# Patient Record
Sex: MALE | Race: WHITE | Employment: FULL TIME | URBAN - METROPOLITAN AREA
[De-identification: names, ages, dates, MRNs, and addresses within clinical notes are randomized per-mention and may not be internally consistent; named-entity substitution may affect disease eponyms.]

---

## 2019-07-10 ENCOUNTER — TELEPHONE (OUTPATIENT)
Dept: RHEUMATOLOGY | Facility: CLINIC | Age: 39
End: 2019-07-10

## 2019-07-10 NOTE — TELEPHONE ENCOUNTER
Called and offered pt appt on Friday, pt is unable to make it, will place him on waiting list to be seen.    Hannah Flores RN  Rheumatology Clinic

## 2019-07-10 NOTE — TELEPHONE ENCOUNTER
----- Message from Isabella Manley MD sent at 7/10/2019  8:38 AM CDT -----  Regarding: new ANCA vasculitis  I could see him on Fri at 3 pm, 30 min is ok.

## 2019-07-16 ENCOUNTER — TELEPHONE (OUTPATIENT)
Dept: OTOLARYNGOLOGY | Facility: CLINIC | Age: 39
End: 2019-07-16

## 2019-07-16 NOTE — TELEPHONE ENCOUNTER
LVM with patient letting him know that Dr. Singleton will not be in clinic on 8/21 but I do have him rescheduled to 8/19.  Left Call center number in case he does need to reschedule to next available.

## 2019-08-03 NOTE — TELEPHONE ENCOUNTER
FUTURE VISIT INFORMATION      FUTURE VISIT INFORMATION:    Date: 8/19/19    Time: 3PM    [place] [facility] [place]  REFERRAL INFORMATION:    Referring provider:  Dr Kyree Jordan    Referring providers clinic:  [facility]?    Reason for visit/diagnosis : persistent sinus disease and possible ANCA vasculitis not improving with methotrexate treatment    RECORDS REQUESTED FROM:       Clinic name Comments Records Status Imaging Status   [facility]   06/21/2019 COMPLETE PULMONARY FUNCTION TEST WITH BRONCHODILATOR with ROBIN Sarmiento MD Care Everywhere    [facility]   06/25/2019 notes with ROBIN Sarmiento MD Care Everywhere    [facility] images 5/9/19 CT Sinus and Ct Chest  Care Everywhere PACS

## 2019-08-07 NOTE — TELEPHONE ENCOUNTER
Called and offered an appt for tomorrow with Dr. Manley.  Pt accepted.    Hannah Flores RN  Rheumatology Clinic

## 2019-08-08 ENCOUNTER — OFFICE VISIT (OUTPATIENT)
Dept: RHEUMATOLOGY | Facility: CLINIC | Age: 39
End: 2019-08-08
Attending: INTERNAL MEDICINE
Payer: COMMERCIAL

## 2019-08-08 VITALS
BODY MASS INDEX: 29.28 KG/M2 | DIASTOLIC BLOOD PRESSURE: 81 MMHG | HEIGHT: 68 IN | RESPIRATION RATE: 95 BRPM | SYSTOLIC BLOOD PRESSURE: 128 MMHG | WEIGHT: 193.2 LBS | TEMPERATURE: 98 F | HEART RATE: 55 BPM

## 2019-08-08 DIAGNOSIS — I77.82 ANCA-ASSOCIATED VASCULITIS (H): ICD-10-CM

## 2019-08-08 DIAGNOSIS — I77.82 ANCA-ASSOCIATED VASCULITIS (H): Primary | ICD-10-CM

## 2019-08-08 LAB
MYELOPEROXIDASE AB SER-ACNC: >8 AI (ref 0–0.9)
PROTEINASE3 IGG SER-ACNC: <0.2 AI (ref 0–0.9)

## 2019-08-08 PROCEDURE — G0463 HOSPITAL OUTPT CLINIC VISIT: HCPCS | Mod: ZF

## 2019-08-08 PROCEDURE — 86255 FLUORESCENT ANTIBODY SCREEN: CPT | Performed by: INTERNAL MEDICINE

## 2019-08-08 PROCEDURE — 82657 ENZYME CELL ACTIVITY: CPT | Performed by: INTERNAL MEDICINE

## 2019-08-08 PROCEDURE — 83876 ASSAY MYELOPEROXIDASE: CPT | Performed by: INTERNAL MEDICINE

## 2019-08-08 PROCEDURE — 36415 COLL VENOUS BLD VENIPUNCTURE: CPT | Performed by: INTERNAL MEDICINE

## 2019-08-08 PROCEDURE — 83516 IMMUNOASSAY NONANTIBODY: CPT | Performed by: INTERNAL MEDICINE

## 2019-08-08 RX ORDER — METHOTREXATE 2.5 MG/1
2.5 TABLET ORAL WEEKLY
COMMUNITY
Start: 2019-06-06 | End: 2019-08-22

## 2019-08-08 RX ORDER — DEXAMETHASONE 4 MG/1
1 TABLET ORAL DAILY
COMMUNITY
Start: 2019-04-30

## 2019-08-08 RX ORDER — CLINDAMYCIN PHOSPHATE 10 MG/G
AEROSOL, FOAM TOPICAL PRN
COMMUNITY
Start: 2019-06-01 | End: 2024-05-15

## 2019-08-08 RX ORDER — MONTELUKAST SODIUM 10 MG/1
10 TABLET ORAL AT BEDTIME
COMMUNITY
Start: 2019-06-13

## 2019-08-08 RX ORDER — ALBUTEROL SULFATE 90 UG/1
AEROSOL, METERED RESPIRATORY (INHALATION) PRN
COMMUNITY
Start: 2019-05-14

## 2019-08-08 RX ORDER — FOLIC ACID 1 MG/1
3 TABLET ORAL DAILY
COMMUNITY
Start: 2019-07-05 | End: 2019-08-22

## 2019-08-08 RX ORDER — TRETINOIN 1 MG/G
CREAM TOPICAL PRN
COMMUNITY
Start: 2018-12-26 | End: 2024-05-15

## 2019-08-08 RX ORDER — BUDESONIDE 1 MG/2ML
1 INHALANT ORAL DAILY
COMMUNITY
Start: 2019-07-29 | End: 2024-05-15

## 2019-08-08 RX ORDER — CETIRIZINE HYDROCHLORIDE 10 MG/1
10 TABLET ORAL DAILY
COMMUNITY
Start: 2019-06-25 | End: 2024-05-15

## 2019-08-08 RX ORDER — BUDESONIDE AND FORMOTEROL FUMARATE DIHYDRATE 160; 4.5 UG/1; UG/1
2 AEROSOL RESPIRATORY (INHALATION) 2 TIMES DAILY
COMMUNITY
Start: 2019-07-08

## 2019-08-08 SDOH — HEALTH STABILITY: MENTAL HEALTH: HOW OFTEN DO YOU HAVE A DRINK CONTAINING ALCOHOL?: 2-3 TIMES A WEEK

## 2019-08-08 ASSESSMENT — PAIN SCALES - GENERAL: PAINLEVEL: NO PAIN (0)

## 2019-08-08 ASSESSMENT — MIFFLIN-ST. JEOR: SCORE: 1755.85

## 2019-08-08 NOTE — LETTER
Patient:  Mj Mason Jr.  :   1980  MRN:     [id number]        Mr.Eric Isabella Brown.  [address]  [facility] [address]        2019    Dear ,    We are writing to inform you of your test results. I spoke with Dr. 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 TPMT is normal and imuran could be used. As we discussed, recommend to switch methotrexate to imuran 50 mg a day with monitoring labs in 4 weeks. I will send a prescription and order labs. Please let me know if you have any questions.      Results for orders placed or performed in visit on 19   Thiopurine Methyltransferase RBC   Result Value Ref Range    Thiopurine Methyltransferase RBC 28.3 24.0 - 44.0 U/mL       Isabella Manley MD
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
## 2019-08-08 NOTE — LETTER
Patient:  Mj Mason Jr.  :   1980  MRN:     [id number]        Mr.Eric Isabella Brown.  [address]  [facility] [address]        2019    Dear ,    We are writing to inform you of your test results. I spoke with Dr. 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 TPMT is normal and imuran could be used. As we discussed, recommend to switch methotrexate to imuran 50 mg a day with monitoring labs in 4 weeks. I will send a prescription and order labs. Please let me know if you have any questions.    Positive MPO (vasculitis marker) which could be seen in EGPA.      Results for orders placed or performed in visit on 19   ANCA Vasculitis panel   Result Value Ref Range    Myeloperoxidase Antibody IgG >8.0 (H) 0.0 - 0.9 AI    Proteinase 3 Antibody IgG <0.2 0.0 - 0.9 AI   ANCA IgG by IFA with Reflex to Titer   Result Value Ref Range    Neutrophil Cytoplasmic Antibody <1:10 <1:10 [titer]    Neutrophil Cytoplasmic Antibody Pattern       The ANCA IFA is <1:10.  No further testing will be performed.       Isabella Manley MD
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
## 2019-08-08 NOTE — PATIENT INSTRUCTIONS
Agree with diagnosis: Limited p-ANCA vasculitis    Stay on methotrexate till (have it on 8/14) we get the lab results then the plan is to switch to imuran 50 mg a day    Labs today    Return in 3-4 months
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
## 2019-08-08 NOTE — NURSING NOTE
"Chief Complaint   Patient presents with     Consult     ANCA Vasculitis     /81 (BP Location: Right arm, Patient Position: Sitting, Cuff Size: Adult Regular)   Pulse 55   Temp 98  F (36.7  C) (Oral)   Resp (!) 95   Ht 1.727 m (5' 8\")   Wt 87.6 kg (193 lb 3.2 oz)   BMI 29.38 kg/m      Gabi Vega CMA    8/8/2019 8:14 AM      "

## 2019-08-09 LAB
ANCA AB PATTERN SER IF-IMP: NORMAL
C-ANCA TITR SER IF: NORMAL {TITER}

## 2019-08-13 LAB — TPMT BLD-CCNC: 28.3 U/ML (ref 24–44)

## 2019-08-19 ENCOUNTER — PRE VISIT (OUTPATIENT)
Dept: OTOLARYNGOLOGY | Facility: CLINIC | Age: 39
End: 2019-08-19

## 2019-08-19 ENCOUNTER — OFFICE VISIT (OUTPATIENT)
Dept: OTOLARYNGOLOGY | Facility: CLINIC | Age: 39
End: 2019-08-19
Payer: COMMERCIAL

## 2019-08-19 VITALS
DIASTOLIC BLOOD PRESSURE: 73 MMHG | SYSTOLIC BLOOD PRESSURE: 117 MMHG | RESPIRATION RATE: 16 BRPM | BODY MASS INDEX: 29.62 KG/M2 | HEART RATE: 62 BPM | WEIGHT: 194.8 LBS

## 2019-08-19 DIAGNOSIS — J32.4 CHRONIC PANSINUSITIS: Primary | ICD-10-CM

## 2019-08-19 ASSESSMENT — PAIN SCALES - GENERAL: PAINLEVEL: NO PAIN (0)
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
## 2019-08-19 NOTE — NURSING NOTE
Chief Complaint   Patient presents with     Consult     sinus disease possible ANCA     Maia Kiser LPN
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
## 2019-08-19 NOTE — PATIENT INSTRUCTIONS
Thank you for choosing  [facility]  Please follow up with Dr. Singleton as needed.  [phone] appointment scheduling option 1 and nurse advice option 3.

## 2019-08-22 ENCOUNTER — TELEPHONE (OUTPATIENT)
Dept: RHEUMATOLOGY | Facility: CLINIC | Age: 39
End: 2019-08-22

## 2019-08-22 DIAGNOSIS — M30.1 EOSINOPHILIC GRANULOMATOSIS WITH POLYANGIITIS (EGPA) (H): Primary | ICD-10-CM

## 2019-08-22 DIAGNOSIS — Z51.81 MEDICATION MONITORING ENCOUNTER: ICD-10-CM

## 2019-08-22 DIAGNOSIS — D72.18 EOSINOPHILIC GRANULOMATOSIS WITH POLYANGIITIS (EGPA) (H): Primary | ICD-10-CM

## 2019-08-22 RX ORDER — AZATHIOPRINE 50 MG/1
50 TABLET ORAL DAILY
Qty: 30 TABLET | Refills: 1 | Status: SHIPPED | OUTPATIENT
Start: 2019-08-22 | End: 2019-10-22

## 2019-08-22 NOTE — RESULT ENCOUNTER NOTE
I spoke with Dr. 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 TPMT is normal and imuran could be used. As we discussed, recommend to switch methotrexate to imuran 50 mg a day with monitoring labs in 4 weeks. I will send a prescription and order labs. Please let me know if you have any questions.

## 2019-08-22 NOTE — RESULT ENCOUNTER NOTE
I spoke with Dr. 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 TPMT is normal and imuran could be used. As we discussed, recommend to switch methotrexate to imuran 50 mg a day with monitoring labs in 4 weeks. I will send a prescription and order labs. Please let me know if you have any questions.  Positive MPO (vasculitis marker) which could be seen in EGPA.

## 2019-08-22 NOTE — TELEPHONE ENCOUNTER
Isabella Manley MD Beard, Hannah, RN   Caller: Unspecified (Today,  9:36 AM)             Last methotrexate dose was supposed to be 8/14 and to start imuran 50 every day today. I already messaged him with test results.      Called and spoke with pt, he took last dose of Methotrexate on 8/14.  He will start Imuran at 50 mg a day and will repeat labs in one month.  Pt had no further questions.    Hannah Flores RN  Rheumatology Clinic

## 2019-08-22 NOTE — TELEPHONE ENCOUNTER
[facility] Call Center    Phone Message    May a detailed message be left on voicemail: yes    Reason for Call: Other: Patient is wondering how to proceed with his new medication and his old medication. He is wondering if he should titrate off the methotrexate or not. He would appreciate a phone call at [phone] to clarify the above for him//Thanks.     Action Taken: Message routed to:  [facility] ([facility]): rheum

## 2019-10-04 ENCOUNTER — HEALTH MAINTENANCE LETTER (OUTPATIENT)
Age: 39
End: 2019-10-04

## 2019-10-17 DIAGNOSIS — D72.18 EOSINOPHILIC GRANULOMATOSIS WITH POLYANGIITIS (EGPA) (H): ICD-10-CM

## 2019-10-17 DIAGNOSIS — M30.1 EOSINOPHILIC GRANULOMATOSIS WITH POLYANGIITIS (EGPA) (H): ICD-10-CM

## 2019-10-18 RX ORDER — AZATHIOPRINE 50 MG/1
TABLET ORAL
Qty: 30 TABLET | Refills: 1 | OUTPATIENT
Start: 2019-10-18

## 2019-10-18 NOTE — TELEPHONE ENCOUNTER
azaTHIOprine (IMURAN) 50 MG tablet      Last Written Prescription Date:  8/22/19  Last Fill Quantity: 30,   # refills: 1  Last Office Visit: 8/8/19  Future Office visit:  12/11/19    CBC RESULTS: No results for input(s): WBC, RBC, HGB, HCT, MCV, MCH, MCHC, RDW, PLT in the last 72791 hours.    No results found for: CR]    Liver Function Studies - No results for input(s): PROTTOTAL, ALBUMIN, BILITOTAL, ALKPHOS, AST, ALT, BILIDIRECT in the last 65498 hours.    Routing refill request to provider for review/approval because:    Drug not on the [facility] [facility] or [facility] refill protocol or controlled substance    Lab appointment on 12/11/19

## 2019-10-22 DIAGNOSIS — Z51.81 MEDICATION MONITORING ENCOUNTER: Primary | ICD-10-CM

## 2019-10-22 DIAGNOSIS — M30.1 EOSINOPHILIC GRANULOMATOSIS WITH POLYANGIITIS (EGPA) (H): ICD-10-CM

## 2019-10-22 DIAGNOSIS — Z51.81 MEDICATION MONITORING ENCOUNTER: ICD-10-CM

## 2019-10-22 DIAGNOSIS — D72.18 EOSINOPHILIC GRANULOMATOSIS WITH POLYANGIITIS (EGPA) (H): ICD-10-CM

## 2019-10-22 LAB
ALBUMIN SERPL-MCNC: 4.1 G/DL (ref 3.4–5)
ALT SERPL W P-5'-P-CCNC: 62 U/L (ref 0–70)
AST SERPL W P-5'-P-CCNC: 45 U/L (ref 0–45)
BASOPHILS # BLD AUTO: 0.1 10E9/L (ref 0–0.2)
BASOPHILS NFR BLD AUTO: 0.9 %
CREAT SERPL-MCNC: 0.94 MG/DL (ref 0.66–1.25)
CRP SERPL-MCNC: <2.9 MG/L (ref 0–8)
DIFFERENTIAL METHOD BLD: ABNORMAL
EOSINOPHIL # BLD AUTO: 0.1 10E9/L (ref 0–0.7)
EOSINOPHIL NFR BLD AUTO: 1.9 %
ERYTHROCYTE [DISTWIDTH] IN BLOOD BY AUTOMATED COUNT: 12.5 % (ref 10–15)
ERYTHROCYTE [SEDIMENTATION RATE] IN BLOOD BY WESTERGREN METHOD: 6 MM/H (ref 0–15)
GFR SERPL CREATININE-BSD FRML MDRD: >90 ML/MIN/{1.73_M2}
HCT VFR BLD AUTO: 44.6 % (ref 40–53)
HGB BLD-MCNC: 15 G/DL (ref 13.3–17.7)
IMM GRANULOCYTES # BLD: 0 10E9/L (ref 0–0.4)
IMM GRANULOCYTES NFR BLD: 0.2 %
LYMPHOCYTES # BLD AUTO: 1.5 10E9/L (ref 0.8–5.3)
LYMPHOCYTES NFR BLD AUTO: 27.4 %
MCH RBC QN AUTO: 33.8 PG (ref 26.5–33)
MCHC RBC AUTO-ENTMCNC: 33.6 G/DL (ref 31.5–36.5)
MCV RBC AUTO: 101 FL (ref 78–100)
MONOCYTES # BLD AUTO: 0.5 10E9/L (ref 0–1.3)
MONOCYTES NFR BLD AUTO: 8.8 %
NEUTROPHILS # BLD AUTO: 3.2 10E9/L (ref 1.6–8.3)
NEUTROPHILS NFR BLD AUTO: 60.8 %
NRBC # BLD AUTO: 0 10*3/UL
NRBC BLD AUTO-RTO: 0 /100
PLATELET # BLD AUTO: 244 10E9/L (ref 150–450)
RBC # BLD AUTO: 4.44 10E12/L (ref 4.4–5.9)
WBC # BLD AUTO: 5.3 10E9/L (ref 4–11)

## 2019-10-22 RX ORDER — AZATHIOPRINE 50 MG/1
50 TABLET ORAL 2 TIMES DAILY WITH MEALS
Qty: 60 TABLET | Refills: 1 | Status: SHIPPED | OUTPATIENT
Start: 2019-10-22 | End: 2020-02-06

## 2019-10-22 NOTE — LETTER
Patient:  Mj Mason Jr.  :   1980  MRN:     [id number]        [address]  [address] [address]        2019    Dear ,    We are writing to inform you of your test results.     Improved MPO vasculitis marker.    No imuran toxicity which is good news, refilled imuran as 50 mg twice a day with food, please have labs repeated in 4 weeks (ordered).      Results for orders placed or performed in visit on 10/22/19   ALT   Result Value Ref Range    ALT 62 0 - 70 U/L   Albumin level   Result Value Ref Range    Albumin 4.1 3.4 - 5.0 g/dL   AST   Result Value Ref Range    AST 45 0 - 45 U/L   CBC with platelets differential   Result Value Ref Range    WBC 5.3 4.0 - 11.0 10e9/L    RBC Count 4.44 4.4 - 5.9 10e12/L    Hemoglobin 15.0 13.3 - 17.7 g/dL    Hematocrit 44.6 40.0 - 53.0 %     (H) 78 - 100 fl    MCH 33.8 (H) 26.5 - 33.0 pg    MCHC 33.6 31.5 - 36.5 g/dL    RDW 12.5 10.0 - 15.0 %    Platelet Count 244 150 - 450 10e9/L    Diff Method Automated Method     % Neutrophils 60.8 %    % Lymphocytes 27.4 %    % Monocytes 8.8 %    % Eosinophils 1.9 %    % Basophils 0.9 %    % Immature Granulocytes 0.2 %    Nucleated RBCs 0 0 /100    Absolute Neutrophil 3.2 1.6 - 8.3 10e9/L    Absolute Lymphocytes 1.5 0.8 - 5.3 10e9/L    Absolute Monocytes 0.5 0.0 - 1.3 10e9/L    Absolute Eosinophils 0.1 0.0 - 0.7 10e9/L    Absolute Basophils 0.1 0.0 - 0.2 10e9/L    Abs Immature Granulocytes 0.0 0 - 0.4 10e9/L    Absolute Nucleated RBC 0.0    Creatinine   Result Value Ref Range    Creatinine 0.94 0.66 - 1.25 mg/dL    GFR Estimate >90 >60 mL/min/[1.73_m2]    GFR Estimate If Black >90 >60 mL/min/[1.73_m2]   ANCA Vasculitis panel   Result Value Ref Range    Myeloperoxidase Antibody IgG 6.9 (H) 0.0 - 0.9 AI    Proteinase 3 Antibody IgG <0.2 0.0 - 0.9 AI   Erythrocyte sedimentation rate auto   Result Value Ref Range    Sed Rate 6 0 - 15 mm/h   CRP inflammation   Result Value Ref Range    CRP  Inflammation <2.9 0.0 - 8.0 mg/L   ANCA IgG by IFA with Reflex to Titer   Result Value Ref Range    Neutrophil Cytoplasmic Antibody <1:10 <1:10 [titer]    Neutrophil Cytoplasmic Antibody Pattern       The ANCA IFA is <1:10.  No further testing will be performed.       Isabella Manley MD

## 2019-10-22 NOTE — TELEPHONE ENCOUNTER
Called and spoke with pt.  Relayed message from .  He will be more diligent in getting labs done in 4 weeks.  Aware that they have been ordered, he will just walk into lab to get done.  Will increase medication.    Hannah Flores RN  Rheumatology Clinic

## 2019-10-22 NOTE — TELEPHONE ENCOUNTER
Called and spoke with pt, he doesn't know if it is truly helping.  He does not feel that it is really helpful at this time.  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  His ENT did give him a prednisone taper, which has not really helped, he is currently losing his sense of smell.  He would like to have surgery this year, as his deductible is met.    He did state that his ENT does think there has been some improvement overall with the amount and size of polyps in the last year.  Pt is wondering if his dose of Imuran were to increase if he would notice a difference?    Discussed needing labs, pt misunderstood and thought it was 4 months after starting Imuran, not 4 weeks.  He will come in and get labs done today, and let me know that he ran out of medication yesterday, he did not take any today.    Hannah Flores RN  Rheumatology Clinic

## 2019-10-22 NOTE — RESULT ENCOUNTER NOTE
No imuran toxicity which is good news, refilled imuran as 50 mg twice a day with food, please have labs repeated in 4 weeks (ordered).

## 2019-10-22 NOTE — TELEPHONE ENCOUNTER
Recommend to increase AZA to 50 mg bid if today's labs look good with repeat monitoring labs in 4 weeks.

## 2019-10-23 LAB
ANCA AB PATTERN SER IF-IMP: NORMAL
C-ANCA TITR SER IF: NORMAL {TITER}

## 2019-10-24 LAB
MYELOPEROXIDASE AB SER-ACNC: 6.9 AI (ref 0–0.9)
PROTEINASE3 IGG SER-ACNC: <0.2 AI (ref 0–0.9)

## 2019-11-18 ENCOUNTER — TELEPHONE (OUTPATIENT)
Dept: RHEUMATOLOGY | Facility: CLINIC | Age: 39
End: 2019-11-18

## 2019-11-18 ENCOUNTER — TRANSFERRED RECORDS (OUTPATIENT)
Dept: HEALTH INFORMATION MANAGEMENT | Facility: CLINIC | Age: 39
End: 2019-11-18

## 2019-11-18 NOTE — TELEPHONE ENCOUNTER
Called and spoke with pt regarding Sinus Surgery on 12/10, he just got scheduled so he would like to rebook his appt for the 11th.  Appt is still open so have rebooked pt.      Pt currently does have a sinus infection.  He will hold Imuran until antibiotics are completed.  Discussed needing to hold Imuran for 3 prior to and post surgery on 12/10, he also is aware that there needs to be no signs or symptoms of infection and he should also discuss with his surgeon.      Hannah Flores RN  Rheumatology Clinic

## 2019-11-21 DIAGNOSIS — Z51.81 MEDICATION MONITORING ENCOUNTER: ICD-10-CM

## 2019-11-21 DIAGNOSIS — M30.1 EOSINOPHILIC GRANULOMATOSIS WITH POLYANGIITIS (EGPA) (H): ICD-10-CM

## 2019-11-21 DIAGNOSIS — D72.18 EOSINOPHILIC GRANULOMATOSIS WITH POLYANGIITIS (EGPA) (H): ICD-10-CM

## 2019-11-21 LAB
ALBUMIN SERPL-MCNC: 4.5 G/DL (ref 3.4–5)
ALT SERPL W P-5'-P-CCNC: 48 U/L (ref 0–70)
AST SERPL W P-5'-P-CCNC: 32 U/L (ref 0–45)
BASOPHILS # BLD AUTO: 0.1 10E9/L (ref 0–0.2)
BASOPHILS NFR BLD AUTO: 1.2 %
CREAT SERPL-MCNC: 0.94 MG/DL (ref 0.66–1.25)
DIFFERENTIAL METHOD BLD: ABNORMAL
EOSINOPHIL # BLD AUTO: 0.2 10E9/L (ref 0–0.7)
EOSINOPHIL NFR BLD AUTO: 3.8 %
ERYTHROCYTE [DISTWIDTH] IN BLOOD BY AUTOMATED COUNT: 12.2 % (ref 10–15)
GFR SERPL CREATININE-BSD FRML MDRD: >90 ML/MIN/{1.73_M2}
HCT VFR BLD AUTO: 44.8 % (ref 40–53)
HGB BLD-MCNC: 15.2 G/DL (ref 13.3–17.7)
IMM GRANULOCYTES # BLD: 0 10E9/L (ref 0–0.4)
IMM GRANULOCYTES NFR BLD: 0.4 %
LYMPHOCYTES # BLD AUTO: 1.7 10E9/L (ref 0.8–5.3)
LYMPHOCYTES NFR BLD AUTO: 34.2 %
MCH RBC QN AUTO: 33.6 PG (ref 26.5–33)
MCHC RBC AUTO-ENTMCNC: 33.9 G/DL (ref 31.5–36.5)
MCV RBC AUTO: 99 FL (ref 78–100)
MONOCYTES # BLD AUTO: 0.5 10E9/L (ref 0–1.3)
MONOCYTES NFR BLD AUTO: 9.5 %
NEUTROPHILS # BLD AUTO: 2.5 10E9/L (ref 1.6–8.3)
NEUTROPHILS NFR BLD AUTO: 50.9 %
NRBC # BLD AUTO: 0 10*3/UL
NRBC BLD AUTO-RTO: 0 /100
PLATELET # BLD AUTO: 260 10E9/L (ref 150–450)
RBC # BLD AUTO: 4.52 10E12/L (ref 4.4–5.9)
WBC # BLD AUTO: 5 10E9/L (ref 4–11)

## 2019-11-21 NOTE — LETTER
Patient:  Mj Mason Jr.  :   1980  MRN:     [id number]        Mr.Eric Isabella Art   [address] [facility] [address]        2019    Dear ,    We are writing to inform you of your test results. Stable labs, no imuran toxicity.      Results for orders placed or performed in visit on 19   Creatinine     Status: None   Result Value Ref Range    Creatinine 0.94 0.66 - 1.25 mg/dL    GFR Estimate >90 >60 mL/min/[1.73_m2]    GFR Estimate If Black >90 >60 mL/min/[1.73_m2]   CBC with platelets differential     Status: Abnormal   Result Value Ref Range    WBC 5.0 4.0 - 11.0 10e9/L    RBC Count 4.52 4.4 - 5.9 10e12/L    Hemoglobin 15.2 13.3 - 17.7 g/dL    Hematocrit 44.8 40.0 - 53.0 %    MCV 99 78 - 100 fl    MCH 33.6 (H) 26.5 - 33.0 pg    MCHC 33.9 31.5 - 36.5 g/dL    RDW 12.2 10.0 - 15.0 %    Platelet Count 260 150 - 450 10e9/L    Diff Method Automated Method     % Neutrophils 50.9 %    % Lymphocytes 34.2 %    % Monocytes 9.5 %    % Eosinophils 3.8 %    % Basophils 1.2 %    % Immature Granulocytes 0.4 %    Nucleated RBCs 0 0 /100    Absolute Neutrophil 2.5 1.6 - 8.3 10e9/L    Absolute Lymphocytes 1.7 0.8 - 5.3 10e9/L    Absolute Monocytes 0.5 0.0 - 1.3 10e9/L    Absolute Eosinophils 0.2 0.0 - 0.7 10e9/L    Absolute Basophils 0.1 0.0 - 0.2 10e9/L    Abs Immature Granulocytes 0.0 0 - 0.4 10e9/L    Absolute Nucleated RBC 0.0    AST     Status: None   Result Value Ref Range    AST 32 0 - 45 U/L   Albumin level     Status: None   Result Value Ref Range    Albumin 4.5 3.4 - 5.0 g/dL   ALT     Status: None   Result Value Ref Range    ALT 48 0 - 70 U/L       Isabella Manley MD

## 2020-02-06 ENCOUNTER — OFFICE VISIT (OUTPATIENT)
Dept: RHEUMATOLOGY | Facility: CLINIC | Age: 40
End: 2020-02-06
Attending: INTERNAL MEDICINE
Payer: COMMERCIAL

## 2020-02-06 VITALS
BODY MASS INDEX: 29.86 KG/M2 | OXYGEN SATURATION: 98 % | WEIGHT: 196.4 LBS | HEART RATE: 66 BPM | SYSTOLIC BLOOD PRESSURE: 134 MMHG | DIASTOLIC BLOOD PRESSURE: 80 MMHG

## 2020-02-06 DIAGNOSIS — D72.18 EOSINOPHILIC GRANULOMATOSIS WITH POLYANGIITIS (EGPA) (H): ICD-10-CM

## 2020-02-06 DIAGNOSIS — M30.1 EOSINOPHILIC GRANULOMATOSIS WITH POLYANGIITIS (EGPA) (H): ICD-10-CM

## 2020-02-06 LAB
ALBUMIN SERPL-MCNC: 3.8 G/DL (ref 3.4–5)
ALBUMIN UR-MCNC: NEGATIVE MG/DL
ALT SERPL W P-5'-P-CCNC: 92 U/L (ref 0–70)
APPEARANCE UR: CLEAR
AST SERPL W P-5'-P-CCNC: 47 U/L (ref 0–45)
BASOPHILS # BLD AUTO: 0 10E9/L (ref 0–0.2)
BASOPHILS NFR BLD AUTO: 0.8 %
BILIRUB UR QL STRIP: NEGATIVE
COLOR UR AUTO: YELLOW
CREAT SERPL-MCNC: 0.96 MG/DL (ref 0.66–1.25)
CREAT UR-MCNC: 45 MG/DL
CRP SERPL-MCNC: <2.9 MG/L (ref 0–8)
DIFFERENTIAL METHOD BLD: ABNORMAL
EOSINOPHIL # BLD AUTO: 0.2 10E9/L (ref 0–0.7)
EOSINOPHIL NFR BLD AUTO: 4.3 %
ERYTHROCYTE [DISTWIDTH] IN BLOOD BY AUTOMATED COUNT: 12.9 % (ref 10–15)
ERYTHROCYTE [SEDIMENTATION RATE] IN BLOOD BY WESTERGREN METHOD: 5 MM/H (ref 0–15)
GFR SERPL CREATININE-BSD FRML MDRD: >90 ML/MIN/{1.73_M2}
GLUCOSE UR STRIP-MCNC: NEGATIVE MG/DL
HBV CORE AB SERPL QL IA: NONREACTIVE
HBV SURFACE AG SERPL QL IA: NONREACTIVE
HCT VFR BLD AUTO: 43.8 % (ref 40–53)
HCV AB SERPL QL IA: NONREACTIVE
HGB BLD-MCNC: 14.7 G/DL (ref 13.3–17.7)
HGB UR QL STRIP: NEGATIVE
IMM GRANULOCYTES # BLD: 0 10E9/L (ref 0–0.4)
IMM GRANULOCYTES NFR BLD: 0.6 %
KETONES UR STRIP-MCNC: NEGATIVE MG/DL
LEUKOCYTE ESTERASE UR QL STRIP: NEGATIVE
LYMPHOCYTES # BLD AUTO: 1.5 10E9/L (ref 0.8–5.3)
LYMPHOCYTES NFR BLD AUTO: 28.6 %
MCH RBC QN AUTO: 33.9 PG (ref 26.5–33)
MCHC RBC AUTO-ENTMCNC: 33.6 G/DL (ref 31.5–36.5)
MCV RBC AUTO: 101 FL (ref 78–100)
MONOCYTES # BLD AUTO: 0.5 10E9/L (ref 0–1.3)
MONOCYTES NFR BLD AUTO: 10.4 %
MUCOUS THREADS #/AREA URNS LPF: PRESENT /LPF
MYELOPEROXIDASE AB SER-ACNC: 6.3 AI (ref 0–0.9)
NEUTROPHILS # BLD AUTO: 2.9 10E9/L (ref 1.6–8.3)
NEUTROPHILS NFR BLD AUTO: 55.3 %
NITRATE UR QL: NEGATIVE
NRBC # BLD AUTO: 0 10*3/UL
NRBC BLD AUTO-RTO: 0 /100
PH UR STRIP: 6 PH (ref 5–7)
PLATELET # BLD AUTO: 208 10E9/L (ref 150–450)
PROT UR-MCNC: <0.05 G/L
PROT/CREAT 24H UR: NORMAL G/G CR (ref 0–0.2)
PROTEINASE3 IGG SER-ACNC: <0.2 AI (ref 0–0.9)
RBC # BLD AUTO: 4.34 10E12/L (ref 4.4–5.9)
RBC #/AREA URNS AUTO: 1 /HPF (ref 0–2)
SOURCE: ABNORMAL
SP GR UR STRIP: 1.01 (ref 1–1.03)
UROBILINOGEN UR STRIP-MCNC: 0 MG/DL (ref 0–2)
WBC # BLD AUTO: 5.2 10E9/L (ref 4–11)
WBC #/AREA URNS AUTO: 1 /HPF (ref 0–5)

## 2020-02-06 PROCEDURE — G0463 HOSPITAL OUTPT CLINIC VISIT: HCPCS | Mod: ZF

## 2020-02-06 PROCEDURE — 84156 ASSAY OF PROTEIN URINE: CPT | Performed by: INTERNAL MEDICINE

## 2020-02-06 PROCEDURE — 82040 ASSAY OF SERUM ALBUMIN: CPT | Performed by: INTERNAL MEDICINE

## 2020-02-06 PROCEDURE — 81001 URINALYSIS AUTO W/SCOPE: CPT | Performed by: INTERNAL MEDICINE

## 2020-02-06 PROCEDURE — 83876 ASSAY MYELOPEROXIDASE: CPT | Performed by: INTERNAL MEDICINE

## 2020-02-06 PROCEDURE — 84460 ALANINE AMINO (ALT) (SGPT): CPT | Performed by: INTERNAL MEDICINE

## 2020-02-06 PROCEDURE — 85025 COMPLETE CBC W/AUTO DIFF WBC: CPT | Performed by: INTERNAL MEDICINE

## 2020-02-06 PROCEDURE — 85652 RBC SED RATE AUTOMATED: CPT | Performed by: INTERNAL MEDICINE

## 2020-02-06 PROCEDURE — 86481 TB AG RESPONSE T-CELL SUSP: CPT | Performed by: INTERNAL MEDICINE

## 2020-02-06 PROCEDURE — 86803 HEPATITIS C AB TEST: CPT | Performed by: INTERNAL MEDICINE

## 2020-02-06 PROCEDURE — 86140 C-REACTIVE PROTEIN: CPT | Performed by: INTERNAL MEDICINE

## 2020-02-06 PROCEDURE — 87340 HEPATITIS B SURFACE AG IA: CPT | Performed by: INTERNAL MEDICINE

## 2020-02-06 PROCEDURE — 83516 IMMUNOASSAY NONANTIBODY: CPT | Performed by: INTERNAL MEDICINE

## 2020-02-06 PROCEDURE — 82565 ASSAY OF CREATININE: CPT | Performed by: INTERNAL MEDICINE

## 2020-02-06 PROCEDURE — 86704 HEP B CORE ANTIBODY TOTAL: CPT | Performed by: INTERNAL MEDICINE

## 2020-02-06 PROCEDURE — 86255 FLUORESCENT ANTIBODY SCREEN: CPT | Performed by: INTERNAL MEDICINE

## 2020-02-06 PROCEDURE — 36415 COLL VENOUS BLD VENIPUNCTURE: CPT | Performed by: INTERNAL MEDICINE

## 2020-02-06 PROCEDURE — 84450 TRANSFERASE (AST) (SGOT): CPT | Performed by: INTERNAL MEDICINE

## 2020-02-06 RX ORDER — AZATHIOPRINE 50 MG/1
50 TABLET ORAL 2 TIMES DAILY WITH MEALS
Qty: 60 TABLET | Refills: 1 | Status: SHIPPED | OUTPATIENT
Start: 2020-02-06 | End: 2020-04-30

## 2020-02-06 ASSESSMENT — PAIN SCALES - GENERAL: PAINLEVEL: NO PAIN (0)

## 2020-02-06 NOTE — NURSING NOTE
Chief Complaint   Patient presents with     RECHECK     ANCA Vasculitis     Blood pressure 134/80, pulse 66, weight 89.1 kg (196 lb 6.4 oz), SpO2 98 %.    Pete Dinero/VARSHA  February 6, 2020 10:07 AM

## 2020-02-06 NOTE — PATIENT INSTRUCTIONS
Resume azathioprine (imuran) at 50 mg twice a day    Labs today and again in a month    Consider Nucala if imuran does not work    Keep 4/2020 appointment and again in July 2020

## 2020-02-06 NOTE — LETTER
Patient:  Mj Mason Jr.  :   1980  MRN:     [id number]        Mr.Eric Isabella Art   [address]  [facility] [address]        2020    Dear ,    We are writing to inform you of your test results. Improved MPO vasculitis marker but liver test ALT is up (could be due to antibiotic as you were not on imuran when you had labs done), recommend repeat AST/ALT this week. Also recommend to avoid alcohol, NSIADs like ibuprofen.      Results for orders placed or performed in visit on 20   Hepatitis C antibody     Status: None   Result Value Ref Range    Hepatitis C Antibody Nonreactive NR^Nonreactive   Hepatitis B surface antigen     Status: None   Result Value Ref Range    Hep B Surface Agn Nonreactive NR^Nonreactive   Hepatitis B core antibody     Status: None   Result Value Ref Range    Hepatitis B Core Zenaida Nonreactive NR^Nonreactive   ANCA IgG by IFA with Reflex to Titer     Status: None   Result Value Ref Range    Neutrophil Cytoplasmic Antibody <1:10 <1:10 [titer]    Neutrophil Cytoplasmic Antibody Pattern       The ANCA IFA is <1:10.  No further testing will be performed.   Erythrocyte sedimentation rate auto     Status: None   Result Value Ref Range    Sed Rate 5 0 - 15 mm/h   Protein  random urine with Creat Ratio     Status: None   Result Value Ref Range    Protein Random Urine <0.05 g/L    Protein Total Urine g/gr Creatinine Unable to calculate due to low value 0 - 0.2 g/g Cr   UA with Microscopic reflex to Culture     Status: Abnormal   Result Value Ref Range    Color Urine Yellow     Appearance Urine Clear     Glucose Urine Negative NEG^Negative mg/dL    Bilirubin Urine Negative NEG^Negative    Ketones Urine Negative NEG^Negative mg/dL    Specific Gravity Urine 1.011 1.003 - 1.035    Blood Urine Negative NEG^Negative    pH Urine 6.0 5.0 - 7.0 pH    Protein Albumin Urine Negative NEG^Negative mg/dL    Urobilinogen mg/dL 0.0 0.0 - 2.0 mg/dL    Nitrite Urine Negative  NEG^Negative    Leukocyte Esterase Urine Negative NEG^Negative    Source Midstream Urine     WBC Urine 1 0 - 5 /HPF    RBC Urine 1 0 - 2 /HPF    Mucous Urine Present (A) NEG^Negative /LPF   CRP inflammation     Status: None   Result Value Ref Range    CRP Inflammation <2.9 0.0 - 8.0 mg/L   Creatinine     Status: None   Result Value Ref Range    Creatinine 0.96 0.66 - 1.25 mg/dL    GFR Estimate >90 >60 mL/min/[1.73_m2]    GFR Estimate If Black >90 >60 mL/min/[1.73_m2]   Albumin level     Status: None   Result Value Ref Range    Albumin 3.8 3.4 - 5.0 g/dL   CBC with platelets differential     Status: Abnormal   Result Value Ref Range    WBC 5.2 4.0 - 11.0 10e9/L    RBC Count 4.34 (L) 4.4 - 5.9 10e12/L    Hemoglobin 14.7 13.3 - 17.7 g/dL    Hematocrit 43.8 40.0 - 53.0 %     (H) 78 - 100 fl    MCH 33.9 (H) 26.5 - 33.0 pg    MCHC 33.6 31.5 - 36.5 g/dL    RDW 12.9 10.0 - 15.0 %    Platelet Count 208 150 - 450 10e9/L    Diff Method Automated Method     % Neutrophils 55.3 %    % Lymphocytes 28.6 %    % Monocytes 10.4 %    % Eosinophils 4.3 %    % Basophils 0.8 %    % Immature Granulocytes 0.6 %    Nucleated RBCs 0 0 /100    Absolute Neutrophil 2.9 1.6 - 8.3 10e9/L    Absolute Lymphocytes 1.5 0.8 - 5.3 10e9/L    Absolute Monocytes 0.5 0.0 - 1.3 10e9/L    Absolute Eosinophils 0.2 0.0 - 0.7 10e9/L    Absolute Basophils 0.0 0.0 - 0.2 10e9/L    Abs Immature Granulocytes 0.0 0 - 0.4 10e9/L    Absolute Nucleated RBC 0.0    Vasculitis panel     Status: Abnormal   Result Value Ref Range    Myeloperoxidase Antibody IgG 6.3 (H) 0.0 - 0.9 AI    Proteinase 3 Antibody IgG <0.2 0.0 - 0.9 AI   ALT     Status: Abnormal   Result Value Ref Range    ALT 92 (H) 0 - 70 U/L   AST     Status: Abnormal   Result Value Ref Range    AST 47 (H) 0 - 45 U/L   Creatinine urine calculation only     Status: None   Result Value Ref Range    Creatinine Urine 45 mg/dL   Quantiferon TB Gold Plus     Status: None   Result Value Ref Range    Quantiferon-TB  Gold Plus Result Negative NEG^Negative    TB1 Ag minus Nil Value 0.00 IU/mL    TB2 Ag minus Nil Value 0.00 IU/mL    Mitogen minus Nil Result >10.00 IU/mL    Nil Result 0.04 IU/mL       Isabella Manley MD
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
## 2020-02-07 LAB
ANCA AB PATTERN SER IF-IMP: NORMAL
C-ANCA TITR SER IF: NORMAL {TITER}
GAMMA INTERFERON BACKGROUND BLD IA-ACNC: 0.04 IU/ML
M TB IFN-G BLD-IMP: NEGATIVE
M TB IFN-G CD4+ BCKGRND COR BLD-ACNC: >10 IU/ML
MITOGEN IGNF BCKGRD COR BLD-ACNC: 0 IU/ML
MITOGEN IGNF BCKGRD COR BLD-ACNC: 0 IU/ML

## 2020-02-12 NOTE — RESULT ENCOUNTER NOTE
Improved MPO vasculitis marker but liver test ALT is up (could be due to antibiotic as you were not on imuran when you had labs done), recommend repeat AST/ALT this week. Also recommend to avoid alcohol, NSIADs like ibuprofen.

## 2020-03-24 DIAGNOSIS — M30.1 EOSINOPHILIC GRANULOMATOSIS WITH POLYANGIITIS (EGPA) (H): Primary | ICD-10-CM

## 2020-03-24 DIAGNOSIS — D72.18 EOSINOPHILIC GRANULOMATOSIS WITH POLYANGIITIS (EGPA) (H): Primary | ICD-10-CM

## 2020-03-25 DIAGNOSIS — M30.1 EOSINOPHILIC GRANULOMATOSIS WITH POLYANGIITIS (EGPA) (H): ICD-10-CM

## 2020-03-25 DIAGNOSIS — D72.18 EOSINOPHILIC GRANULOMATOSIS WITH POLYANGIITIS (EGPA) (H): ICD-10-CM

## 2020-03-25 LAB
ALBUMIN SERPL-MCNC: 4.4 G/DL (ref 3.4–5)
ALBUMIN UR-MCNC: NEGATIVE MG/DL
ALP SERPL-CCNC: 52 U/L (ref 40–150)
ALT SERPL W P-5'-P-CCNC: 45 U/L (ref 0–70)
ANION GAP SERPL CALCULATED.3IONS-SCNC: 2 MMOL/L (ref 3–14)
APPEARANCE UR: CLEAR
AST SERPL W P-5'-P-CCNC: 28 U/L (ref 0–45)
BASOPHILS # BLD AUTO: 0.1 10E9/L (ref 0–0.2)
BASOPHILS NFR BLD AUTO: 1.6 %
BILIRUB SERPL-MCNC: 0.6 MG/DL (ref 0.2–1.3)
BILIRUB UR QL STRIP: NEGATIVE
BUN SERPL-MCNC: 22 MG/DL (ref 7–30)
CALCIUM SERPL-MCNC: 9.1 MG/DL (ref 8.5–10.1)
CHLORIDE SERPL-SCNC: 106 MMOL/L (ref 94–109)
CO2 SERPL-SCNC: 32 MMOL/L (ref 20–32)
COLOR UR AUTO: YELLOW
CREAT SERPL-MCNC: 0.98 MG/DL (ref 0.66–1.25)
CREAT UR-MCNC: 24 MG/DL
CRP SERPL-MCNC: <2.9 MG/L (ref 0–8)
DIFFERENTIAL METHOD BLD: ABNORMAL
EOSINOPHIL # BLD AUTO: 0.2 10E9/L (ref 0–0.7)
EOSINOPHIL NFR BLD AUTO: 4.8 %
ERYTHROCYTE [DISTWIDTH] IN BLOOD BY AUTOMATED COUNT: 13 % (ref 10–15)
ERYTHROCYTE [SEDIMENTATION RATE] IN BLOOD BY WESTERGREN METHOD: 5 MM/H (ref 0–15)
GFR SERPL CREATININE-BSD FRML MDRD: >90 ML/MIN/{1.73_M2}
GLUCOSE SERPL-MCNC: 53 MG/DL (ref 70–99)
GLUCOSE UR STRIP-MCNC: NEGATIVE MG/DL
HCT VFR BLD AUTO: 45.3 % (ref 40–53)
HGB BLD-MCNC: 15.1 G/DL (ref 13.3–17.7)
HGB UR QL STRIP: NEGATIVE
IMM GRANULOCYTES # BLD: 0 10E9/L (ref 0–0.4)
IMM GRANULOCYTES NFR BLD: 0.3 %
KETONES UR STRIP-MCNC: NEGATIVE MG/DL
LEUKOCYTE ESTERASE UR QL STRIP: NEGATIVE
LYMPHOCYTES # BLD AUTO: 1.5 10E9/L (ref 0.8–5.3)
LYMPHOCYTES NFR BLD AUTO: 41 %
MCH RBC QN AUTO: 33.3 PG (ref 26.5–33)
MCHC RBC AUTO-ENTMCNC: 33.3 G/DL (ref 31.5–36.5)
MCV RBC AUTO: 100 FL (ref 78–100)
MONOCYTES # BLD AUTO: 0.3 10E9/L (ref 0–1.3)
MONOCYTES NFR BLD AUTO: 9 %
MYELOPEROXIDASE AB SER-ACNC: 4.7 AI (ref 0–0.9)
NEUTROPHILS # BLD AUTO: 1.6 10E9/L (ref 1.6–8.3)
NEUTROPHILS NFR BLD AUTO: 43.3 %
NITRATE UR QL: NEGATIVE
NRBC # BLD AUTO: 0 10*3/UL
NRBC BLD AUTO-RTO: 0 /100
PH UR STRIP: 7 PH (ref 5–7)
PLATELET # BLD AUTO: 235 10E9/L (ref 150–450)
POTASSIUM SERPL-SCNC: 3.6 MMOL/L (ref 3.4–5.3)
PROT SERPL-MCNC: 7.5 G/DL (ref 6.8–8.8)
PROT UR-MCNC: <0.05 G/L
PROT/CREAT 24H UR: NORMAL G/G CR (ref 0–0.2)
PROTEINASE3 IGG SER-ACNC: <0.2 AI (ref 0–0.9)
RBC # BLD AUTO: 4.53 10E12/L (ref 4.4–5.9)
RBC #/AREA URNS AUTO: 0 /HPF (ref 0–2)
SODIUM SERPL-SCNC: 140 MMOL/L (ref 133–144)
SOURCE: NORMAL
SP GR UR STRIP: 1.01 (ref 1–1.03)
SQUAMOUS #/AREA URNS AUTO: <1 /HPF (ref 0–1)
UROBILINOGEN UR STRIP-MCNC: 0 MG/DL (ref 0–2)
WBC # BLD AUTO: 3.8 10E9/L (ref 4–11)
WBC #/AREA URNS AUTO: 0 /HPF (ref 0–5)

## 2020-03-25 NOTE — LETTER
April 4, 2020      Mj Mason Jr.  [address]  [place] [address]        Dear ,    We are writing to inform you of your test results.    Improved liver tests and MPO (vasculitis marker). If resuming imuran does not help, will try mepolizumab (FDA approved for EGPA vasculitis which causes nasal polyps)    Resulted Orders   IgE   Result Value Ref Range    IGE 22 0 - 114 KIU/L   CRP inflammation   Result Value Ref Range    CRP Inflammation <2.9 0.0 - 8.0 mg/L   ESR   Result Value Ref Range    Sed Rate 5 0 - 15 mm/h   Comprehensive metabolic panel   Result Value Ref Range    Sodium 140 133 - 144 mmol/L    Potassium 3.6 3.4 - 5.3 mmol/L    Chloride 106 94 - 109 mmol/L    Carbon Dioxide 32 20 - 32 mmol/L    Anion Gap 2 (L) 3 - 14 mmol/L    Glucose 53 (L) 70 - 99 mg/dL    Urea Nitrogen 22 7 - 30 mg/dL    Creatinine 0.98 0.66 - 1.25 mg/dL    GFR Estimate >90 >60 mL/min/[1.73_m2]      Comment:      Non  GFR Calc  Starting 12/18/2018, serum creatinine based estimated GFR (eGFR) will be   calculated using the Chronic Kidney Disease Epidemiology Collaboration   (CKD-EPI) equation.      GFR Estimate If Black >90 >60 mL/min/[1.73_m2]      Comment:       GFR Calc  Starting 12/18/2018, serum creatinine based estimated GFR (eGFR) will be   calculated using the Chronic Kidney Disease Epidemiology Collaboration   (CKD-EPI) equation.      Calcium 9.1 8.5 - 10.1 mg/dL    Bilirubin Total 0.6 0.2 - 1.3 mg/dL    Albumin 4.4 3.4 - 5.0 g/dL    Protein Total 7.5 6.8 - 8.8 g/dL    Alkaline Phosphatase 52 40 - 150 U/L    ALT 45 0 - 70 U/L    AST 28 0 - 45 U/L   CBC with platelets differential   Result Value Ref Range    WBC 3.8 (L) 4.0 - 11.0 10e9/L    RBC Count 4.53 4.4 - 5.9 10e12/L    Hemoglobin 15.1 13.3 - 17.7 g/dL    Hematocrit 45.3 40.0 - 53.0 %     78 - 100 fl    MCH 33.3 (H) 26.5 - 33.0 pg    MCHC 33.3 31.5 - 36.5 g/dL    RDW 13.0 10.0 - 15.0 %    Platelet Count 235 150 - 450  10e9/L    Diff Method Automated Method     % Neutrophils 43.3 %    % Lymphocytes 41.0 %    % Monocytes 9.0 %    % Eosinophils 4.8 %    % Basophils 1.6 %    % Immature Granulocytes 0.3 %    Nucleated RBCs 0 0 /100    Absolute Neutrophil 1.6 1.6 - 8.3 10e9/L    Absolute Lymphocytes 1.5 0.8 - 5.3 10e9/L    Absolute Monocytes 0.3 0.0 - 1.3 10e9/L    Absolute Eosinophils 0.2 0.0 - 0.7 10e9/L    Absolute Basophils 0.1 0.0 - 0.2 10e9/L    Abs Immature Granulocytes 0.0 0 - 0.4 10e9/L    Absolute Nucleated RBC 0.0    ANCA IgG by IFA with Reflex to Titer   Result Value Ref Range    Neutrophil Cytoplasmic Antibody <1:10 <1:10 [titer]    Neutrophil Cytoplasmic Antibody Pattern       The ANCA IFA is <1:10.  No further testing will be performed.   Protein  random urine with Creat Ratio   Result Value Ref Range    Protein Random Urine <0.05 g/L    Protein Total Urine g/gr Creatinine Unable to calculate due to low value 0 - 0.2 g/g Cr   UA with Microscopic reflex to Culture   Result Value Ref Range    Color Urine Yellow     Appearance Urine Clear     Glucose Urine Negative NEG^Negative mg/dL    Bilirubin Urine Negative NEG^Negative    Ketones Urine Negative NEG^Negative mg/dL    Specific Gravity Urine 1.006 1.003 - 1.035    Blood Urine Negative NEG^Negative    pH Urine 7.0 5.0 - 7.0 pH    Protein Albumin Urine Negative NEG^Negative mg/dL    Urobilinogen mg/dL 0.0 0.0 - 2.0 mg/dL    Nitrite Urine Negative NEG^Negative    Leukocyte Esterase Urine Negative NEG^Negative    Source Midstream Urine     WBC Urine 0 0 - 5 /HPF    RBC Urine 0 0 - 2 /HPF    Squamous Epithelial /HPF Urine <1 0 - 1 /HPF   Vasculitis panel   Result Value Ref Range    Myeloperoxidase Antibody IgG 4.7 (H) 0.0 - 0.9 AI      Comment:      Positive  Antibody index (AI) values reflect qualitative changes in antibody   concentration that cannot be directly associated with clinical condition or   disease state.      Proteinase 3 Antibody IgG <0.2 0.0 - 0.9 AI       Comment:      Negative  Antibody index (AI) values reflect qualitative changes in antibody   concentration that cannot be directly associated with clinical condition or   disease state.     Creatinine urine calculation only   Result Value Ref Range    Creatinine Urine 24 mg/dL     Isabella Manley MD

## 2020-03-26 LAB
ANCA AB PATTERN SER IF-IMP: NORMAL
C-ANCA TITR SER IF: NORMAL {TITER}
IGE SERPL-ACNC: 22 KIU/L (ref 0–114)

## 2020-04-30 ENCOUNTER — VIRTUAL VISIT (OUTPATIENT)
Dept: RHEUMATOLOGY | Facility: CLINIC | Age: 40
End: 2020-04-30
Attending: INTERNAL MEDICINE
Payer: COMMERCIAL

## 2020-04-30 DIAGNOSIS — M30.1 EOSINOPHILIC GRANULOMATOSIS WITH POLYANGIITIS (EGPA) (H): ICD-10-CM

## 2020-04-30 DIAGNOSIS — Z51.81 MEDICATION MONITORING ENCOUNTER: ICD-10-CM

## 2020-04-30 DIAGNOSIS — D72.18 EOSINOPHILIC GRANULOMATOSIS WITH POLYANGIITIS (EGPA) (H): ICD-10-CM

## 2020-04-30 DIAGNOSIS — M19.019 SHOULDER ARTHRITIS: Primary | ICD-10-CM

## 2020-04-30 DIAGNOSIS — I77.82 ANCA-ASSOCIATED VASCULITIS (H): ICD-10-CM

## 2020-04-30 RX ORDER — AZATHIOPRINE 50 MG/1
50 TABLET ORAL 2 TIMES DAILY WITH MEALS
Qty: 60 TABLET | Refills: 2 | Status: SHIPPED | OUTPATIENT
Start: 2020-04-30 | End: 2020-08-06

## 2020-04-30 ASSESSMENT — PAIN SCALES - GENERAL: PAINLEVEL: NO PAIN (0)

## 2020-04-30 NOTE — PROGRESS NOTES
Called patient to set up virtual visit, no answer so I left a voicemail.  I will try again before appointment if I do not hear back from him.  Tammy Peoples CMA on 4/30/2020 at 9:15 AM

## 2020-04-30 NOTE — PATIENT INSTRUCTIONS
Labs on 5/7 to be done locally (will fax over the orders)    Stay on imuran 50 mg twice a day    Try the voltaren gel for right shoulder pain    Return 3-4 months

## 2020-04-30 NOTE — PROGRESS NOTES
"Mj Mason Jr. is a 39 year old male who is being evaluated via a billable video visit.      The patient has been notified of following:     \"This video visit will be conducted via a call between you and your physician/provider. We have found that certain health care needs can be provided without the need for an in-person physical exam.  This service lets us provide the care you need with a video conversation.  If a prescription is necessary we can send it directly to your pharmacy.  If lab work is needed we can place an order for that and you can then stop by our lab to have the test done at a later time.    Video visits are billed at different rates depending on your insurance coverage.  Please reach out to your insurance provider with any questions.    If during the course of the call the physician/provider feels a video visit is not appropriate, you will not be charged for this service.\"    Patient has given verbal consent for Video visit? Yes    How would you like to obtain your AVS? [place]    Patient would like the video invitation sent by: Text to cell phone: [phone]    Will anyone else be joining your video visit? No        Video-Visit Details    Type of service:  Video Visit    Video Start Time: 10:40 AM  Video End Time: 11 AM    Originating Location (pt. Location): Home    Distant Location (provider location):  [facility] RHEUMATOLOGY     Platform used for Video Visit: Valerie Manley MD          "

## 2020-08-02 DIAGNOSIS — M30.1 EOSINOPHILIC GRANULOMATOSIS WITH POLYANGIITIS (EGPA) (H): ICD-10-CM

## 2020-08-02 DIAGNOSIS — D72.18 EOSINOPHILIC GRANULOMATOSIS WITH POLYANGIITIS (EGPA) (H): ICD-10-CM

## 2020-08-06 RX ORDER — AZATHIOPRINE 50 MG/1
50 TABLET ORAL 2 TIMES DAILY WITH MEALS
Qty: 60 TABLET | Refills: 0 | Status: SHIPPED | OUTPATIENT
Start: 2020-08-06 | End: 2020-09-08

## 2020-08-06 NOTE — TELEPHONE ENCOUNTER
Monitoring labs required every 8-12 weeks for medication refills.  AZATHIOPRINE 50MG TABS       Last Written Prescription Date:  4/30/20  Last Fill Quantity: 60,   # refills: 2  Last Virtual Visit: 4/30/20 with recommended 3-4 month follow up  Future Office visit:  None scheduled    CBC RESULTS:   Recent Labs   Lab Test 03/25/20  0958   WBC 3.8*   RBC 4.53   HGB 15.1   HCT 45.3      MCH 33.3*   MCHC 33.3   RDW 13.0          Creatinine   Date Value Ref Range Status   03/25/2020 0.98 0.66 - 1.25 mg/dL Final   ]    Liver Function Studies -   Recent Labs   Lab Test 03/25/20  0958   PROTTOTAL 7.5   ALBUMIN 4.4   BILITOTAL 0.6   ALKPHOS 52   AST 28   ALT 45       Routing refill request to provider for review/approval because:  Drug not on the [facility], [facility] or [facility] refill protocol   Last labs done 5/18/20 in care everywhere

## 2020-09-02 DIAGNOSIS — D72.18 EOSINOPHILIC GRANULOMATOSIS WITH POLYANGIITIS (EGPA) (H): ICD-10-CM

## 2020-09-02 DIAGNOSIS — M30.1 EOSINOPHILIC GRANULOMATOSIS WITH POLYANGIITIS (EGPA) (H): ICD-10-CM

## 2020-09-08 ENCOUNTER — MYC REFILL (OUTPATIENT)
Dept: RHEUMATOLOGY | Facility: CLINIC | Age: 40
End: 2020-09-08

## 2020-09-08 DIAGNOSIS — M30.1 EOSINOPHILIC GRANULOMATOSIS WITH POLYANGIITIS (EGPA) (H): ICD-10-CM

## 2020-09-08 DIAGNOSIS — D72.18 EOSINOPHILIC GRANULOMATOSIS WITH POLYANGIITIS (EGPA) (H): ICD-10-CM

## 2020-09-08 RX ORDER — AZATHIOPRINE 50 MG/1
TABLET ORAL
Qty: 60 TABLET | Refills: 0 | Status: SHIPPED | OUTPATIENT
Start: 2020-09-08 | End: 2020-10-09

## 2020-09-08 NOTE — TELEPHONE ENCOUNTER
.  azaTHIOprine (IMURAN) 50 MG tablet   Take 1 tablet (50 mg) by mouth 2 times daily (with meals    Last Written Prescription Date:  8/6/20  Last Fill Quantity: 60,   # refills: 0  Last Office Visit: 4/30/20  Future Office visit:  10/2/20    Outside labs 5/18/20  CBC, creatinine and liver function panel    Routing refill request to provider for review/approval because:  Med not on the refill protocol     Overdue labs     .

## 2020-09-09 RX ORDER — AZATHIOPRINE 50 MG/1
50 TABLET ORAL 2 TIMES DAILY WITH MEALS
Qty: 60 TABLET | Refills: 0 | OUTPATIENT
Start: 2020-09-09

## 2020-10-06 DIAGNOSIS — M30.1 EOSINOPHILIC GRANULOMATOSIS WITH POLYANGIITIS (EGPA) (H): ICD-10-CM

## 2020-10-06 DIAGNOSIS — D72.18 EOSINOPHILIC GRANULOMATOSIS WITH POLYANGIITIS (EGPA) (H): ICD-10-CM

## 2020-10-09 NOTE — TELEPHONE ENCOUNTER
azaTHIOprine (IMURAN) 50 MG tablet      Last Written Prescription Date:  9-8-2020  Last Fill Quantity: 60,   # refills: 0  Last Office Visit: 4-  Future Office visit:  10-    CBC RESULTS:   Recent Labs   Lab Test 03/25/20  0958   WBC 3.8*   RBC 4.53   HGB 15.1   HCT 45.3      MCH 33.3*   MCHC 33.3   RDW 13.0          Creatinine   Date Value Ref Range Status   03/25/2020 0.98 0.66 - 1.25 mg/dL Final   ]    Liver Function Studies -   Recent Labs   Lab Test 03/25/20  0958   PROTTOTAL 7.5   ALBUMIN 4.4   BILITOTAL 0.6   ALKPHOS 52   AST 28   ALT 45       Routing refill request to provider for review/approval because:  Outside labs from May in Epic   However labs still remain overdue.        Kathleen M Doege RN

## 2020-10-10 DIAGNOSIS — D72.18 EOSINOPHILIC GRANULOMATOSIS WITH POLYANGIITIS (EGPA) (H): Primary | ICD-10-CM

## 2020-10-10 DIAGNOSIS — Z51.81 MEDICATION MONITORING ENCOUNTER: ICD-10-CM

## 2020-10-10 DIAGNOSIS — M30.1 EOSINOPHILIC GRANULOMATOSIS WITH POLYANGIITIS (EGPA) (H): Primary | ICD-10-CM

## 2020-10-10 RX ORDER — AZATHIOPRINE 50 MG/1
50 TABLET ORAL 2 TIMES DAILY
Qty: 60 TABLET | Refills: 0 | Status: SHIPPED | OUTPATIENT
Start: 2020-10-10 | End: 2020-10-30

## 2020-10-14 NOTE — TELEPHONE ENCOUNTER
Called and spoke with pt, he would like to have labs done at his local clinic.  Did discuss that with that comes a responsibility to let us know when he has labs done.  He understands.  I have faxed lab orders to his local clinic today.    Hannah Flores RN  [facility]ology Clinic

## 2020-10-30 ENCOUNTER — VIRTUAL VISIT (OUTPATIENT)
Dept: RHEUMATOLOGY | Facility: CLINIC | Age: 40
End: 2020-10-30
Attending: INTERNAL MEDICINE
Payer: COMMERCIAL

## 2020-10-30 DIAGNOSIS — D72.18 EOSINOPHILIC GRANULOMATOSIS WITH POLYANGIITIS (EGPA) (H): ICD-10-CM

## 2020-10-30 DIAGNOSIS — Z51.81 MEDICATION MONITORING ENCOUNTER: Primary | ICD-10-CM

## 2020-10-30 DIAGNOSIS — M30.1 EOSINOPHILIC GRANULOMATOSIS WITH POLYANGIITIS (EGPA) (H): ICD-10-CM

## 2020-10-30 PROCEDURE — 99214 OFFICE O/P EST MOD 30 MIN: CPT | Mod: 95 | Performed by: INTERNAL MEDICINE

## 2020-10-30 RX ORDER — AZATHIOPRINE 50 MG/1
50 TABLET ORAL 2 TIMES DAILY
Qty: 180 TABLET | Refills: 1 | Status: SHIPPED | OUTPATIENT
Start: 2020-10-30 | End: 2021-05-07

## 2020-10-30 ASSESSMENT — PAIN SCALES - GENERAL: PAINLEVEL: NO PAIN (0)

## 2020-10-30 NOTE — LETTER
"10/30/2020       RE: Mj Mason Jr.  [address]  [place] [address]     Dear Colleague,    Thank you for referring your patient, Mj Mason Jr., to the [facility] [place] at [facility]. Please see a copy of my visit note below.    Mj Mason Jr. is a 40 year old male who is being evaluated via a billable video visit.      The patient has been notified of following:     \"This video visit will be conducted via a call between you and your physician/provider. We have found that certain health care needs can be provided without the need for an in-person physical exam.  This service lets us provide the care you need with a video conversation.  If a prescription is necessary we can send it directly to your pharmacy.  If lab work is needed we can place an order for that and you can then stop by our lab to have the test done at a later time.    Video visits are billed at different rates depending on your insurance coverage.  Please reach out to your insurance provider with any questions.    If during the course of the call the physician/provider feels a video visit is not appropriate, you will not be charged for this service.\"    Patient has given verbal consent for Video visit? Yes  How would you like to obtain your AVS? Mail a copy  If you are dropped from the video visit, the video invite should be resent to: Send to e-mail at: [email or web address]  Will anyone else be joining your video visit? No        Video-Visit Details    Type of service:  Video Visit    1:36-1:57 pm    Originating Location (pt. 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    He is doing well. Reports one episode of stuffy nose, never lost sense smell like other episodes before starting imuran. Has not traveled recently due to COVID pandemic which usually is a trigger for his nasal polyps. Keeps up using sinus rinse.    New complaint is foot cramping, it happened 3 times over past 2 weeks. He started working out recently and is wondering if dehydration has a role.    No foot numbness or foot drop, no epistaxis, no cough or SOB, no skin rash.    Had surgery for R shoulder rotator cuff full tear about 3.5 months ago which was successful.        PMHx: EGPA    Family History   Problem Relation Age of Onset     Colon Cancer Maternal Grandmother      Stomach Cancer Paternal Grandmother      No Known Problems Mother      No Known Problems Father      No Known Problems Maternal Grandfather      No Known Problems Paternal Grandfather      No Known Problems Brother      No Known Problems Sister      No Known Problems Son      No Known Problems Daughter      No Known Problems Maternal Half-Brother      No Known Problems Maternal Half-Sister      No Known Problems Paternal Half-Brother      No Known Problems Paternal Half-Sister      No Known Problems Niece      No Known Problems Nephew      No Known Problems Cousin      No Known Problems Other      Cancer No family hx of      Diabetes No family hx of      Heart Disease No family hx of      Hypertension No family hx of      Cerebrovascular Disease No family hx of      Asthma No family hx of      Thyroid Disease No family hx of      Depression No family hx of      Mental Illness No family hx of      Substance Abuse No family hx of      Dementia No family hx of      Bleeding Disorder No family hx of      Congenital Anomalies No family hx of      Migraines No family hx of       Stomach Problem No family hx of      Muscular Disorder No family hx of      Osteoporosis No family hx of      Unknown/Adopted No family hx of      Social History     Socioeconomic History     Marital status:      Spouse name: Not on file     Number of children: 2     Years of education: Not on file     Highest education level: Not on file   Occupational History     Occupation:  manager   Social Needs     Financial resource strain: Not on file     Food insecurity     Worry: Not on file     Inability: Not on file     Transportation needs     Medical: Not on file     Non-medical: Not on file   Tobacco Use     Smoking status: Never Smoker     Smokeless tobacco: Never Used   Substance and Sexual Activity     Alcohol use: Yes     Frequency: 2-3 times a week     Drug use: Never     Sexual activity: Not on file   Lifestyle     Physical activity     Days per week: Not on file     Minutes per session: Not on file     Stress: Not on file   Relationships     Social connections     Talks on phone: Not on file     Gets together: Not on file     Attends Yazdanism service: Not on file     Active member of club or organization: Not on file     Attends meetings of clubs or organizations: Not on file     Relationship status: Not on file     Intimate partner violence     Fear of current or ex partner: Not on file     Emotionally abused: Not on file     Physically abused: Not on file     Forced sexual activity: Not on file   Other Topics Concern     Not on file   Social History Narrative     Not on file       No Known Allergies    Outpatient Encounter Medications as of 10/30/2020   Medication Sig Dispense Refill     albuterol (PROAIR HFA/PROVENTIL HFA/VENTOLIN HFA) 108 (90 Base) MCG/ACT inhaler as needed        azaTHIOprine (IMURAN) 50 MG tablet Take 1 tablet (50 mg) by mouth 2 times daily 60 tablet 0     budesonide (PULMICORT) 1 MG/2ML neb solution Take 1 mg by nebulization daily        cetirizine (ZYRTEC ALLERGY) 10  MG tablet Take 10 mg by mouth daily        clindamycin phosphate (EVOCLIN) 1 % external foam daily        FLOVENT  MCG/ACT inhaler Inhale 1 puff into the lungs daily        montelukast (SINGULAIR) 10 MG tablet Take 10 mg by mouth At Bedtime        SYMBICORT 160-4.5 MCG/ACT Inhaler Inhale 2 puffs into the lungs 2 times daily        diclofenac (VOLTAREN) 1 % topical gel Apply 2 g topically 4 times daily as needed for moderate pain To use over R shoulder (Patient not taking: Reported on 10/30/2020) 100 g 1     tretinoin (RETIN-A) 0.1 % external cream Apply topically as needed        No facility-administered encounter medications on file as of 10/30/2020.            ROS:  A comprehensive ROS was done, positives are per HPI.        His records were reviewed.    4/2019: pos MPO, p-ANCA    7/2019: NL ESR/CRP    Component      Latest Ref Rng & Units 3/25/2020   WBC      4.0 - 11.0 10e9/L 3.8 (L)   RBC Count      4.4 - 5.9 10e12/L 4.53   Hemoglobin      13.3 - 17.7 g/dL 15.1   Hematocrit      40.0 - 53.0 % 45.3   MCV      78 - 100 fl 100   MCH      26.5 - 33.0 pg 33.3 (H)   MCHC      31.5 - 36.5 g/dL 33.3   RDW      10.0 - 15.0 % 13.0   Platelet Count      150 - 450 10e9/L 235   Diff Method       Automated Method   % Neutrophils      % 43.3   % Lymphocytes      % 41.0   % Monocytes      % 9.0   % Eosinophils      % 4.8   % Basophils      % 1.6   % Immature Granulocytes      % 0.3   Nucleated RBCs      0 /100 0   Absolute Neutrophil      1.6 - 8.3 10e9/L 1.6   Absolute Lymphocytes      0.8 - 5.3 10e9/L 1.5   Absolute Monocytes      0.0 - 1.3 10e9/L 0.3   Absolute Eosinophils      0.0 - 0.7 10e9/L 0.2   Absolute Basophils      0.0 - 0.2 10e9/L 0.1   Abs Immature Granulocytes      0 - 0.4 10e9/L 0.0   Absolute Nucleated RBC       0.0   Sodium      133 - 144 mmol/L 140   Potassium      3.4 - 5.3 mmol/L 3.6   Chloride      94 - 109 mmol/L 106   Carbon Dioxide      20 - 32 mmol/L 32   Anion Gap      3 - 14 mmol/L 2 (L)    Glucose      70 - 99 mg/dL 53 (L)   Urea Nitrogen      7 - 30 mg/dL 22   Creatinine      0.66 - 1.25 mg/dL 0.98   GFR Estimate      >60 mL/min/1.73:m2 >90   GFR Estimate If Black      >60 mL/min/1.73:m2 >90   Calcium      8.5 - 10.1 mg/dL 9.1   Bilirubin Total      0.2 - 1.3 mg/dL 0.6   Albumin      3.4 - 5.0 g/dL 4.4   Protein Total      6.8 - 8.8 g/dL 7.5   Alkaline Phosphatase      40 - 150 U/L 52   ALT      0 - 70 U/L 45   AST      0 - 45 U/L 28   Color Urine       Yellow   Appearance Urine       Clear   Glucose Urine      NEG:Negative mg/dL Negative   Bilirubin Urine      NEG:Negative Negative   Ketones Urine      NEG:Negative mg/dL Negative   Specific Gravity Urine      1.003 - 1.035 1.006   Blood Urine      NEG:Negative Negative   pH Urine      5.0 - 7.0 pH 7.0   Protein Albumin Urine      NEG:Negative mg/dL Negative   Urobilinogen mg/dL      0.0 - 2.0 mg/dL 0.0   Nitrite Urine      NEG:Negative Negative   Leukocyte Esterase Urine      NEG:Negative Negative   Source       Midstream Urine   WBC Urine      0 - 5 /HPF 0   RBC Urine      0 - 2 /HPF 0   Squamous Epithelial /HPF Urine      0 - 1 /HPF <1   Neutrophil Cytoplasmic Antibody      <1:10 titer <1:10   Neutrophil Cytoplasmic Antibody Pattern       The ANCA IFA is <1:10.  No further testing will be performed.   Protein Random Urine      g/L <0.05   Protein Total Urine g/gr Creatinine      0 - 0.2 g/g Cr Unable to calculate due to low value   Myeloperoxidase Antibody IgG      0.0 - 0.9 AI 4.7 (H)   Proteinase 3 Antibody IgG      0.0 - 0.9 AI <0.2   IGE      0 - 114 KIU/L 22   CRP Inflammation      0.0 - 8.0 mg/L <2.9   Sed Rate      0 - 15 mm/h 5   Creatinine Urine      mg/dL 24     Stable labs in 10/2020    Ph.E:    Limited, video visit.    Constitutional: WD/WN. Pleasant. In no acute distress.  Eyes: EOM intact, sclera anicteric, conj not injected. No saddle nose deformity  MS: No joint swelling.   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 Failed methotrexate at 6 tab qwk (did not tolerate higher dose of 8 tab qwk), off prednisone. On AZA since 8/2019 (NL TPMT) but was put on hold in 11/2019 given recurrent sinusitis, antibiotic tx, finally required another sinus surgery/polypectomy for the flare on 12/10/2019 which was successful but the goal is to prevent recurrence of polyps and control disease. He was put back on AZA 50 mg bid since end of 3/2020, he had a transient elevated LFTs before resuming AZA which self-resolved without any intervention. Labs in 3/2020 showed improvement of MPO even before resuming AZA.    On AZA 50 mg bid since end of 3/2020. Tolerates it well. Reports improvement of rhinitis and asthma sx, but has not had a follow up ENT vist to be checked for recurrence of nasal polyps. He will do follow up. Labs from 9/2020 are stable. His EGPA seems to be stable.        Today's plan:    Continue imuran 50 mg twice a day    Consider Nucala if imuran stops working and if f/u ENT visit shows recurrence of nasal polyps    Labs every 3 months, next due in 1/2021 (local lab)    Return in 6 months        Orders Placed This Encounter   Procedures     AST     ALT     Myeloperoxidase and Proteinase 3 Panel     Albumin level     CBC with platelets differential     Creatinine     CRP inflammation     UA with Microscopic reflex to Culture     Protein  random urine with Creat Ratio     Creatinine random urine     Erythrocyte sedimentation rate auto     CD19 B Cell Count     ANCA IgG by IFA with Reflex to Titer     Immunoglobulins A G and M       Video visit: 1:36-1:57 pm    Isabella Manley MD

## 2020-10-30 NOTE — PROGRESS NOTES
"Mj Mason Jr. is a 40 year old male who is being evaluated via a billable video visit.      The patient has been notified of following:     \"This video visit will be conducted via a call between you and your physician/provider. We have found that certain health care needs can be provided without the need for an in-person physical exam.  This service lets us provide the care you need with a video conversation.  If a prescription is necessary we can send it directly to your pharmacy.  If lab work is needed we can place an order for that and you can then stop by our lab to have the test done at a later time.    Video visits are billed at different rates depending on your insurance coverage.  Please reach out to your insurance provider with any questions.    If during the course of the call the physician/provider feels a video visit is not appropriate, you will not be charged for this service.\"    Patient has given verbal consent for Video visit? Yes  How would you like to obtain your AVS? Mail a copy  If you are dropped from the video visit, the video invite should be resent to: Send to e-mail at: [email or web address]  Will anyone else be joining your video visit? No        Video-Visit Details    Type of service:  Video Visit    1:36-1:57 pm    Originating Location (pt. Location): Home    Distant Location (provider location):  [facility] [place]     Platform used for Video Visit: Valerie Manley MD      "

## 2020-10-30 NOTE — PROGRESS NOTES
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    He is doing well. Reports one episode of stuffy nose, never lost sense smell like other episodes before starting imuran. Has not traveled recently due to COVID pandemic which usually is a trigger for his nasal polyps. Keeps up using sinus rinse.    New complaint is foot cramping, it happened 3 times over past 2 weeks. He started working out recently and is wondering if dehydration has a role.    No foot numbness or foot drop, no epistaxis, no cough or SOB, no skin rash.    Had surgery for R shoulder rotator cuff full tear about 3.5 months ago which was successful.        PMHx: EGPA    Family History   Problem Relation Age of Onset     Colon Cancer Maternal Grandmother      Stomach Cancer Paternal Grandmother      No Known Problems Mother      No Known Problems Father      No Known Problems Maternal Grandfather      No Known Problems Paternal Grandfather      No Known Problems Brother      No Known Problems Sister      No Known Problems Son      No Known Problems Daughter      No Known Problems Maternal Half-Brother      No Known Problems Maternal Half-Sister      No Known Problems Paternal Half-Brother      No Known Problems Paternal Half-Sister      No Known Problems Niece      No Known Problems Nephew      No Known Problems Cousin      No Known Problems Other      Cancer No family hx of      Diabetes No family hx of      Heart Disease No family hx of      Hypertension No family hx of      Cerebrovascular Disease No family hx of      Asthma No family hx of      Thyroid Disease No family hx of      Depression No family hx of      Mental Illness No family hx of       Substance Abuse No family hx of      Dementia No family hx of      Bleeding Disorder No family hx of      Congenital Anomalies No family hx of      Migraines No family hx of      Stomach Problem No family hx of      Muscular Disorder No family hx of      Osteoporosis No family hx of      Unknown/Adopted No family hx of      Social History     Socioeconomic History     Marital status:      Spouse name: Not on file     Number of children: 2     Years of education: Not on file     Highest education level: Not on file   Occupational History     Occupation:  manager   Social Needs     Financial resource strain: Not on file     Food insecurity     Worry: Not on file     Inability: Not on file     Transportation needs     Medical: Not on file     Non-medical: Not on file   Tobacco Use     Smoking status: Never Smoker     Smokeless tobacco: Never Used   Substance and Sexual Activity     Alcohol use: Yes     Frequency: 2-3 times a week     Drug use: Never     Sexual activity: Not on file   Lifestyle     Physical activity     Days per week: Not on file     Minutes per session: Not on file     Stress: Not on file   Relationships     Social connections     Talks on phone: Not on file     Gets together: Not on file     Attends Mandaen service: Not on file     Active member of club or organization: Not on file     Attends meetings of clubs or organizations: Not on file     Relationship status: Not on file     Intimate partner violence     Fear of current or ex partner: Not on file     Emotionally abused: Not on file     Physically abused: Not on file     Forced sexual activity: Not on file   Other Topics Concern     Not on file   Social History Narrative     Not on file       No Known Allergies    Outpatient Encounter Medications as of 10/30/2020   Medication Sig Dispense Refill     albuterol (PROAIR HFA/PROVENTIL HFA/VENTOLIN HFA) 108 (90 Base) MCG/ACT inhaler as needed        azaTHIOprine (IMURAN) 50 MG tablet  Take 1 tablet (50 mg) by mouth 2 times daily 60 tablet 0     budesonide (PULMICORT) 1 MG/2ML neb solution Take 1 mg by nebulization daily        cetirizine (ZYRTEC ALLERGY) 10 MG tablet Take 10 mg by mouth daily        clindamycin phosphate (EVOCLIN) 1 % external foam daily        FLOVENT  MCG/ACT inhaler Inhale 1 puff into the lungs daily        montelukast (SINGULAIR) 10 MG tablet Take 10 mg by mouth At Bedtime        SYMBICORT 160-4.5 MCG/ACT Inhaler Inhale 2 puffs into the lungs 2 times daily        diclofenac (VOLTAREN) 1 % topical gel Apply 2 g topically 4 times daily as needed for moderate pain To use over R shoulder (Patient not taking: Reported on 10/30/2020) 100 g 1     tretinoin (RETIN-A) 0.1 % external cream Apply topically as needed        No facility-administered encounter medications on file as of 10/30/2020.            ROS:  A comprehensive ROS was done, positives are per HPI.        His records were reviewed.    4/2019: pos MPO, p-ANCA    7/2019: NL ESR/CRP    Component      Latest Ref Rng & Units 3/25/2020   WBC      4.0 - 11.0 10e9/L 3.8 (L)   RBC Count      4.4 - 5.9 10e12/L 4.53   Hemoglobin      13.3 - 17.7 g/dL 15.1   Hematocrit      40.0 - 53.0 % 45.3   MCV      78 - 100 fl 100   MCH      26.5 - 33.0 pg 33.3 (H)   MCHC      31.5 - 36.5 g/dL 33.3   RDW      10.0 - 15.0 % 13.0   Platelet Count      150 - 450 10e9/L 235   Diff Method       Automated Method   % Neutrophils      % 43.3   % Lymphocytes      % 41.0   % Monocytes      % 9.0   % Eosinophils      % 4.8   % Basophils      % 1.6   % Immature Granulocytes      % 0.3   Nucleated RBCs      0 /100 0   Absolute Neutrophil      1.6 - 8.3 10e9/L 1.6   Absolute Lymphocytes      0.8 - 5.3 10e9/L 1.5   Absolute Monocytes      0.0 - 1.3 10e9/L 0.3   Absolute Eosinophils      0.0 - 0.7 10e9/L 0.2   Absolute Basophils      0.0 - 0.2 10e9/L 0.1   Abs Immature Granulocytes      0 - 0.4 10e9/L 0.0   Absolute Nucleated RBC       0.0   Sodium       133 - 144 mmol/L 140   Potassium      3.4 - 5.3 mmol/L 3.6   Chloride      94 - 109 mmol/L 106   Carbon Dioxide      20 - 32 mmol/L 32   Anion Gap      3 - 14 mmol/L 2 (L)   Glucose      70 - 99 mg/dL 53 (L)   Urea Nitrogen      7 - 30 mg/dL 22   Creatinine      0.66 - 1.25 mg/dL 0.98   GFR Estimate      >60 mL/min/1.73:m2 >90   GFR Estimate If Black      >60 mL/min/1.73:m2 >90   Calcium      8.5 - 10.1 mg/dL 9.1   Bilirubin Total      0.2 - 1.3 mg/dL 0.6   Albumin      3.4 - 5.0 g/dL 4.4   Protein Total      6.8 - 8.8 g/dL 7.5   Alkaline Phosphatase      40 - 150 U/L 52   ALT      0 - 70 U/L 45   AST      0 - 45 U/L 28   Color Urine       Yellow   Appearance Urine       Clear   Glucose Urine      NEG:Negative mg/dL Negative   Bilirubin Urine      NEG:Negative Negative   Ketones Urine      NEG:Negative mg/dL Negative   Specific Gravity Urine      1.003 - 1.035 1.006   Blood Urine      NEG:Negative Negative   pH Urine      5.0 - 7.0 pH 7.0   Protein Albumin Urine      NEG:Negative mg/dL Negative   Urobilinogen mg/dL      0.0 - 2.0 mg/dL 0.0   Nitrite Urine      NEG:Negative Negative   Leukocyte Esterase Urine      NEG:Negative Negative   Source       Midstream Urine   WBC Urine      0 - 5 /HPF 0   RBC Urine      0 - 2 /HPF 0   Squamous Epithelial /HPF Urine      0 - 1 /HPF <1   Neutrophil Cytoplasmic Antibody      <1:10 titer <1:10   Neutrophil Cytoplasmic Antibody Pattern       The ANCA IFA is <1:10.  No further testing will be performed.   Protein Random Urine      g/L <0.05   Protein Total Urine g/gr Creatinine      0 - 0.2 g/g Cr Unable to calculate due to low value   Myeloperoxidase Antibody IgG      0.0 - 0.9 AI 4.7 (H)   Proteinase 3 Antibody IgG      0.0 - 0.9 AI <0.2   IGE      0 - 114 KIU/L 22   CRP Inflammation      0.0 - 8.0 mg/L <2.9   Sed Rate      0 - 15 mm/h 5   Creatinine Urine      mg/dL 24     Stable labs in 10/2020    Ph.E:    Limited, video visit.    Constitutional: WD/WN. Pleasant. In no acute  distress.  Eyes: EOM intact, sclera anicteric, conj not injected. No saddle nose deformity  MS: No joint swelling.   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 Failed methotrexate at 6 tab qwk (did not tolerate higher dose of 8 tab qwk), off prednisone. On AZA since 8/2019 (NL TPMT) but was put on hold in 11/2019 given recurrent sinusitis, antibiotic tx, finally required another sinus surgery/polypectomy for the flare on 12/10/2019 which was successful but the goal is to prevent recurrence of polyps and control disease. He was put back on AZA 50 mg bid since end of 3/2020, he had a transient elevated LFTs before resuming AZA which self-resolved without any intervention. Labs in 3/2020 showed improvement of MPO even before resuming AZA.    On AZA 50 mg bid since end of 3/2020. Tolerates it well. Reports improvement of rhinitis and asthma sx, but has not had a follow up ENT vist to be checked for recurrence of nasal polyps. He will do follow up. Labs from 9/2020 are stable. His EGPA seems to be stable.        Today's plan:    Continue imuran 50 mg twice a day    Consider Nucala if imuran stops working and if f/u ENT visit shows recurrence of nasal polyps    Labs every 3 months, next due in 1/2021 (local lab)    Return in 6 months        Orders Placed This Encounter   Procedures     AST     ALT     Myeloperoxidase and Proteinase 3 Panel     Albumin level     CBC with platelets differential     Creatinine     CRP inflammation     UA with Microscopic reflex to Culture     Protein  random urine with Creat Ratio     Creatinine random urine     Erythrocyte sedimentation rate auto     CD19 B Cell Count     ANCA IgG by IFA with Reflex to Titer     Immunoglobulins A G and M       Video visit: 1:36-1:57 pm    Isabella Manley MD

## 2020-11-03 ENCOUNTER — MYC MEDICAL ADVICE (OUTPATIENT)
Dept: RHEUMATOLOGY | Facility: CLINIC | Age: 40
End: 2020-11-03

## 2020-11-08 ENCOUNTER — HEALTH MAINTENANCE LETTER (OUTPATIENT)
Age: 40
End: 2020-11-08

## 2021-01-06 ENCOUNTER — NURSE TRIAGE (OUTPATIENT)
Dept: NURSING | Facility: CLINIC | Age: 41
End: 2021-01-06

## 2021-01-06 NOTE — TELEPHONE ENCOUNTER
Called to find out if he has his blood type listed in chart or done.  Ene Villavicencio, SHANEKA  Reason for Disposition    [1] Caller requesting NON-URGENT health information AND [2] PCP's office is the best resource    Additional Information    Negative: [1] Caller is not with the adult (patient) AND [2] reporting urgent symptoms    Negative: Lab result questions    Negative: Medication questions    Negative: Caller can't be reached by phone    Negative: Caller has already spoken to PCP or another triager    Negative: RN needs further essential information from caller in order to complete triage    Negative: Requesting regular office appointment    Protocols used: INFORMATION ONLY CALL-A-

## 2021-02-24 ENCOUNTER — MYC MEDICAL ADVICE (OUTPATIENT)
Dept: RHEUMATOLOGY | Facility: CLINIC | Age: 41
End: 2021-02-24

## 2021-05-05 DIAGNOSIS — M30.1 EOSINOPHILIC GRANULOMATOSIS WITH POLYANGIITIS (EGPA) (H): ICD-10-CM

## 2021-05-05 DIAGNOSIS — D72.18 EOSINOPHILIC GRANULOMATOSIS WITH POLYANGIITIS (EGPA) (H): ICD-10-CM

## 2021-05-07 NOTE — TELEPHONE ENCOUNTER
azaTHIOprine (IMURAN) 50 MG    Last Written Prescription Date:  10/30/20  Last Fill Quantity: 180,   # refills: 1  Last Office Visit: 10/30/20  Future Office visit:  7/23/21  RTC   6 MOS    CBC RESULTS:   Recent Labs   Lab Test 03/25/20  0958   WBC 3.8*   RBC 4.53   HGB 15.1   HCT 45.3      MCH 33.3*   MCHC 33.3   RDW 13.0          Creatinine   Date Value Ref Range Status   03/25/2020 0.98 0.66 - 1.25 mg/dL Final   ]    Liver Function Studies -   Recent Labs   Lab Test 03/25/20  0958   PROTTOTAL 7.5   ALBUMIN 4.4   BILITOTAL 0.6   ALKPHOS 52   AST 28   ALT 45     Routing refill request to provider for review/approval because:  DMARD    LABS 2/9/21  OUTSIDE REC   OVER DUE RTC ( 6 MOS April 2021)

## 2021-05-08 RX ORDER — AZATHIOPRINE 50 MG/1
TABLET ORAL
Qty: 180 TABLET | Refills: 0 | Status: SHIPPED | OUTPATIENT
Start: 2021-05-08 | End: 2021-08-18

## 2021-08-13 DIAGNOSIS — D72.18 EOSINOPHILIC GRANULOMATOSIS WITH POLYANGIITIS (EGPA) (H): ICD-10-CM

## 2021-08-13 DIAGNOSIS — M30.1 EOSINOPHILIC GRANULOMATOSIS WITH POLYANGIITIS (EGPA) (H): ICD-10-CM

## 2021-08-17 ENCOUNTER — MYC MEDICAL ADVICE (OUTPATIENT)
Dept: RHEUMATOLOGY | Facility: CLINIC | Age: 41
End: 2021-08-17

## 2021-08-17 NOTE — TELEPHONE ENCOUNTER
AZATHIOPRINE 50MG TABS      Last Written Prescription Date:    Last Fill Quantity: 180,   # refills: 0  Last Office Visit: 10-  Future Office visit:  8-    CBC RESULTS: Recent Labs   Lab Test 03/25/20  0958   WBC 3.8*   RBC 4.53   HGB 15.1   HCT 45.3      MCH 33.3*   MCHC 33.3   RDW 13.0          Creatinine   Date Value Ref Range Status   03/25/2020 0.98 0.66 - 1.25 mg/dL Final   ]    Liver Function Studies -   Recent Labs   Lab Test 03/25/20 0958   PROTTOTAL 7.5   ALBUMIN 4.4   BILITOTAL 0.6   ALKPHOS 52   AST 28   ALT 45       Routing refill request to provider for review/approval because:  Not on protocol.  Overdue for labs.      Kathleen M Doege RN

## 2021-08-18 RX ORDER — AZATHIOPRINE 50 MG/1
TABLET ORAL
Qty: 180 TABLET | Refills: 0 | Status: SHIPPED | OUTPATIENT
Start: 2021-08-18 | End: 2022-09-23

## 2021-08-20 ENCOUNTER — VIRTUAL VISIT (OUTPATIENT)
Dept: RHEUMATOLOGY | Facility: CLINIC | Age: 41
End: 2021-08-20
Attending: INTERNAL MEDICINE
Payer: COMMERCIAL

## 2021-08-20 DIAGNOSIS — M30.1 EOSINOPHILIC GRANULOMATOSIS WITH POLYANGIITIS (EGPA) (H): Primary | ICD-10-CM

## 2021-08-20 DIAGNOSIS — D72.18 EOSINOPHILIC GRANULOMATOSIS WITH POLYANGIITIS (EGPA) (H): Primary | ICD-10-CM

## 2021-08-20 DIAGNOSIS — Z51.81 MEDICATION MONITORING ENCOUNTER: ICD-10-CM

## 2021-08-20 PROCEDURE — 99214 OFFICE O/P EST MOD 30 MIN: CPT | Mod: 95 | Performed by: INTERNAL MEDICINE

## 2021-08-20 ASSESSMENT — PAIN SCALES - GENERAL: PAINLEVEL: NO PAIN (0)

## 2021-08-20 NOTE — PROGRESS NOTES
Lucio is a 41 year old who is being evaluated via a billable video visit.      How would you like to obtain your AVS? Tadhart  If the video visit is dropped, the invitation should be resent by: Text to cell phone: [phone]  Will anyone else be joining your video visit? No      Video Start Time: 4:18 PM  Video-Visit Details    Type of service:  Video Visit    Video End Time:4:29 PM    Originating Location (pt. 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    He is doing fairly well. No recurrence of nasal polyps on imuran. Last seen by ENT in 12/2020, has upcoming appointment. Reports having seasonal allergies at this time of the year, has been using his inhalers x past 2 weeks. Reports some nasal congestion, it was better when he ran out of AZA x 3 days early this week.    No joint pain or skin rash.    Had surgery for R shoulder rotator cuff full tear which was successful.    Had J&J covid vaccine on 4/8/2021, tolerated it well.      PMHx: EGPA    Family History   Problem Relation Age of Onset     Colon Cancer Maternal Grandmother      Stomach Cancer Paternal Grandmother      No Known Problems Mother      No Known Problems Father      No Known Problems Maternal Grandfather      No Known Problems Paternal Grandfather      No Known Problems Brother      No Known Problems Sister      No Known Problems Son      No Known Problems Daughter      No Known Problems Maternal Half-Brother      No Known Problems Maternal Half-Sister      No Known Problems Paternal Half-Brother      No Known  Problems Paternal Half-Sister      No Known Problems Niece      No Known Problems Nephew      No Known Problems Cousin      No Known Problems Other      Cancer No family hx of      Diabetes No family hx of      Heart Disease No family hx of      Hypertension No family hx of      Cerebrovascular Disease No family hx of      Asthma No family hx of      Thyroid Disease No family hx of      Depression No family hx of      Mental Illness No family hx of      Substance Abuse No family hx of      Dementia No family hx of      Bleeding Disorder No family hx of      Congenital Anomalies No family hx of      Migraines No family hx of      Stomach Problem No family hx of      Muscular Disorder No family hx of      Osteoporosis No family hx of      Unknown/Adopted No family hx of      Social History     Socioeconomic History     Marital status:      Spouse name: Not on file     Number of children: 2     Years of education: Not on file     Highest education level: Not on file   Occupational History     Occupation:  manager   Tobacco Use     Smoking status: Never Smoker     Smokeless tobacco: Never Used   Substance and Sexual Activity     Alcohol use: Yes     Drug use: Never     Sexual activity: Not on file   Other Topics Concern     Not on file   Social History Narrative     Not on file     Social Determinants of Health     Financial Resource Strain:      Difficulty of Paying Living Expenses:    Food Insecurity:      Worried About Running Out of Food in the Last Year:      Ran Out of Food in the Last Year:    Transportation Needs:      Lack of Transportation (Medical):      Lack of Transportation (Non-Medical):    Physical Activity:      Days of Exercise per Week:      Minutes of Exercise per Session:    Stress:      Feeling of Stress :    Social Connections:      Frequency of Communication with Friends and Family:      Frequency of Social Gatherings with Friends and Family:      Attends Congregation Services:       Active Member of Clubs or Organizations:      Attends Club or Organization Meetings:      Marital Status:    Intimate Partner Violence:      Fear of Current or Ex-Partner:      Emotionally Abused:      Physically Abused:      Sexually Abused:        No Known Allergies    Outpatient Encounter Medications as of 8/20/2021   Medication Sig Dispense Refill     albuterol (PROAIR HFA/PROVENTIL HFA/VENTOLIN HFA) 108 (90 Base) MCG/ACT inhaler as needed        azaTHIOprine (IMURAN) 50 MG tablet TAKE 1 TABLET BY MOUTH TWICE DAILY 180 tablet 0     budesonide (PULMICORT) 1 MG/2ML neb solution Take 1 mg by nebulization daily        cetirizine (ZYRTEC ALLERGY) 10 MG tablet Take 10 mg by mouth daily        clindamycin phosphate (EVOCLIN) 1 % external foam daily        diclofenac (VOLTAREN) 1 % topical gel Apply 2 g topically 4 times daily as needed for moderate pain To use over R shoulder 100 g 1     FLOVENT  MCG/ACT inhaler Inhale 1 puff into the lungs daily        montelukast (SINGULAIR) 10 MG tablet Take 10 mg by mouth At Bedtime        SYMBICORT 160-4.5 MCG/ACT Inhaler Inhale 2 puffs into the lungs 2 times daily        tretinoin (RETIN-A) 0.1 % external cream Apply topically as needed        No facility-administered encounter medications on file as of 8/20/2021.           ROS:  A comprehensive ROS was done, positives are per HPI.        His records were reviewed.    4/2019: pos MPO, p-ANCA    7/2019: NL ESR/CRP    Component      Latest Ref Rng & Units 3/25/2020   WBC      4.0 - 11.0 10e9/L 3.8 (L)   RBC Count      4.4 - 5.9 10e12/L 4.53   Hemoglobin      13.3 - 17.7 g/dL 15.1   Hematocrit      40.0 - 53.0 % 45.3   MCV      78 - 100 fl 100   MCH      26.5 - 33.0 pg 33.3 (H)   MCHC      31.5 - 36.5 g/dL 33.3   RDW      10.0 - 15.0 % 13.0   Platelet Count      150 - 450 10e9/L 235   Diff Method       Automated Method   % Neutrophils      % 43.3   % Lymphocytes      % 41.0   % Monocytes      % 9.0   % Eosinophils      % 4.8    % Basophils      % 1.6   % Immature Granulocytes      % 0.3   Nucleated RBCs      0 /100 0   Absolute Neutrophil      1.6 - 8.3 10e9/L 1.6   Absolute Lymphocytes      0.8 - 5.3 10e9/L 1.5   Absolute Monocytes      0.0 - 1.3 10e9/L 0.3   Absolute Eosinophils      0.0 - 0.7 10e9/L 0.2   Absolute Basophils      0.0 - 0.2 10e9/L 0.1   Abs Immature Granulocytes      0 - 0.4 10e9/L 0.0   Absolute Nucleated RBC       0.0   Sodium      133 - 144 mmol/L 140   Potassium      3.4 - 5.3 mmol/L 3.6   Chloride      94 - 109 mmol/L 106   Carbon Dioxide      20 - 32 mmol/L 32   Anion Gap      3 - 14 mmol/L 2 (L)   Glucose      70 - 99 mg/dL 53 (L)   Urea Nitrogen      7 - 30 mg/dL 22   Creatinine      0.66 - 1.25 mg/dL 0.98   GFR Estimate      >60 mL/min/1.73:m2 >90   GFR Estimate If Black      >60 mL/min/1.73:m2 >90   Calcium      8.5 - 10.1 mg/dL 9.1   Bilirubin Total      0.2 - 1.3 mg/dL 0.6   Albumin      3.4 - 5.0 g/dL 4.4   Protein Total      6.8 - 8.8 g/dL 7.5   Alkaline Phosphatase      40 - 150 U/L 52   ALT      0 - 70 U/L 45   AST      0 - 45 U/L 28   Color Urine       Yellow   Appearance Urine       Clear   Glucose Urine      NEG:Negative mg/dL Negative   Bilirubin Urine      NEG:Negative Negative   Ketones Urine      NEG:Negative mg/dL Negative   Specific Gravity Urine      1.003 - 1.035 1.006   Blood Urine      NEG:Negative Negative   pH Urine      5.0 - 7.0 pH 7.0   Protein Albumin Urine      NEG:Negative mg/dL Negative   Urobilinogen mg/dL      0.0 - 2.0 mg/dL 0.0   Nitrite Urine      NEG:Negative Negative   Leukocyte Esterase Urine      NEG:Negative Negative   Source       Midstream Urine   WBC Urine      0 - 5 /HPF 0   RBC Urine      0 - 2 /HPF 0   Squamous Epithelial /HPF Urine      0 - 1 /HPF <1   Neutrophil Cytoplasmic Antibody      <1:10 titer <1:10   Neutrophil Cytoplasmic Antibody Pattern       The ANCA IFA is <1:10.  No further testing will be performed.   Protein Random Urine      g/L <0.05   Protein  Total Urine g/gr Creatinine      0 - 0.2 g/g Cr Unable to calculate due to low value   Myeloperoxidase Antibody IgG      0.0 - 0.9 AI 4.7 (H)   Proteinase 3 Antibody IgG      0.0 - 0.9 AI <0.2   IGE      0 - 114 KIU/L 22   CRP Inflammation      0.0 - 8.0 mg/L <2.9   Sed Rate      0 - 15 mm/h 5   Creatinine Urine      mg/dL 24     Stable labs in 2/2021    Ph.E:    Constitutional: WD/WN. Pleasant. In no acute distress.  Eyes: EOM intact, sclera anicteric, conj not injected. No saddle nose deformity  MS: No joint swelling.   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 Failed methotrexate at 6 tab qwk (did not tolerate higher dose of 8 tab qwk), off prednisone. On AZA since 8/2019 (NL TPMT) but was put on hold in 11/2019 given recurrent sinusitis, antibiotic tx, finally required another sinus surgery/polypectomy for the flare on 12/10/2019 which was successful but the goal is to prevent recurrence of polyps and control disease. He was put back on AZA 50 mg bid since end of 3/2020, he had a transient elevated LFTs before resuming AZA which self-resolved without any intervention. Labs in 3/2020 showed improvement of MPO even before resuming AZA.    On AZA 50 mg bid since end of 3/2020. Tolerates it well. Reports no recurrence of nasal polyps. He will do follow up with ENT. Labs from 2/2021 are stable. His EGPA seems to be stable. Reports seasonal allergies at this time of the year, using inhalor. Nasal congestion got better by missing AZA x 3 days.    We discussed covid booster vaccine, no official recommendation for J&J receivers yet but most likely will need a booster being immunocompromised.      Today's plan:    Continue imuran 50 mg twice a day but ok to hold it x 5 days in case of viral URI to see if nasal congestion improves.    Consider Nucala if imuran stops working and/or if f/u ENT visit shows recurrence of nasal polyps    Labs every 3  months, overdue, ordered to be done now.    Return in 6 months (video visit)        Orders Placed This Encounter   Procedures     AST     ALT     Myeloperoxidase and Proteinase 3 Panel     Albumin level     CBC with Platelets & Differential     Creatinine     CRP inflammation     UA with Microscopic reflex to Culture     Protein  random urine with Creat Ratio     Creatinine random urine     Erythrocyte sedimentation rate auto     CD19 B Cell Count     ANCA IgG by IFA with Reflex to Titer     Immunoglobulins A G and M           Isabella Manley MD

## 2021-08-20 NOTE — LETTER
8/20/2021       RE: Mj Mason Jr.  [address]  [place] [address]     Dear Colleague,    Thank you for referring your patient, Mj Mason Jr., to the [facility] [place] at [facility]. Please see a copy of my visit note below.    Lucio is a 41 year old who is being evaluated via a billable video visit.      How would you like to obtain your AVS? MyChart  If the video visit is dropped, the invitation should be resent by: Text to cell phone: [phone]  Will anyone else be joining your video visit? No      Video Start Time: 4:18 PM  Video-Visit Details    Type of service:  Video Visit    Video End Time:4:29 PM    Originating Location (pt. 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    He is doing fairly well. No recurrence of nasal polyps on imuran. Last seen by ENT in 12/2020, has upcoming appointment. Reports having seasonal allergies at this time of the year, has been using his inhalers x past 2 weeks. Reports some nasal congestion, it was better when he ran out of AZA x 3 days early this week.    No joint pain or skin rash.    Had surgery for R shoulder rotator cuff full tear which was successful.    Had J&J covid vaccine on 4/8/2021, tolerated it well.      PMHx: EGPA    Family History   Problem Relation Age of Onset     Colon Cancer Maternal Grandmother      Stomach Cancer Paternal Grandmother      No Known Problems Mother      No Known Problems Father      No Known Problems Maternal  Grandfather      No Known Problems Paternal Grandfather      No Known Problems Brother      No Known Problems Sister      No Known Problems Son      No Known Problems Daughter      No Known Problems Maternal Half-Brother      No Known Problems Maternal Half-Sister      No Known Problems Paternal Half-Brother      No Known Problems Paternal Half-Sister      No Known Problems Niece      No Known Problems Nephew      No Known Problems Cousin      No Known Problems Other      Cancer No family hx of      Diabetes No family hx of      Heart Disease No family hx of      Hypertension No family hx of      Cerebrovascular Disease No family hx of      Asthma No family hx of      Thyroid Disease No family hx of      Depression No family hx of      Mental Illness No family hx of      Substance Abuse No family hx of      Dementia No family hx of      Bleeding Disorder No family hx of      Congenital Anomalies No family hx of      Migraines No family hx of      Stomach Problem No family hx of      Muscular Disorder No family hx of      Osteoporosis No family hx of      Unknown/Adopted No family hx of      Social History     Socioeconomic History     Marital status:      Spouse name: Not on file     Number of children: 2     Years of education: Not on file     Highest education level: Not on file   Occupational History     Occupation:  manager   Tobacco Use     Smoking status: Never Smoker     Smokeless tobacco: Never Used   Substance and Sexual Activity     Alcohol use: Yes     Drug use: Never     Sexual activity: Not on file   Other Topics Concern     Not on file   Social History Narrative     Not on file     Social Determinants of Health     Financial Resource Strain:      Difficulty of Paying Living Expenses:    Food Insecurity:      Worried About Running Out of Food in the Last Year:      Ran Out of Food in the Last Year:    Transportation Needs:      Lack of Transportation (Medical):      Lack of Transportation  (Non-Medical):    Physical Activity:      Days of Exercise per Week:      Minutes of Exercise per Session:    Stress:      Feeling of Stress :    Social Connections:      Frequency of Communication with Friends and Family:      Frequency of Social Gatherings with Friends and Family:      Attends Nondenominational Services:      Active Member of Clubs or Organizations:      Attends Club or Organization Meetings:      Marital Status:    Intimate Partner Violence:      Fear of Current or Ex-Partner:      Emotionally Abused:      Physically Abused:      Sexually Abused:        No Known Allergies    Outpatient Encounter Medications as of 8/20/2021   Medication Sig Dispense Refill     albuterol (PROAIR HFA/PROVENTIL HFA/VENTOLIN HFA) 108 (90 Base) MCG/ACT inhaler as needed        azaTHIOprine (IMURAN) 50 MG tablet TAKE 1 TABLET BY MOUTH TWICE DAILY 180 tablet 0     budesonide (PULMICORT) 1 MG/2ML neb solution Take 1 mg by nebulization daily        cetirizine (ZYRTEC ALLERGY) 10 MG tablet Take 10 mg by mouth daily        clindamycin phosphate (EVOCLIN) 1 % external foam daily        diclofenac (VOLTAREN) 1 % topical gel Apply 2 g topically 4 times daily as needed for moderate pain To use over R shoulder 100 g 1     FLOVENT  MCG/ACT inhaler Inhale 1 puff into the lungs daily        montelukast (SINGULAIR) 10 MG tablet Take 10 mg by mouth At Bedtime        SYMBICORT 160-4.5 MCG/ACT Inhaler Inhale 2 puffs into the lungs 2 times daily        tretinoin (RETIN-A) 0.1 % external cream Apply topically as needed        No facility-administered encounter medications on file as of 8/20/2021.           ROS:  A comprehensive ROS was done, positives are per HPI.        His records were reviewed.    4/2019: pos MPO, p-ANCA    7/2019: NL ESR/CRP    Component      Latest Ref Rng & Units 3/25/2020   WBC      4.0 - 11.0 10e9/L 3.8 (L)   RBC Count      4.4 - 5.9 10e12/L 4.53   Hemoglobin      13.3 - 17.7 g/dL 15.1   Hematocrit      40.0 - 53.0  % 45.3   MCV      78 - 100 fl 100   MCH      26.5 - 33.0 pg 33.3 (H)   MCHC      31.5 - 36.5 g/dL 33.3   RDW      10.0 - 15.0 % 13.0   Platelet Count      150 - 450 10e9/L 235   Diff Method       Automated Method   % Neutrophils      % 43.3   % Lymphocytes      % 41.0   % Monocytes      % 9.0   % Eosinophils      % 4.8   % Basophils      % 1.6   % Immature Granulocytes      % 0.3   Nucleated RBCs      0 /100 0   Absolute Neutrophil      1.6 - 8.3 10e9/L 1.6   Absolute Lymphocytes      0.8 - 5.3 10e9/L 1.5   Absolute Monocytes      0.0 - 1.3 10e9/L 0.3   Absolute Eosinophils      0.0 - 0.7 10e9/L 0.2   Absolute Basophils      0.0 - 0.2 10e9/L 0.1   Abs Immature Granulocytes      0 - 0.4 10e9/L 0.0   Absolute Nucleated RBC       0.0   Sodium      133 - 144 mmol/L 140   Potassium      3.4 - 5.3 mmol/L 3.6   Chloride      94 - 109 mmol/L 106   Carbon Dioxide      20 - 32 mmol/L 32   Anion Gap      3 - 14 mmol/L 2 (L)   Glucose      70 - 99 mg/dL 53 (L)   Urea Nitrogen      7 - 30 mg/dL 22   Creatinine      0.66 - 1.25 mg/dL 0.98   GFR Estimate      >60 mL/min/1.73:m2 >90   GFR Estimate If Black      >60 mL/min/1.73:m2 >90   Calcium      8.5 - 10.1 mg/dL 9.1   Bilirubin Total      0.2 - 1.3 mg/dL 0.6   Albumin      3.4 - 5.0 g/dL 4.4   Protein Total      6.8 - 8.8 g/dL 7.5   Alkaline Phosphatase      40 - 150 U/L 52   ALT      0 - 70 U/L 45   AST      0 - 45 U/L 28   Color Urine       Yellow   Appearance Urine       Clear   Glucose Urine      NEG:Negative mg/dL Negative   Bilirubin Urine      NEG:Negative Negative   Ketones Urine      NEG:Negative mg/dL Negative   Specific Gravity Urine      1.003 - 1.035 1.006   Blood Urine      NEG:Negative Negative   pH Urine      5.0 - 7.0 pH 7.0   Protein Albumin Urine      NEG:Negative mg/dL Negative   Urobilinogen mg/dL      0.0 - 2.0 mg/dL 0.0   Nitrite Urine      NEG:Negative Negative   Leukocyte Esterase Urine      NEG:Negative Negative   Source       Midstream Urine   WBC  Urine      0 - 5 /HPF 0   RBC Urine      0 - 2 /HPF 0   Squamous Epithelial /HPF Urine      0 - 1 /HPF <1   Neutrophil Cytoplasmic Antibody      <1:10 titer <1:10   Neutrophil Cytoplasmic Antibody Pattern       The ANCA IFA is <1:10.  No further testing will be performed.   Protein Random Urine      g/L <0.05   Protein Total Urine g/gr Creatinine      0 - 0.2 g/g Cr Unable to calculate due to low value   Myeloperoxidase Antibody IgG      0.0 - 0.9 AI 4.7 (H)   Proteinase 3 Antibody IgG      0.0 - 0.9 AI <0.2   IGE      0 - 114 KIU/L 22   CRP Inflammation      0.0 - 8.0 mg/L <2.9   Sed Rate      0 - 15 mm/h 5   Creatinine Urine      mg/dL 24     Stable labs in 2/2021    Ph.E:    Constitutional: WD/WN. Pleasant. In no acute distress.  Eyes: EOM intact, sclera anicteric, conj not injected. No saddle nose deformity  MS: No joint swelling.   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 Failed methotrexate at 6 tab qwk (did not tolerate higher dose of 8 tab qwk), off prednisone. On AZA since 8/2019 (NL TPMT) but was put on hold in 11/2019 given recurrent sinusitis, antibiotic tx, finally required another sinus surgery/polypectomy for the flare on 12/10/2019 which was successful but the goal is to prevent recurrence of polyps and control disease. He was put back on AZA 50 mg bid since end of 3/2020, he had a transient elevated LFTs before resuming AZA which self-resolved without any intervention. Labs in 3/2020 showed improvement of MPO even before resuming AZA.    On AZA 50 mg bid since end of 3/2020. Tolerates it well. Reports no recurrence of nasal polyps. He will do follow up with ENT. Labs from 2/2021 are stable. His EGPA seems to be stable. Reports seasonal allergies at this time of the year, using inhalor. Nasal congestion got better by missing AZA x 3 days.    We discussed covid booster vaccine, no official recommendation for J&J receivers  yet but most likely will need a booster being immunocompromised.      Today's plan:    Continue imuran 50 mg twice a day but ok to hold it x 5 days in case of viral URI to see if nasal congestion improves.    Consider Nucala if imuran stops working and/or if f/u ENT visit shows recurrence of nasal polyps    Labs every 3 months, overdue, ordered to be done now.    Return in 6 months (video visit)        Orders Placed This Encounter   Procedures     AST     ALT     Myeloperoxidase and Proteinase 3 Panel     Albumin level     CBC with Platelets & Differential     Creatinine     CRP inflammation     UA with Microscopic reflex to Culture     Protein  random urine with Creat Ratio     Creatinine random urine     Erythrocyte sedimentation rate auto     CD19 B Cell Count     ANCA IgG by IFA with Reflex to Titer     Immunoglobulins A G and M           Isabella Manley MD

## 2021-09-11 ENCOUNTER — HEALTH MAINTENANCE LETTER (OUTPATIENT)
Age: 41
End: 2021-09-11

## 2021-11-16 ENCOUNTER — LAB (OUTPATIENT)
Dept: LAB | Facility: CLINIC | Age: 41
End: 2021-11-16
Attending: INTERNAL MEDICINE
Payer: COMMERCIAL

## 2021-11-16 DIAGNOSIS — M30.1 EOSINOPHILIC GRANULOMATOSIS WITH POLYANGIITIS (EGPA) (H): ICD-10-CM

## 2021-11-16 DIAGNOSIS — Z51.81 MEDICATION MONITORING ENCOUNTER: ICD-10-CM

## 2021-11-16 DIAGNOSIS — D72.18 EOSINOPHILIC GRANULOMATOSIS WITH POLYANGIITIS (EGPA) (H): ICD-10-CM

## 2021-11-16 LAB
ALBUMIN SERPL-MCNC: 3.8 G/DL (ref 3.4–5)
ALBUMIN UR-MCNC: NEGATIVE MG/DL
ALT SERPL W P-5'-P-CCNC: 46 U/L (ref 0–70)
APPEARANCE UR: CLEAR
AST SERPL W P-5'-P-CCNC: 34 U/L (ref 0–45)
BASOPHILS # BLD AUTO: 0.1 10E3/UL (ref 0–0.2)
BASOPHILS NFR BLD AUTO: 1 %
BILIRUB UR QL STRIP: NEGATIVE
CD19 CELLS # BLD: 92 CELLS/UL (ref 107–698)
CD19 CELLS NFR BLD: 7 % (ref 6–27)
COLOR UR AUTO: YELLOW
CREAT SERPL-MCNC: 0.92 MG/DL (ref 0.66–1.25)
CREAT UR-MCNC: 37 MG/DL
CRP SERPL-MCNC: <2.9 MG/L (ref 0–8)
EOSINOPHIL # BLD AUTO: 0.4 10E3/UL (ref 0–0.7)
EOSINOPHIL NFR BLD AUTO: 9 %
ERYTHROCYTE [DISTWIDTH] IN BLOOD BY AUTOMATED COUNT: 11.9 % (ref 10–15)
ERYTHROCYTE [SEDIMENTATION RATE] IN BLOOD BY WESTERGREN METHOD: 10 MM/HR (ref 0–15)
GFR SERPL CREATININE-BSD FRML MDRD: >90 ML/MIN/1.73M2
GLUCOSE UR STRIP-MCNC: NEGATIVE MG/DL
HCT VFR BLD AUTO: 44.1 % (ref 40–53)
HGB BLD-MCNC: 15.1 G/DL (ref 13.3–17.7)
HGB UR QL STRIP: NEGATIVE
IMM GRANULOCYTES # BLD: 0 10E3/UL
IMM GRANULOCYTES NFR BLD: 0 %
KETONES UR STRIP-MCNC: NEGATIVE MG/DL
LEUKOCYTE ESTERASE UR QL STRIP: NEGATIVE
LYMPHOCYTES # BLD AUTO: 1.2 10E3/UL (ref 0.8–5.3)
LYMPHOCYTES NFR BLD AUTO: 26 %
MCH RBC QN AUTO: 33.4 PG (ref 26.5–33)
MCHC RBC AUTO-ENTMCNC: 34.2 G/DL (ref 31.5–36.5)
MCV RBC AUTO: 98 FL (ref 78–100)
MONOCYTES # BLD AUTO: 0.5 10E3/UL (ref 0–1.3)
MONOCYTES NFR BLD AUTO: 11 %
NEUTROPHILS # BLD AUTO: 2.4 10E3/UL (ref 1.6–8.3)
NEUTROPHILS NFR BLD AUTO: 53 %
NITRATE UR QL: NEGATIVE
NRBC # BLD AUTO: 0 10E3/UL
NRBC BLD AUTO-RTO: 0 /100
PH UR STRIP: 6 [PH] (ref 5–7)
PLATELET # BLD AUTO: 255 10E3/UL (ref 150–450)
PROT UR-MCNC: <0.05 G/L
PROT/CREAT 24H UR: NORMAL MG/G{CREAT}
RBC # BLD AUTO: 4.52 10E6/UL (ref 4.4–5.9)
RBC URINE: <1 /HPF
SP GR UR STRIP: 1.01 (ref 1–1.03)
UROBILINOGEN UR STRIP-MCNC: NORMAL MG/DL
WBC # BLD AUTO: 4.7 10E3/UL (ref 4–11)
WBC URINE: 0 /HPF

## 2021-11-16 PROCEDURE — 86140 C-REACTIVE PROTEIN: CPT | Performed by: PATHOLOGY

## 2021-11-16 PROCEDURE — 84156 ASSAY OF PROTEIN URINE: CPT | Performed by: PATHOLOGY

## 2021-11-16 PROCEDURE — 82040 ASSAY OF SERUM ALBUMIN: CPT | Performed by: PATHOLOGY

## 2021-11-16 PROCEDURE — 82784 ASSAY IGA/IGD/IGG/IGM EACH: CPT | Mod: 90 | Performed by: PATHOLOGY

## 2021-11-16 PROCEDURE — 84460 ALANINE AMINO (ALT) (SGPT): CPT | Performed by: PATHOLOGY

## 2021-11-16 PROCEDURE — 85025 COMPLETE CBC W/AUTO DIFF WBC: CPT | Performed by: PATHOLOGY

## 2021-11-16 PROCEDURE — 85652 RBC SED RATE AUTOMATED: CPT | Performed by: PATHOLOGY

## 2021-11-16 PROCEDURE — 86355 B CELLS TOTAL COUNT: CPT | Mod: 90 | Performed by: PATHOLOGY

## 2021-11-16 PROCEDURE — 99000 SPECIMEN HANDLING OFFICE-LAB: CPT | Performed by: PATHOLOGY

## 2021-11-16 PROCEDURE — 36415 COLL VENOUS BLD VENIPUNCTURE: CPT | Performed by: PATHOLOGY

## 2021-11-16 PROCEDURE — 81001 URINALYSIS AUTO W/SCOPE: CPT | Performed by: PATHOLOGY

## 2021-11-16 PROCEDURE — 83876 ASSAY MYELOPEROXIDASE: CPT | Mod: 90 | Performed by: PATHOLOGY

## 2021-11-16 PROCEDURE — 84450 TRANSFERASE (AST) (SGOT): CPT | Performed by: PATHOLOGY

## 2021-11-16 PROCEDURE — 82565 ASSAY OF CREATININE: CPT | Performed by: PATHOLOGY

## 2021-11-16 PROCEDURE — 83516 IMMUNOASSAY NONANTIBODY: CPT | Mod: 90 | Performed by: PATHOLOGY

## 2021-11-16 PROCEDURE — 86256 FLUORESCENT ANTIBODY TITER: CPT | Mod: 90 | Performed by: PATHOLOGY

## 2021-11-16 PROCEDURE — 86255 FLUORESCENT ANTIBODY SCREEN: CPT | Mod: 90 | Performed by: PATHOLOGY

## 2021-11-17 LAB
IGA SERPL-MCNC: 178 MG/DL (ref 84–499)
IGG SERPL-MCNC: 1053 MG/DL (ref 610–1616)
IGM SERPL-MCNC: 28 MG/DL (ref 35–242)

## 2021-11-18 LAB
ANCA AB PATTERN SER IF-IMP: NORMAL
C-ANCA TITR SER IF: NORMAL {TITER}

## 2021-11-19 LAB
MYELOPEROXIDASE AB SER IA-ACNC: 17 U/ML
MYELOPEROXIDASE AB SER IA-ACNC: POSITIVE
PROTEINASE3 AB SER IA-ACNC: <1 U/ML
PROTEINASE3 AB SER IA-ACNC: NEGATIVE

## 2022-01-01 ENCOUNTER — HEALTH MAINTENANCE LETTER (OUTPATIENT)
Age: 42
End: 2022-01-01

## 2022-02-01 ENCOUNTER — LAB (OUTPATIENT)
Dept: LAB | Facility: CLINIC | Age: 42
End: 2022-02-01
Payer: COMMERCIAL

## 2022-02-01 DIAGNOSIS — M30.1 EOSINOPHILIC GRANULOMATOSIS WITH POLYANGIITIS (EGPA) (H): ICD-10-CM

## 2022-02-01 DIAGNOSIS — D72.18 EOSINOPHILIC GRANULOMATOSIS WITH POLYANGIITIS (EGPA) (H): ICD-10-CM

## 2022-02-01 DIAGNOSIS — Z51.81 MEDICATION MONITORING ENCOUNTER: ICD-10-CM

## 2022-02-01 LAB
ALBUMIN SERPL-MCNC: 3.7 G/DL (ref 3.4–5)
ALBUMIN UR-MCNC: NEGATIVE MG/DL
ALT SERPL W P-5'-P-CCNC: 40 U/L (ref 0–70)
APPEARANCE UR: CLEAR
AST SERPL W P-5'-P-CCNC: 38 U/L (ref 0–45)
BASOPHILS # BLD AUTO: 0.1 10E3/UL (ref 0–0.2)
BASOPHILS NFR BLD AUTO: 2 %
BILIRUB UR QL STRIP: NEGATIVE
CD19 CELLS # BLD: 116 CELLS/UL (ref 107–698)
CD19 CELLS NFR BLD: 7 % (ref 6–27)
COLOR UR AUTO: YELLOW
CREAT SERPL-MCNC: 1.06 MG/DL (ref 0.66–1.25)
CREAT UR-MCNC: 24 MG/DL
CRP SERPL-MCNC: 7.4 MG/L (ref 0–8)
EOSINOPHIL # BLD AUTO: 0.5 10E3/UL (ref 0–0.7)
EOSINOPHIL NFR BLD AUTO: 9 %
ERYTHROCYTE [DISTWIDTH] IN BLOOD BY AUTOMATED COUNT: 12.2 % (ref 10–15)
ERYTHROCYTE [SEDIMENTATION RATE] IN BLOOD BY WESTERGREN METHOD: 13 MM/HR (ref 0–15)
GFR SERPL CREATININE-BSD FRML MDRD: 89 ML/MIN/1.73M2
GLUCOSE UR STRIP-MCNC: NEGATIVE MG/DL
HCT VFR BLD AUTO: 42.8 % (ref 40–53)
HGB BLD-MCNC: 14.9 G/DL (ref 13.3–17.7)
HGB UR QL STRIP: NEGATIVE
IMM GRANULOCYTES # BLD: 0 10E3/UL
IMM GRANULOCYTES NFR BLD: 0 %
KETONES UR STRIP-MCNC: NEGATIVE MG/DL
LEUKOCYTE ESTERASE UR QL STRIP: NEGATIVE
LYMPHOCYTES # BLD AUTO: 1.3 10E3/UL (ref 0.8–5.3)
LYMPHOCYTES NFR BLD AUTO: 25 %
MCH RBC QN AUTO: 33.9 PG (ref 26.5–33)
MCHC RBC AUTO-ENTMCNC: 34.8 G/DL (ref 31.5–36.5)
MCV RBC AUTO: 98 FL (ref 78–100)
MONOCYTES # BLD AUTO: 0.6 10E3/UL (ref 0–1.3)
MONOCYTES NFR BLD AUTO: 10 %
NEUTROPHILS # BLD AUTO: 3 10E3/UL (ref 1.6–8.3)
NEUTROPHILS NFR BLD AUTO: 54 %
NITRATE UR QL: NEGATIVE
NRBC # BLD AUTO: 0 10E3/UL
NRBC BLD AUTO-RTO: 0 /100
PH UR STRIP: 6 [PH] (ref 5–7)
PLATELET # BLD AUTO: 257 10E3/UL (ref 150–450)
PROT UR-MCNC: <0.05 G/L
PROT/CREAT 24H UR: NORMAL MG/G{CREAT}
RBC # BLD AUTO: 4.39 10E6/UL (ref 4.4–5.9)
RBC URINE: <1 /HPF
SP GR UR STRIP: 1 (ref 1–1.03)
UROBILINOGEN UR STRIP-MCNC: NORMAL MG/DL
WBC # BLD AUTO: 5.5 10E3/UL (ref 4–11)
WBC URINE: <1 /HPF

## 2022-02-01 PROCEDURE — 85652 RBC SED RATE AUTOMATED: CPT | Performed by: PATHOLOGY

## 2022-02-01 PROCEDURE — 85025 COMPLETE CBC W/AUTO DIFF WBC: CPT | Performed by: PATHOLOGY

## 2022-02-01 PROCEDURE — 84156 ASSAY OF PROTEIN URINE: CPT | Mod: 90 | Performed by: PATHOLOGY

## 2022-02-01 PROCEDURE — 86036 ANCA SCREEN EACH ANTIBODY: CPT | Mod: 90 | Performed by: PATHOLOGY

## 2022-02-01 PROCEDURE — 83876 ASSAY MYELOPEROXIDASE: CPT | Mod: 90 | Performed by: PATHOLOGY

## 2022-02-01 PROCEDURE — 36415 COLL VENOUS BLD VENIPUNCTURE: CPT | Performed by: PATHOLOGY

## 2022-02-01 PROCEDURE — 81001 URINALYSIS AUTO W/SCOPE: CPT | Performed by: PATHOLOGY

## 2022-02-01 PROCEDURE — 86140 C-REACTIVE PROTEIN: CPT | Mod: 90 | Performed by: PATHOLOGY

## 2022-02-01 PROCEDURE — 86355 B CELLS TOTAL COUNT: CPT | Mod: 90 | Performed by: PATHOLOGY

## 2022-02-01 PROCEDURE — 86256 FLUORESCENT ANTIBODY TITER: CPT | Mod: 90 | Performed by: PATHOLOGY

## 2022-02-01 PROCEDURE — 99000 SPECIMEN HANDLING OFFICE-LAB: CPT | Performed by: PATHOLOGY

## 2022-02-01 PROCEDURE — 82784 ASSAY IGA/IGD/IGG/IGM EACH: CPT | Mod: 90 | Performed by: PATHOLOGY

## 2022-02-01 PROCEDURE — 82040 ASSAY OF SERUM ALBUMIN: CPT | Mod: 90 | Performed by: PATHOLOGY

## 2022-02-01 PROCEDURE — 84450 TRANSFERASE (AST) (SGOT): CPT | Mod: 90 | Performed by: PATHOLOGY

## 2022-02-01 PROCEDURE — 83516 IMMUNOASSAY NONANTIBODY: CPT | Mod: 90 | Performed by: PATHOLOGY

## 2022-02-01 PROCEDURE — 82565 ASSAY OF CREATININE: CPT | Mod: 90 | Performed by: PATHOLOGY

## 2022-02-01 PROCEDURE — 84460 ALANINE AMINO (ALT) (SGPT): CPT | Mod: 90 | Performed by: PATHOLOGY

## 2022-02-02 LAB
ANCA AB PATTERN SER IF-IMP: ABNORMAL
C-ANCA TITR SER IF: ABNORMAL {TITER}
IGA SERPL-MCNC: 168 MG/DL (ref 84–499)
IGG SERPL-MCNC: 1146 MG/DL (ref 610–1616)
IGM SERPL-MCNC: 30 MG/DL (ref 35–242)

## 2022-02-04 ENCOUNTER — VIRTUAL VISIT (OUTPATIENT)
Dept: RHEUMATOLOGY | Facility: CLINIC | Age: 42
End: 2022-02-04
Attending: INTERNAL MEDICINE
Payer: COMMERCIAL

## 2022-02-04 DIAGNOSIS — D72.18 EOSINOPHILIC GRANULOMATOSIS WITH POLYANGIITIS (EGPA) (H): Primary | ICD-10-CM

## 2022-02-04 DIAGNOSIS — M30.1 EOSINOPHILIC GRANULOMATOSIS WITH POLYANGIITIS (EGPA) (H): Primary | ICD-10-CM

## 2022-02-04 DIAGNOSIS — Z51.81 MEDICATION MONITORING ENCOUNTER: ICD-10-CM

## 2022-02-04 LAB
MYELOPEROXIDASE AB SER IA-ACNC: 102 U/ML
MYELOPEROXIDASE AB SER IA-ACNC: POSITIVE
PROTEINASE3 AB SER IA-ACNC: <1 U/ML
PROTEINASE3 AB SER IA-ACNC: NEGATIVE

## 2022-02-04 PROCEDURE — 99214 OFFICE O/P EST MOD 30 MIN: CPT | Mod: 95 | Performed by: INTERNAL MEDICINE

## 2022-02-04 RX ORDER — PREDNISONE 10 MG/1
TABLET ORAL
Qty: 30 TABLET | Refills: 0 | Status: SHIPPED | OUTPATIENT
Start: 2022-02-04 | End: 2022-09-23

## 2022-02-04 NOTE — PATIENT INSTRUCTIONS
Prednisone 40-30-20-10 mg ad ay each for 3 days then stop    CXR if still symptoms    Nucala approval    Stay on imuran till we get nucala approved    Labs in 3 months    Return in 3-4 months (in person or video)

## 2022-02-04 NOTE — PROGRESS NOTES
Patient was called and message left with appointment time and call back number. Aysha Conklin on 2/4/2022 at 9:21 AM    Lucio is a 41 year old who is being evaluated via a billable video visit.      How would you like to obtain your AVS? Kirk  If the video visit is dropped, the invitation should be resent by: Text to cell phone: [phone]  Will anyone else be joining your video visit? No      Video Start Time: 10:01 AM  Video-Visit Details    Type of service:  Video Visit    Video End Time:10:34 AM    Originating Location (pt. 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    Last visit:    He is doing fairly well. No recurrence of nasal polyps on imuran. Last seen by ENT in 12/2020, has upcoming appointment. Reports having seasonal allergies at this time of the year, has been using his inhalers x past 2 weeks. Reports some nasal congestion, it was better when he ran out of AZA x 3 days early this week.    No joint pain or skin rash.    Had surgery for R shoulder rotator cuff full tear which was successful.    Had J&J covid vaccine on 4/8/2021, tolerated it well.      Today 2/4/2022:       Had 2 sinusitis, went on 2 rounds of antibiotics. Had 3 different bacteria on the culture. Off antibiotic x 2 weeks, resumed AZA x past 2 weeks. Still gets PND and coughs a lot. ENT saw inflammation on exam with no infection, this was about 2.5 wk ago when he was recommended to resume AZA. Was off AZA from 9/2021 till two weeks ago.  1st and 2nd round of antibiotic, did not come off AZA, just with 3rd round. Has some SOB, uses inhalers more. Gets winded more with  exercise.       PMHx: EGPA    Family History   Problem Relation Age of Onset     Colon Cancer Maternal Grandmother      Stomach Cancer Paternal Grandmother      No Known Problems Mother      No Known Problems Father      No Known Problems Maternal Grandfather      No Known Problems Paternal Grandfather      No Known Problems Brother      No Known Problems Sister      No Known Problems Son      No Known Problems Daughter      No Known Problems Maternal Half-Brother      No Known Problems Maternal Half-Sister      No Known Problems Paternal Half-Brother      No Known Problems Paternal Half-Sister      No Known Problems Niece      No Known Problems Nephew      No Known Problems Cousin      No Known Problems Other      Cancer No family hx of      Diabetes No family hx of      Heart Disease No family hx of      Hypertension No family hx of      Cerebrovascular Disease No family hx of      Asthma No family hx of      Thyroid Disease No family hx of      Depression No family hx of      Mental Illness No family hx of      Substance Abuse No family hx of      Dementia No family hx of      Bleeding Disorder No family hx of      Congenital Anomalies No family hx of      Migraines No family hx of      Stomach Problem No family hx of      Muscular Disorder No family hx of      Osteoporosis No family hx of      Unknown/Adopted No family hx of      Social History     Socioeconomic History     Marital status:      Spouse name: Not on file     Number of children: 2     Years of education: Not on file     Highest education level: Not on file   Occupational History     Occupation:  manager   Tobacco Use     Smoking status: Never Smoker     Smokeless tobacco: Never Used   Substance and Sexual Activity     Alcohol use: Yes     Drug use: Never     Sexual activity: Not on file   Other Topics Concern     Not on file   Social History Narrative     Not on file     Social Determinants of Health     Financial Resource Strain:  Not on file   Food Insecurity: Not on file   Transportation Needs: Not on file   Physical Activity: Not on file   Stress: Not on file   Social Connections: Not on file   Intimate Partner Violence: Not on file   Housing Stability: Not on file       No Known Allergies    Outpatient Encounter Medications as of 2/4/2022   Medication Sig Dispense Refill     albuterol (PROAIR HFA/PROVENTIL HFA/VENTOLIN HFA) 108 (90 Base) MCG/ACT inhaler as needed        azaTHIOprine (IMURAN) 50 MG tablet TAKE 1 TABLET BY MOUTH TWICE DAILY 180 tablet 0     budesonide (PULMICORT) 1 MG/2ML neb solution Take 1 mg by nebulization daily        cetirizine (ZYRTEC ALLERGY) 10 MG tablet Take 10 mg by mouth daily        clindamycin phosphate (EVOCLIN) 1 % external foam daily        diclofenac (VOLTAREN) 1 % topical gel Apply 2 g topically 4 times daily as needed for moderate pain To use over R shoulder 100 g 1     FLOVENT  MCG/ACT inhaler Inhale 1 puff into the lungs daily        montelukast (SINGULAIR) 10 MG tablet Take 10 mg by mouth At Bedtime        SYMBICORT 160-4.5 MCG/ACT Inhaler Inhale 2 puffs into the lungs 2 times daily        tretinoin (RETIN-A) 0.1 % external cream Apply topically as needed        No facility-administered encounter medications on file as of 2/4/2022.           ROS:  A comprehensive ROS was done, positives are per HPI.        His records were reviewed.    4/2019: pos MPO, p-ANCA    7/2019: NL ESR/CRP    Component      Latest Ref Rng & Units 3/25/2020   WBC      4.0 - 11.0 10e9/L 3.8 (L)   RBC Count      4.4 - 5.9 10e12/L 4.53   Hemoglobin      13.3 - 17.7 g/dL 15.1   Hematocrit      40.0 - 53.0 % 45.3   MCV      78 - 100 fl 100   MCH      26.5 - 33.0 pg 33.3 (H)   MCHC      31.5 - 36.5 g/dL 33.3   RDW      10.0 - 15.0 % 13.0   Platelet Count      150 - 450 10e9/L 235   Diff Method       Automated Method   % Neutrophils      % 43.3   % Lymphocytes      % 41.0   % Monocytes      % 9.0   % Eosinophils      % 4.8   %  Basophils      % 1.6   % Immature Granulocytes      % 0.3   Nucleated RBCs      0 /100 0   Absolute Neutrophil      1.6 - 8.3 10e9/L 1.6   Absolute Lymphocytes      0.8 - 5.3 10e9/L 1.5   Absolute Monocytes      0.0 - 1.3 10e9/L 0.3   Absolute Eosinophils      0.0 - 0.7 10e9/L 0.2   Absolute Basophils      0.0 - 0.2 10e9/L 0.1   Abs Immature Granulocytes      0 - 0.4 10e9/L 0.0   Absolute Nucleated RBC       0.0   Sodium      133 - 144 mmol/L 140   Potassium      3.4 - 5.3 mmol/L 3.6   Chloride      94 - 109 mmol/L 106   Carbon Dioxide      20 - 32 mmol/L 32   Anion Gap      3 - 14 mmol/L 2 (L)   Glucose      70 - 99 mg/dL 53 (L)   Urea Nitrogen      7 - 30 mg/dL 22   Creatinine      0.66 - 1.25 mg/dL 0.98   GFR Estimate      >60 mL/min/1.73:m2 >90   GFR Estimate If Black      >60 mL/min/1.73:m2 >90   Calcium      8.5 - 10.1 mg/dL 9.1   Bilirubin Total      0.2 - 1.3 mg/dL 0.6   Albumin      3.4 - 5.0 g/dL 4.4   Protein Total      6.8 - 8.8 g/dL 7.5   Alkaline Phosphatase      40 - 150 U/L 52   ALT      0 - 70 U/L 45   AST      0 - 45 U/L 28   Color Urine       Yellow   Appearance Urine       Clear   Glucose Urine      NEG:Negative mg/dL Negative   Bilirubin Urine      NEG:Negative Negative   Ketones Urine      NEG:Negative mg/dL Negative   Specific Gravity Urine      1.003 - 1.035 1.006   Blood Urine      NEG:Negative Negative   pH Urine      5.0 - 7.0 pH 7.0   Protein Albumin Urine      NEG:Negative mg/dL Negative   Urobilinogen mg/dL      0.0 - 2.0 mg/dL 0.0   Nitrite Urine      NEG:Negative Negative   Leukocyte Esterase Urine      NEG:Negative Negative   Source       Midstream Urine   WBC Urine      0 - 5 /HPF 0   RBC Urine      0 - 2 /HPF 0   Squamous Epithelial /HPF Urine      0 - 1 /HPF <1   Neutrophil Cytoplasmic Antibody      <1:10 titer <1:10   Neutrophil Cytoplasmic Antibody Pattern       The ANCA IFA is <1:10.  No further testing will be performed.   Protein Random Urine      g/L <0.05   Protein Total  Urine g/gr Creatinine      0 - 0.2 g/g Cr Unable to calculate due to low value   Myeloperoxidase Antibody IgG      0.0 - 0.9 AI 4.7 (H)   Proteinase 3 Antibody IgG      0.0 - 0.9 AI <0.2   IGE      0 - 114 KIU/L 22   CRP Inflammation      0.0 - 8.0 mg/L <2.9   Sed Rate      0 - 15 mm/h 5   Creatinine Urine      mg/dL 24     Stable labs in 2/2021    Component      Latest Ref Rng & Units 11/16/2021   Color Urine      Colorless, Straw, Light Yellow, Yellow Yellow   Appearance Urine      Clear Clear   Glucose Urine      Negative mg/dL Negative   Bilirubin Urine      Negative Negative   Ketones Urine      Negative mg/dL Negative   Specific Gravity Urine      1.003 - 1.035 1.009   Blood Urine      Negative Negative   pH Urine      5.0 - 7.0 6.0   Protein Albumin Urine      Negative mg/dL Negative   Urobilinogen mg/dL      Normal, 2.0 mg/dL Normal   Nitrite Urine      Negative Negative   Leukocyte Esterase Urine      Negative Negative   RBC Urine      <=2 /HPF <1   WBC Urine      <=5 /HPF 0   MPO Zenaida IgG Instrument Value      <3.5 U/mL 17.0 (H)   Myeloperoxidase Antibody IgG      Negative Positive (A)   Proteinase 3 Zenaida IgG Instrument Value      <2.0 U/mL <1.0   Proteinase 3 Antibody IgG      Negative Negative   Protein Random Urine      g/L <0.05   Protein Total Urine g/gr Creatinine          Creatinine Urine      mg/dL 37   IGG      610-1,616 mg/dL 1,053   IGA      84 - 499 mg/dL 178   IGM      35 - 242 mg/dL 28 (L)   Creatinine      0.66 - 1.25 mg/dL 0.92   GFR Estimate      >60 mL/min/1.73m2 >90   CD19 B Cells      6 - 27 % 7   Absolute CD19      107 - 698 cells/uL 92 (L)   Neutrophil Cytoplasmic Antibody      <1:10 <1:10   Neutrophil Cytoplasmic Antibody Pattern       The ANCA IFA is <1:10.  No further testing will be performed.   AST      0 - 45 U/L 34   ALT      0 - 70 U/L 46   Albumin      3.4 - 5.0 g/dL 3.8   CRP Inflammation      0.0 - 8.0 mg/L <2.9   Sed Rate      0 - 15 mm/hr 10     Component      Latest Ref  Rng & Units 2/1/2022   Color Urine      Colorless, Straw, Light Yellow, Yellow Yellow   Appearance Urine      Clear Clear   Glucose Urine      Negative mg/dL Negative   Bilirubin Urine      Negative Negative   Ketones Urine      Negative mg/dL Negative   Specific Gravity Urine      1.003 - 1.035 1.004   Blood Urine      Negative Negative   pH Urine      5.0 - 7.0 6.0   Protein Albumin Urine      Negative mg/dL Negative   Urobilinogen mg/dL      Normal, 2.0 mg/dL Normal   Nitrite Urine      Negative Negative   Leukocyte Esterase Urine      Negative Negative   RBC Urine      <=2 /HPF <1   WBC Urine      <=5 /HPF <1   MPO Zenaida IgG Instrument Value      <3.5 U/mL    Myeloperoxidase Antibody IgG      Negative    Proteinase 3 Zenaida IgG Instrument Value      <2.0 U/mL    Proteinase 3 Antibody IgG      Negative    Protein Random Urine      g/L <0.05   Protein Total Urine g/gr Creatinine          Creatinine Urine      mg/dL 24   IGG      610-1,616 mg/dL 1,146   IGA      84 - 499 mg/dL 168   IGM      35 - 242 mg/dL 30 (L)   Creatinine      0.66 - 1.25 mg/dL 1.06   GFR Estimate      >60 mL/min/1.73m2 89   CD19 B Cells      6 - 27 % 7   Absolute CD19      107 - 698 cells/uL 116   Neutrophil Cytoplasmic Antibody      <1:10 1:160 (H)   Neutrophil Cytoplasmic Antibody Pattern       Perinuclear ANCA (p-ANCA) staining pattern observed and confirmed on formalin-fixed neutrophils.   AST      0 - 45 U/L 38   ALT      0 - 70 U/L 40   Albumin      3.4 - 5.0 g/dL 3.7   CRP Inflammation      0.0 - 8.0 mg/L 7.4   Sed Rate      0 - 15 mm/hr 13     Component      Latest Ref Rng & Units 2/1/2022   WBC      4.0 - 11.0 10e3/uL 5.5   RBC Count      4.40 - 5.90 10e6/uL 4.39 (L)   Hemoglobin      13.3 - 17.7 g/dL 14.9   Hematocrit      40.0 - 53.0 % 42.8   MCV      78 - 100 fL 98   MCH      26.5 - 33.0 pg 33.9 (H)   MCHC      31.5 - 36.5 g/dL 34.8   RDW      10.0 - 15.0 % 12.2   Platelet Count      150 - 450 10e3/uL 257   % Neutrophils      % 54   %  Lymphocytes      % 25   % Monocytes      % 10   % Eosinophils      % 9   % Basophils      % 2   % Immature Granulocytes      % 0   NRBCs per 100 WBC      <1 /100 0   Absolute Neutrophils      1.6 - 8.3 10e3/uL 3.0   Absolute Lymphocytes      0.8 - 5.3 10e3/uL 1.3   Absolute Monocytes      0.0 - 1.3 10e3/uL 0.6   Absolute Eosinophils      0.0 - 0.7 10e3/uL 0.5   Absolute Basophils      0.0 - 0.2 10e3/uL 0.1   Absolute Immature Granulocytes      <=0.4 10e3/uL 0.0   Absolute NRBCs      10e3/uL 0.0   Color Urine      Colorless, Straw, Light Yellow, Yellow Yellow   Appearance Urine      Clear Clear   Glucose Urine      Negative mg/dL Negative   Bilirubin Urine      Negative Negative   Ketones Urine      Negative mg/dL Negative   Specific Gravity Urine      1.003 - 1.035 1.004   Blood Urine      Negative Negative   pH Urine      5.0 - 7.0 6.0   Protein Albumin Urine      Negative mg/dL Negative   Urobilinogen mg/dL      Normal, 2.0 mg/dL Normal   Nitrite Urine      Negative Negative   Leukocyte Esterase Urine      Negative Negative   RBC Urine      <=2 /HPF <1   WBC Urine      <=5 /HPF <1   MPO Zenaida IgG Instrument Value      <3.5 U/mL 102.0 (H)   Myeloperoxidase Antibody IgG      Negative Positive (A)   Proteinase 3 Zenaida IgG Instrument Value      <2.0 U/mL <1.0   Proteinase 3 Antibody IgG      Negative Negative   IGG      610-1,616 mg/dL 1,146   IGA      84 - 499 mg/dL 168   IGM      35 - 242 mg/dL 30 (L)   Protein Random Urine      g/L <0.05   Protein Total Urine g/gr Creatinine          Creatinine Urine      mg/dL 24   Neutrophil Cytoplasmic Antibody      <1:10 1:160 (H)   Neutrophil Cytoplasmic Antibody Pattern       Perinuclear ANCA (p-ANCA) staining pattern observed and confirmed on formalin-fixed neutrophils.   CD19 B Cells      6 - 27 % 7   Absolute CD19      107 - 698 cells/uL 116   Creatinine      0.66 - 1.25 mg/dL 1.06   GFR Estimate      >60 mL/min/1.73m2 89   Sed Rate      0 - 15 mm/hr 13   CRP Inflammation       0.0 - 8.0 mg/L 7.4   Albumin      3.4 - 5.0 g/dL 3.7   ALT      0 - 70 U/L 40   AST      0 - 45 U/L 38       Ph.E:    Constitutional: WD/WN. Pleasant. In no acute distress.  Eyes: EOM intact, sclera anicteric, conj not injected. No saddle nose deformity  MS: No joint swelling.   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 Failed methotrexate at 6 tab qwk (did not tolerate higher dose of 8 tab qwk), off prednisone. On AZA since 8/2019 (NL TPMT) but was put on hold in 11/2019 given recurrent sinusitis, antibiotic tx, finally required another sinus surgery/polypectomy for the flare on 12/10/2019 which was successful but the goal is to prevent recurrence of polyps and control disease. He was put back on AZA 50 mg bid since end of 3/2020, he had a transient elevated LFTs before resuming AZA which self-resolved without any intervention. Labs in 3/2020 showed improvement of MPO even before resuming AZA.    8/20/2021: On AZA 50 mg bid since end of 3/2020. Tolerates it well. Reports no recurrence of nasal polyps. He will do follow up with ENT. Labs from 2/2021 are stable. His EGPA seems to be stable. Reports seasonal allergies at this time of the year, using inhalor. Nasal congestion got better by missing AZA x 3 days.    We discussed covid booster vaccine, no official recommendation for J&J receivers yet but most likely will need a booster being immunocompromised.    Today 2/4/2022: Flaring with EGPA, sinus inflammation, increased vasculitis markers. On and off AZA due to sinus infections. Recommend to switch to nucala to control EGPA; risks were discussed. Will treat with prednisone taper.        Today's plan:    Prednisone 40-30-20-10 mg ad ay each for 3 days then stop    CXR if still symptoms    Nucala approval    Stay on imuran till we get nucala approved    Labs in 3 months    Return in 3-4 months (in person or video)        Orders Placed This  Encounter   Procedures     X-ray Chest 2 views     AST     ALT     Myeloperoxidase and Proteinase 3 Panel     Albumin level     Creatinine     CRP inflammation     UA with Microscopic reflex to Culture     Protein  random urine     Creatinine random urine     Erythrocyte sedimentation rate auto     CD19 B Cell Count     ANCA IgG by IFA with Reflex to Titer     Immunoglobulins A G and M     CBC with Platelets & Differential           Isabella Manley MD

## 2022-02-04 NOTE — LETTER
Date:February 22, 2022      Provider requested that no letter be sent. Do not send.       [facility]

## 2022-02-10 ENCOUNTER — MYC MEDICAL ADVICE (OUTPATIENT)
Dept: RHEUMATOLOGY | Facility: CLINIC | Age: 42
End: 2022-02-10
Payer: COMMERCIAL

## 2022-02-10 DIAGNOSIS — D72.18 EOSINOPHILIC GRANULOMATOSIS WITH POLYANGIITIS (EGPA) (H): Primary | ICD-10-CM

## 2022-02-10 DIAGNOSIS — M30.1 EOSINOPHILIC GRANULOMATOSIS WITH POLYANGIITIS (EGPA) (H): Primary | ICD-10-CM

## 2022-02-11 DIAGNOSIS — D72.18 EOSINOPHILIC GRANULOMATOSIS WITH POLYANGIITIS (EGPA) (H): Primary | ICD-10-CM

## 2022-02-11 DIAGNOSIS — R05.9 COUGH: ICD-10-CM

## 2022-02-11 DIAGNOSIS — M30.1 EOSINOPHILIC GRANULOMATOSIS WITH POLYANGIITIS (EGPA) (H): Primary | ICD-10-CM

## 2022-02-15 RX ORDER — PREDNISONE 20 MG/1
60 TABLET ORAL DAILY
Qty: 15 TABLET | Refills: 0 | Status: SHIPPED | OUTPATIENT
Start: 2022-02-15 | End: 2022-02-18

## 2022-02-18 RX ORDER — PREDNISONE 20 MG/1
TABLET ORAL
Qty: 21 TABLET | Refills: 1 | Status: SHIPPED | OUTPATIENT
Start: 2022-02-18 | End: 2022-09-23

## 2022-02-24 ENCOUNTER — MYC MEDICAL ADVICE (OUTPATIENT)
Dept: RHEUMATOLOGY | Facility: CLINIC | Age: 42
End: 2022-02-24
Payer: COMMERCIAL

## 2022-03-27 DIAGNOSIS — D72.18 EOSINOPHILIC GRANULOMATOSIS WITH POLYANGIITIS (EGPA) (H): ICD-10-CM

## 2022-03-27 DIAGNOSIS — M30.1 EOSINOPHILIC GRANULOMATOSIS WITH POLYANGIITIS (EGPA) (H): ICD-10-CM

## 2022-03-28 RX ORDER — PREDNISONE 20 MG/1
TABLET ORAL
Qty: 20 TABLET | Refills: 1 | OUTPATIENT
Start: 2022-03-28

## 2022-05-17 ENCOUNTER — LAB (OUTPATIENT)
Dept: LAB | Facility: CLINIC | Age: 42
End: 2022-05-17
Attending: INTERNAL MEDICINE
Payer: COMMERCIAL

## 2022-05-17 DIAGNOSIS — M30.1 EOSINOPHILIC GRANULOMATOSIS WITH POLYANGIITIS (EGPA) (H): ICD-10-CM

## 2022-05-17 DIAGNOSIS — Z51.81 MEDICATION MONITORING ENCOUNTER: ICD-10-CM

## 2022-05-17 DIAGNOSIS — D72.18 EOSINOPHILIC GRANULOMATOSIS WITH POLYANGIITIS (EGPA) (H): ICD-10-CM

## 2022-05-17 LAB
ALBUMIN SERPL-MCNC: 3.8 G/DL (ref 3.4–5)
ALBUMIN UR-MCNC: NEGATIVE MG/DL
ALT SERPL W P-5'-P-CCNC: 46 U/L (ref 0–70)
APPEARANCE UR: CLEAR
AST SERPL W P-5'-P-CCNC: 37 U/L (ref 0–45)
BASOPHILS # BLD AUTO: 0.1 10E3/UL (ref 0–0.2)
BASOPHILS NFR BLD AUTO: 1 %
BILIRUB UR QL STRIP: NEGATIVE
CD19 CELLS # BLD: 156 CELLS/UL (ref 107–698)
CD19 CELLS NFR BLD: 10 % (ref 6–27)
COLOR UR AUTO: NORMAL
CREAT SERPL-MCNC: 0.99 MG/DL (ref 0.66–1.25)
CREAT UR-MCNC: 24 MG/DL
CRP SERPL-MCNC: <2.9 MG/L (ref 0–8)
EOSINOPHIL # BLD AUTO: 0 10E3/UL (ref 0–0.7)
EOSINOPHIL NFR BLD AUTO: 0 %
ERYTHROCYTE [DISTWIDTH] IN BLOOD BY AUTOMATED COUNT: 11.9 % (ref 10–15)
ERYTHROCYTE [SEDIMENTATION RATE] IN BLOOD BY WESTERGREN METHOD: 7 MM/HR (ref 0–15)
GFR SERPL CREATININE-BSD FRML MDRD: >90 ML/MIN/1.73M2
GLUCOSE UR STRIP-MCNC: NEGATIVE MG/DL
HCT VFR BLD AUTO: 43.3 % (ref 40–53)
HGB BLD-MCNC: 15.1 G/DL (ref 13.3–17.7)
HGB UR QL STRIP: NEGATIVE
IMM GRANULOCYTES # BLD: 0 10E3/UL
IMM GRANULOCYTES NFR BLD: 0 %
KETONES UR STRIP-MCNC: NEGATIVE MG/DL
LEUKOCYTE ESTERASE UR QL STRIP: NEGATIVE
LYMPHOCYTES # BLD AUTO: 1.5 10E3/UL (ref 0.8–5.3)
LYMPHOCYTES NFR BLD AUTO: 31 %
MCH RBC QN AUTO: 33.2 PG (ref 26.5–33)
MCHC RBC AUTO-ENTMCNC: 34.9 G/DL (ref 31.5–36.5)
MCV RBC AUTO: 95 FL (ref 78–100)
MONOCYTES # BLD AUTO: 0.5 10E3/UL (ref 0–1.3)
MONOCYTES NFR BLD AUTO: 11 %
NEUTROPHILS # BLD AUTO: 2.6 10E3/UL (ref 1.6–8.3)
NEUTROPHILS NFR BLD AUTO: 57 %
NITRATE UR QL: NEGATIVE
NRBC # BLD AUTO: 0 10E3/UL
NRBC BLD AUTO-RTO: 0 /100
PH UR STRIP: 6 [PH] (ref 5–7)
PLATELET # BLD AUTO: 243 10E3/UL (ref 150–450)
PROT UR-MCNC: <0.05 G/L
PROT/CREAT 24H UR: NORMAL MG/G{CREAT}
RBC # BLD AUTO: 4.55 10E6/UL (ref 4.4–5.9)
RBC URINE: 0 /HPF
SP GR UR STRIP: 1.01 (ref 1–1.03)
UROBILINOGEN UR STRIP-MCNC: NORMAL MG/DL
WBC # BLD AUTO: 4.7 10E3/UL (ref 4–11)
WBC URINE: <1 /HPF

## 2022-05-17 PROCEDURE — 85025 COMPLETE CBC W/AUTO DIFF WBC: CPT | Performed by: PATHOLOGY

## 2022-05-17 PROCEDURE — 83516 IMMUNOASSAY NONANTIBODY: CPT | Mod: 90 | Performed by: PATHOLOGY

## 2022-05-17 PROCEDURE — 85652 RBC SED RATE AUTOMATED: CPT | Performed by: PATHOLOGY

## 2022-05-17 PROCEDURE — 86036 ANCA SCREEN EACH ANTIBODY: CPT | Mod: 90 | Performed by: PATHOLOGY

## 2022-05-17 PROCEDURE — 86355 B CELLS TOTAL COUNT: CPT | Mod: 90 | Performed by: PATHOLOGY

## 2022-05-17 PROCEDURE — 84450 TRANSFERASE (AST) (SGOT): CPT | Performed by: PATHOLOGY

## 2022-05-17 PROCEDURE — 83876 ASSAY MYELOPEROXIDASE: CPT | Mod: 90 | Performed by: PATHOLOGY

## 2022-05-17 PROCEDURE — 84156 ASSAY OF PROTEIN URINE: CPT | Performed by: PATHOLOGY

## 2022-05-17 PROCEDURE — 86140 C-REACTIVE PROTEIN: CPT | Performed by: PATHOLOGY

## 2022-05-17 PROCEDURE — 82784 ASSAY IGA/IGD/IGG/IGM EACH: CPT | Mod: 90 | Performed by: PATHOLOGY

## 2022-05-17 PROCEDURE — 82040 ASSAY OF SERUM ALBUMIN: CPT | Performed by: PATHOLOGY

## 2022-05-17 PROCEDURE — 84460 ALANINE AMINO (ALT) (SGPT): CPT | Performed by: PATHOLOGY

## 2022-05-17 PROCEDURE — 86256 FLUORESCENT ANTIBODY TITER: CPT | Mod: 90 | Performed by: PATHOLOGY

## 2022-05-17 PROCEDURE — 36415 COLL VENOUS BLD VENIPUNCTURE: CPT | Performed by: PATHOLOGY

## 2022-05-17 PROCEDURE — 99000 SPECIMEN HANDLING OFFICE-LAB: CPT | Performed by: PATHOLOGY

## 2022-05-17 PROCEDURE — 82565 ASSAY OF CREATININE: CPT | Performed by: PATHOLOGY

## 2022-05-17 PROCEDURE — 81001 URINALYSIS AUTO W/SCOPE: CPT | Performed by: PATHOLOGY

## 2022-05-18 LAB
ANCA AB PATTERN SER IF-IMP: NORMAL
C-ANCA TITR SER IF: NORMAL {TITER}
IGA SERPL-MCNC: 164 MG/DL (ref 84–499)
IGG SERPL-MCNC: 943 MG/DL (ref 610–1616)
IGM SERPL-MCNC: 30 MG/DL (ref 35–242)

## 2022-05-19 DIAGNOSIS — D72.18 EOSINOPHILIC GRANULOMATOSIS WITH POLYANGIITIS (EGPA) (H): ICD-10-CM

## 2022-05-19 DIAGNOSIS — M30.1 EOSINOPHILIC GRANULOMATOSIS WITH POLYANGIITIS (EGPA) (H): ICD-10-CM

## 2022-05-20 LAB
MYELOPEROXIDASE AB SER IA-ACNC: 8.2 U/ML
MYELOPEROXIDASE AB SER IA-ACNC: POSITIVE
PROTEINASE3 AB SER IA-ACNC: <1 U/ML
PROTEINASE3 AB SER IA-ACNC: NEGATIVE

## 2022-05-23 RX ORDER — MEPOLIZUMAB 100 MG/ML
INJECTION, SOLUTION SUBCUTANEOUS
Qty: 3 ML | Refills: 3 | Status: SHIPPED | OUTPATIENT
Start: 2022-05-23 | End: 2022-07-27

## 2022-05-23 NOTE — TELEPHONE ENCOUNTER
NUCALA 100MG/ML SOAJ      Last Written Prescription Date:  2-4-2022  Last Fill Quantity: 3 ml,   # refills: 3  Last Office Visit : 2-4-2022  Future Office visit:  8-    Routing refill request to provider for review/approval because:  Not on protocol.        Kathleen M Doege RN

## 2022-08-01 ENCOUNTER — TELEPHONE (OUTPATIENT)
Dept: RHEUMATOLOGY | Facility: CLINIC | Age: 42
End: 2022-08-01

## 2022-08-01 NOTE — TELEPHONE ENCOUNTER
PA Initiation    Medication: PA Pending [facility]  Insurance Company: [employer] [place] - Phone [phone] Fax [phone]  Pharmacy Filling the Rx:    Filling Pharmacy Phone:    Filling Pharmacy Fax:    Start Date: 8/1/2022    Key: [id number]

## 2022-08-03 NOTE — TELEPHONE ENCOUNTER
Prior Authorization Approval    Authorization Effective Date: 8/3/2022  Authorization Expiration Date: 8/3/2023  Medication: PA Approved Nucala  Approved Dose/Quantity: 3/28d  Reference #: Key: [id number]   Insurance Company: [employer] [place] - Phone [phone] Fax [phone]  Expected CoPay:       CoPay Card Available:      Foundation Assistance Needed:    Which Pharmacy is filling the prescription (Not needed for infusion/clinic administered): [facility] - [place], [place] - [address]  Pharmacy Notified: Yes  Patient Notified: Yes

## 2022-08-19 ENCOUNTER — TELEPHONE (OUTPATIENT)
Dept: RHEUMATOLOGY | Facility: CLINIC | Age: 42
End: 2022-08-19

## 2022-08-19 ENCOUNTER — VIRTUAL VISIT (OUTPATIENT)
Dept: RHEUMATOLOGY | Facility: CLINIC | Age: 42
End: 2022-08-19
Attending: INTERNAL MEDICINE
Payer: COMMERCIAL

## 2022-08-19 DIAGNOSIS — Z51.81 MEDICATION MONITORING ENCOUNTER: ICD-10-CM

## 2022-08-19 DIAGNOSIS — M30.1 EOSINOPHILIC GRANULOMATOSIS WITH POLYANGIITIS (EGPA) (H): Primary | ICD-10-CM

## 2022-08-19 DIAGNOSIS — D72.18 EOSINOPHILIC GRANULOMATOSIS WITH POLYANGIITIS (EGPA) (H): Primary | ICD-10-CM

## 2022-08-19 PROCEDURE — G0463 HOSPITAL OUTPT CLINIC VISIT: HCPCS | Mod: PN,RTG | Performed by: INTERNAL MEDICINE

## 2022-08-19 PROCEDURE — 99214 OFFICE O/P EST MOD 30 MIN: CPT | Mod: 95 | Performed by: INTERNAL MEDICINE

## 2022-08-19 RX ORDER — METHYLPREDNISOLONE 4 MG
TABLET, DOSE PACK ORAL
Qty: 21 TABLET | Refills: 3 | Status: SHIPPED | OUTPATIENT
Start: 2022-08-19 | End: 2022-09-23

## 2022-08-19 NOTE — PROGRESS NOTES
Mj Mason Jr.  is being evaluated via a billable video visit.      Patient denies any changes since echeck-in regarding medication and allergies and states all information entered during echeck-in remains accurate.    How would you like to obtain your AVS? [employer]  For the video visit, send the invitation by: Text to cell phone: [phone]  Will anyone else be joining your video visit? No    Video Start Time: 10:52 AM  Video-Visit Details    Type of service:  Video Visit    Video End Time:  11:05 AM    Originating Location (pt. Location): Home    Distant Location (provider location):  [facility] [place]     Platform used for Video Visit: [employer]        Rheumatology F/U Virtual Visit Note    Reason for visit: ANCA-vasculitis, EGPA      Date of initial visit: 8/8/2019    Last seen: 2/4/2022    DOS: 8/19/2022    HPI     Mj Mason Jr. 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    Last visit:    He is doing fairly well. No recurrence of nasal polyps on imuran. Last seen by ENT in 12/2020, has upcoming appointment. Reports having seasonal allergies at this time of the year, has been using his inhalers x past 2 weeks. Reports some nasal congestion, it was better when he ran out of AZA x 3 days early this week.    No joint pain or skin rash.    Had surgery for R shoulder rotator cuff full tear which was successful.    Had J&J covid vaccine on 4/8/2021, tolerated it well.      2/4/2022:       Had 2 sinusitis, went on 2 rounds of antibiotics. Had 3 different bacteria on the culture. Off antibiotic x 2 weeks, resumed AZA x past 2 weeks. Still gets PND and coughs a lot. ENT saw inflammation on exam with no infection, this was about 2.5 wk ago when he was recommended to resume AZA. Was off AZA from 9/2021 till two weeks ago.  1st and 2nd round of antibiotic, did not come off AZA, just with 3rd round. Has some SOB, uses inhalers more. Gets winded more with  exercise.     Today 8/19/2022: his nose gets stuffy on lying down. Saw ENT and was given prednisone and it helped some, but cough and congestion is coming back off prednisone. Took last prednisone last week. It was 40 mg every day max. Seasonal allergies are triggers. Sometimes it is harder to breathe when he is working out.     PMHx: EGPA    Family History   Problem Relation Age of Onset     Colon Cancer Maternal Grandmother      Stomach Cancer Paternal Grandmother      No Known Problems Mother      No Known Problems Father      No Known Problems Maternal Grandfather      No Known Problems Paternal Grandfather      No Known Problems Brother      No Known Problems Sister      No Known Problems Son      No Known Problems Daughter      No Known Problems Maternal Half-Brother      No Known Problems Maternal Half-Sister      No Known Problems Paternal Half-Brother      No Known Problems Paternal Half-Sister      No Known Problems Niece      No Known Problems Nephew      No Known Problems Cousin      No Known Problems Other      Cancer No family hx of      Diabetes No family hx of      Heart Disease No family hx of      Hypertension No family hx of      Cerebrovascular Disease No family hx of      Asthma No family hx of      Thyroid Disease No family hx of      Depression No family hx of      Mental Illness No family hx of      Substance Abuse No family hx of      Dementia No family hx of      Bleeding Disorder No family hx of      Congenital Anomalies No family hx of      Migraines No family hx of      Stomach Problem No family hx of      Muscular Disorder No family hx of      Osteoporosis No family hx of      Unknown/Adopted No family hx of      Social History     Socioeconomic History     Marital status:      Spouse name: Not on file     Number of children: 2     Years of education: Not on file     Highest education level: Not on file   Occupational History     Occupation:  manager   Tobacco Use      Smoking status: Never Smoker     Smokeless tobacco: Never Used   Substance and Sexual Activity     Alcohol use: Yes     Drug use: Never     Sexual activity: Not on file   Other Topics Concern     Not on file   Social History Narrative     Not on file     Social Determinants of Health     Financial Resource Strain: Not on file   Food Insecurity: Not on file   Transportation Needs: Not on file   Physical Activity: Not on file   Stress: Not on file   Social Connections: Not on file   Intimate Partner Violence: Not on file   Housing Stability: Not on file       No Known Allergies    Outpatient Encounter Medications as of 8/19/2022   Medication Sig Dispense Refill     albuterol (PROAIR HFA/PROVENTIL HFA/VENTOLIN HFA) 108 (90 Base) MCG/ACT inhaler as needed        azaTHIOprine (IMURAN) 50 MG tablet TAKE 1 TABLET BY MOUTH TWICE DAILY 180 tablet 0     budesonide (PULMICORT) 1 MG/2ML neb solution Take 1 mg by nebulization daily        cetirizine (ZYRTEC ALLERGY) 10 MG tablet Take 10 mg by mouth daily        clindamycin phosphate (EVOCLIN) 1 % external foam daily        diclofenac (VOLTAREN) 1 % topical gel Apply 2 g topically 4 times daily as needed for moderate pain To use over R shoulder 100 g 1     FLOVENT  MCG/ACT inhaler Inhale 1 puff into the lungs daily        mepolizumab (NUCALA) 100 MG/ML auto-injector Inject 3 mLs (300 mg) Subcutaneous every 28 days (The contents of 3 pens) 3 mL 1     montelukast (SINGULAIR) 10 MG tablet Take 10 mg by mouth At Bedtime        predniSONE (DELTASONE) 10 MG tablet 40-30-20-10 mg a day each for 3 days then stop 30 tablet 0     predniSONE (DELTASONE) 20 MG tablet 60 mg a day x 3 more days (including today) followed by 40 mg a day x 3 days, 20 mg a day x 3 days, 10 mg a day x 3 days then stop. 21 tablet 1     SYMBICORT 160-4.5 MCG/ACT Inhaler Inhale 2 puffs into the lungs 2 times daily        tretinoin (RETIN-A) 0.1 % external cream Apply topically as needed        No  facility-administered encounter medications on file as of 8/19/2022.           ROS:  A comprehensive ROS was done, positives are per HPI.        His records were reviewed.    4/2019: pos MPO, p-ANCA    7/2019: NL ESR/CRP    Component      Latest Ref Rng & Units 3/25/2020   WBC      4.0 - 11.0 10e9/L 3.8 (L)   RBC Count      4.4 - 5.9 10e12/L 4.53   Hemoglobin      13.3 - 17.7 g/dL 15.1   Hematocrit      40.0 - 53.0 % 45.3   MCV      78 - 100 fl 100   MCH      26.5 - 33.0 pg 33.3 (H)   MCHC      31.5 - 36.5 g/dL 33.3   RDW      10.0 - 15.0 % 13.0   Platelet Count      150 - 450 10e9/L 235   Diff Method       Automated Method   % Neutrophils      % 43.3   % Lymphocytes      % 41.0   % Monocytes      % 9.0   % Eosinophils      % 4.8   % Basophils      % 1.6   % Immature Granulocytes      % 0.3   Nucleated RBCs      0 /100 0   Absolute Neutrophil      1.6 - 8.3 10e9/L 1.6   Absolute Lymphocytes      0.8 - 5.3 10e9/L 1.5   Absolute Monocytes      0.0 - 1.3 10e9/L 0.3   Absolute Eosinophils      0.0 - 0.7 10e9/L 0.2   Absolute Basophils      0.0 - 0.2 10e9/L 0.1   Abs Immature Granulocytes      0 - 0.4 10e9/L 0.0   Absolute Nucleated RBC       0.0   Sodium      133 - 144 mmol/L 140   Potassium      3.4 - 5.3 mmol/L 3.6   Chloride      94 - 109 mmol/L 106   Carbon Dioxide      20 - 32 mmol/L 32   Anion Gap      3 - 14 mmol/L 2 (L)   Glucose      70 - 99 mg/dL 53 (L)   Urea Nitrogen      7 - 30 mg/dL 22   Creatinine      0.66 - 1.25 mg/dL 0.98   GFR Estimate      >60 mL/min/1.73:m2 >90   GFR Estimate If Black      >60 mL/min/1.73:m2 >90   Calcium      8.5 - 10.1 mg/dL 9.1   Bilirubin Total      0.2 - 1.3 mg/dL 0.6   Albumin      3.4 - 5.0 g/dL 4.4   Protein Total      6.8 - 8.8 g/dL 7.5   Alkaline Phosphatase      40 - 150 U/L 52   ALT      0 - 70 U/L 45   AST      0 - 45 U/L 28   Color Urine       Yellow   Appearance Urine       Clear   Glucose Urine      NEG:Negative mg/dL Negative   Bilirubin Urine      NEG:Negative  Negative   Ketones Urine      NEG:Negative mg/dL Negative   Specific Gravity Urine      1.003 - 1.035 1.006   Blood Urine      NEG:Negative Negative   pH Urine      5.0 - 7.0 pH 7.0   Protein Albumin Urine      NEG:Negative mg/dL Negative   Urobilinogen mg/dL      0.0 - 2.0 mg/dL 0.0   Nitrite Urine      NEG:Negative Negative   Leukocyte Esterase Urine      NEG:Negative Negative   Source       Midstream Urine   WBC Urine      0 - 5 /HPF 0   RBC Urine      0 - 2 /HPF 0   Squamous Epithelial /HPF Urine      0 - 1 /HPF <1   Neutrophil Cytoplasmic Antibody      <1:10 titer <1:10   Neutrophil Cytoplasmic Antibody Pattern       The ANCA IFA is <1:10.  No further testing will be performed.   Protein Random Urine      g/L <0.05   Protein Total Urine g/gr Creatinine      0 - 0.2 g/g Cr Unable to calculate due to low value   Myeloperoxidase Antibody IgG      0.0 - 0.9 AI 4.7 (H)   Proteinase 3 Antibody IgG      0.0 - 0.9 AI <0.2   IGE      0 - 114 KIU/L 22   CRP Inflammation      0.0 - 8.0 mg/L <2.9   Sed Rate      0 - 15 mm/h 5   Creatinine Urine      mg/dL 24     Stable labs in 2/2021    Component      Latest Ref Rng & Units 11/16/2021   Color Urine      Colorless, Straw, Light Yellow, Yellow Yellow   Appearance Urine      Clear Clear   Glucose Urine      Negative mg/dL Negative   Bilirubin Urine      Negative Negative   Ketones Urine      Negative mg/dL Negative   Specific Gravity Urine      1.003 - 1.035 1.009   Blood Urine      Negative Negative   pH Urine      5.0 - 7.0 6.0   Protein Albumin Urine      Negative mg/dL Negative   Urobilinogen mg/dL      Normal, 2.0 mg/dL Normal   Nitrite Urine      Negative Negative   Leukocyte Esterase Urine      Negative Negative   RBC Urine      <=2 /HPF <1   WBC Urine      <=5 /HPF 0   MPO Zenaida IgG Instrument Value      <3.5 U/mL 17.0 (H)   Myeloperoxidase Antibody IgG      Negative Positive (A)   Proteinase 3 Zenaida IgG Instrument Value      <2.0 U/mL <1.0   Proteinase 3 Antibody IgG       Negative Negative   Protein Random Urine      g/L <0.05   Protein Total Urine g/gr Creatinine          Creatinine Urine      mg/dL 37   IGG      610-1,616 mg/dL 1,053   IGA      84 - 499 mg/dL 178   IGM      35 - 242 mg/dL 28 (L)   Creatinine      0.66 - 1.25 mg/dL 0.92   GFR Estimate      >60 mL/min/1.73m2 >90   CD19 B Cells      6 - 27 % 7   Absolute CD19      107 - 698 cells/uL 92 (L)   Neutrophil Cytoplasmic Antibody      <1:10 <1:10   Neutrophil Cytoplasmic Antibody Pattern       The ANCA IFA is <1:10.  No further testing will be performed.   AST      0 - 45 U/L 34   ALT      0 - 70 U/L 46   Albumin      3.4 - 5.0 g/dL 3.8   CRP Inflammation      0.0 - 8.0 mg/L <2.9   Sed Rate      0 - 15 mm/hr 10     Component      Latest Ref Rng & Units 2/1/2022   Color Urine      Colorless, Straw, Light Yellow, Yellow Yellow   Appearance Urine      Clear Clear   Glucose Urine      Negative mg/dL Negative   Bilirubin Urine      Negative Negative   Ketones Urine      Negative mg/dL Negative   Specific Gravity Urine      1.003 - 1.035 1.004   Blood Urine      Negative Negative   pH Urine      5.0 - 7.0 6.0   Protein Albumin Urine      Negative mg/dL Negative   Urobilinogen mg/dL      Normal, 2.0 mg/dL Normal   Nitrite Urine      Negative Negative   Leukocyte Esterase Urine      Negative Negative   RBC Urine      <=2 /HPF <1   WBC Urine      <=5 /HPF <1   MPO Zenaida IgG Instrument Value      <3.5 U/mL    Myeloperoxidase Antibody IgG      Negative    Proteinase 3 Zenaida IgG Instrument Value      <2.0 U/mL    Proteinase 3 Antibody IgG      Negative    Protein Random Urine      g/L <0.05   Protein Total Urine g/gr Creatinine          Creatinine Urine      mg/dL 24   IGG      610-1,616 mg/dL 1,146   IGA      84 - 499 mg/dL 168   IGM      35 - 242 mg/dL 30 (L)   Creatinine      0.66 - 1.25 mg/dL 1.06   GFR Estimate      >60 mL/min/1.73m2 89   CD19 B Cells      6 - 27 % 7   Absolute CD19      107 - 698 cells/uL 116   Neutrophil Cytoplasmic  Antibody      <1:10 1:160 (H)   Neutrophil Cytoplasmic Antibody Pattern       Perinuclear ANCA (p-ANCA) staining pattern observed and confirmed on formalin-fixed neutrophils.   AST      0 - 45 U/L 38   ALT      0 - 70 U/L 40   Albumin      3.4 - 5.0 g/dL 3.7   CRP Inflammation      0.0 - 8.0 mg/L 7.4   Sed Rate      0 - 15 mm/hr 13     Component      Latest Ref Rng & Units 2/1/2022   WBC      4.0 - 11.0 10e3/uL 5.5   RBC Count      4.40 - 5.90 10e6/uL 4.39 (L)   Hemoglobin      13.3 - 17.7 g/dL 14.9   Hematocrit      40.0 - 53.0 % 42.8   MCV      78 - 100 fL 98   MCH      26.5 - 33.0 pg 33.9 (H)   MCHC      31.5 - 36.5 g/dL 34.8   RDW      10.0 - 15.0 % 12.2   Platelet Count      150 - 450 10e3/uL 257   % Neutrophils      % 54   % Lymphocytes      % 25   % Monocytes      % 10   % Eosinophils      % 9   % Basophils      % 2   % Immature Granulocytes      % 0   NRBCs per 100 WBC      <1 /100 0   Absolute Neutrophils      1.6 - 8.3 10e3/uL 3.0   Absolute Lymphocytes      0.8 - 5.3 10e3/uL 1.3   Absolute Monocytes      0.0 - 1.3 10e3/uL 0.6   Absolute Eosinophils      0.0 - 0.7 10e3/uL 0.5   Absolute Basophils      0.0 - 0.2 10e3/uL 0.1   Absolute Immature Granulocytes      <=0.4 10e3/uL 0.0   Absolute NRBCs      10e3/uL 0.0   Color Urine      Colorless, Straw, Light Yellow, Yellow Yellow   Appearance Urine      Clear Clear   Glucose Urine      Negative mg/dL Negative   Bilirubin Urine      Negative Negative   Ketones Urine      Negative mg/dL Negative   Specific Gravity Urine      1.003 - 1.035 1.004   Blood Urine      Negative Negative   pH Urine      5.0 - 7.0 6.0   Protein Albumin Urine      Negative mg/dL Negative   Urobilinogen mg/dL      Normal, 2.0 mg/dL Normal   Nitrite Urine      Negative Negative   Leukocyte Esterase Urine      Negative Negative   RBC Urine      <=2 /HPF <1   WBC Urine      <=5 /HPF <1   MPO Zenaida IgG Instrument Value      <3.5 U/mL 102.0 (H)   Myeloperoxidase Antibody IgG      Negative  Positive (A)   Proteinase 3 Zenaida IgG Instrument Value      <2.0 U/mL <1.0   Proteinase 3 Antibody IgG      Negative Negative   IGG      610-1,616 mg/dL 1,146   IGA      84 - 499 mg/dL 168   IGM      35 - 242 mg/dL 30 (L)   Protein Random Urine      g/L <0.05   Protein Total Urine g/gr Creatinine          Creatinine Urine      mg/dL 24   Neutrophil Cytoplasmic Antibody      <1:10 1:160 (H)   Neutrophil Cytoplasmic Antibody Pattern       Perinuclear ANCA (p-ANCA) staining pattern observed and confirmed on formalin-fixed neutrophils.   CD19 B Cells      6 - 27 % 7   Absolute CD19      107 - 698 cells/uL 116   Creatinine      0.66 - 1.25 mg/dL 1.06   GFR Estimate      >60 mL/min/1.73m2 89   Sed Rate      0 - 15 mm/hr 13   CRP Inflammation      0.0 - 8.0 mg/L 7.4   Albumin      3.4 - 5.0 g/dL 3.7   ALT      0 - 70 U/L 40   AST      0 - 45 U/L 38       Ph.E:    Constitutional: WD/WN. Pleasant. In no acute distress.  Eyes: EOM intact, sclera anicteric, conj not injected. No saddle nose deformity  MS: No joint swelling.   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 Failed methotrexate at 6 tab qwk (did not tolerate higher dose of 8 tab qwk), off prednisone. On AZA since 8/2019 (NL TPMT) but was put on hold in 11/2019 given recurrent sinusitis, antibiotic tx, finally required another sinus surgery/polypectomy for the flare on 12/10/2019 which was successful but the goal is to prevent recurrence of polyps and control disease. He was put back on AZA 50 mg bid since end of 3/2020, he had a transient elevated LFTs before resuming AZA which self-resolved without any intervention. Labs in 3/2020 showed improvement of MPO even before resuming AZA.    8/20/2021: On AZA 50 mg bid since end of 3/2020. Tolerates it well. Reports no recurrence of nasal polyps. He will do follow up with ENT. Labs from 2/2021 are stable. His EGPA seems to be stable. Reports seasonal  allergies at this time of the year, using inhalor. Nasal congestion got better by missing AZA x 3 days.    We discussed covid booster vaccine, no official recommendation for J&J receivers yet but most likely will need a booster being immunocompromised.    2/4/2022: Flaring with EGPA, sinus inflammation, increased vasculitis markers. On and off AZA due to sinus infections. Recommend to switch to nucala to control EGPA; risks were discussed. Will treat with prednisone taper.    Today 8/18/2022: Unclear if sinus sx are due to vasculitis and if he fails nucala or not, will get ENT opinion and check labs.    Today's plan:    Labs    Follow up with ENT    Medrol dose pack as needed    Return in 3-4 months (in person)          Orders Placed This Encounter   Procedures     AST     ALT     Myeloperoxidase and Proteinase 3 Panel     Albumin level     Creatinine     CRP inflammation     UA with Microscopic reflex to Culture     Protein  random urine     Creatinine random urine     Erythrocyte sedimentation rate auto     CD19 B Cell Count     ANCA IgG by IFA with Reflex to Titer     Immunoglobulins A G and M     CBC with Platelets & Differential           Isabella Manley MD

## 2022-08-19 NOTE — TELEPHONE ENCOUNTER
Called patient, but patient was at work. Stated he would like a call back at a later time. Patient needs a 3 to 4 month follow up, in-person with Dr. Manley as well as a lab apt prior to apt.     Thank you!

## 2022-08-19 NOTE — LETTER
8/19/2022       RE: Mj Mason Jr.  [address]  [place] [address]     Dear Colleague,    Thank you for referring your patient, Mj Mason Jr., to the [facility] [place] [facility]. Please see a copy of my visit note below.    Mj Mason Jr.  is being evaluated via a billable video visit.      Patient denies any changes since echeck-in regarding medication and allergies and states all information entered during echeck-in remains accurate.    How would you like to obtain your AVS? [employer]  For the video visit, send the invitation by: Text to cell phone: [phone]  Will anyone else be joining your video visit? No    Video Start Time: 10:52 AM  Video-Visit Details    Type of service:  Video Visit    Video End Time:  11:05 AM    Originating Location (pt. Location): Home    Distant Location (provider location):  [facility] [place]     Platform used for Video Visit: [employer]        Rheumatology F/U Virtual Visit Note    Reason for visit: ANCA-vasculitis, EGPA      Date of initial visit: 8/8/2019    Last seen: 2/4/2022    DOS: 8/19/2022    HPI     Mj Mason Jr. 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    Last visit:    He is doing fairly well. No recurrence of nasal polyps on imuran. Last seen by ENT in 12/2020, has upcoming appointment. Reports having seasonal allergies at this time of the year, has been using his inhalers x past 2 weeks. Reports some nasal congestion, it was better when he ran out of AZA x 3 days early this week.    No joint pain or skin rash.    Had surgery for R shoulder rotator cuff full tear which was successful.    Had J&J covid vaccine on 4/8/2021, tolerated it well.      2/4/2022:       Had 2 sinusitis, went on 2 rounds of antibiotics. Had 3 different bacteria on the culture. Off antibiotic x 2 weeks, resumed AZA x past 2 weeks. Still  gets PND and coughs a lot. ENT saw inflammation on exam with no infection, this was about 2.5 wk ago when he was recommended to resume AZA. Was off AZA from 9/2021 till two weeks ago.  1st and 2nd round of antibiotic, did not come off AZA, just with 3rd round. Has some SOB, uses inhalers more. Gets winded more with exercise.     Today 8/19/2022: his nose gets stuffy on lying down. Saw ENT and was given prednisone and it helped some, but cough and congestion is coming back off prednisone. Took last prednisone last week. It was 40 mg every day max. Seasonal allergies are triggers. Sometimes it is harder to breathe when he is working out.     PMHx: EGPA    Family History   Problem Relation Age of Onset     Colon Cancer Maternal Grandmother      Stomach Cancer Paternal Grandmother      No Known Problems Mother      No Known Problems Father      No Known Problems Maternal Grandfather      No Known Problems Paternal Grandfather      No Known Problems Brother      No Known Problems Sister      No Known Problems Son      No Known Problems Daughter      No Known Problems Maternal Half-Brother      No Known Problems Maternal Half-Sister      No Known Problems Paternal Half-Brother      No Known Problems Paternal Half-Sister      No Known Problems Niece      No Known Problems Nephew      No Known Problems Cousin      No Known Problems Other      Cancer No family hx of      Diabetes No family hx of      Heart Disease No family hx of      Hypertension No family hx of      Cerebrovascular Disease No family hx of      Asthma No family hx of      Thyroid Disease No family hx of      Depression No family hx of      Mental Illness No family hx of      Substance Abuse No family hx of      Dementia No family hx of      Bleeding Disorder No family hx of      Congenital Anomalies No family hx of      Migraines No family hx of      Stomach Problem No family hx of      Muscular Disorder No family hx of      Osteoporosis No family hx of       Unknown/Adopted No family hx of      Social History     Socioeconomic History     Marital status:      Spouse name: Not on file     Number of children: 2     Years of education: Not on file     Highest education level: Not on file   Occupational History     Occupation:  manager   Tobacco Use     Smoking status: Never Smoker     Smokeless tobacco: Never Used   Substance and Sexual Activity     Alcohol use: Yes     Drug use: Never     Sexual activity: Not on file   Other Topics Concern     Not on file   Social History Narrative     Not on file     Social Determinants of Health     Financial Resource Strain: Not on file   Food Insecurity: Not on file   Transportation Needs: Not on file   Physical Activity: Not on file   Stress: Not on file   Social Connections: Not on file   Intimate Partner Violence: Not on file   Housing Stability: Not on file       No Known Allergies    Outpatient Encounter Medications as of 8/19/2022   Medication Sig Dispense Refill     albuterol (PROAIR HFA/PROVENTIL HFA/VENTOLIN HFA) 108 (90 Base) MCG/ACT inhaler as needed        azaTHIOprine (IMURAN) 50 MG tablet TAKE 1 TABLET BY MOUTH TWICE DAILY 180 tablet 0     budesonide (PULMICORT) 1 MG/2ML neb solution Take 1 mg by nebulization daily        cetirizine (ZYRTEC ALLERGY) 10 MG tablet Take 10 mg by mouth daily        clindamycin phosphate (EVOCLIN) 1 % external foam daily        diclofenac (VOLTAREN) 1 % topical gel Apply 2 g topically 4 times daily as needed for moderate pain To use over R shoulder 100 g 1     FLOVENT  MCG/ACT inhaler Inhale 1 puff into the lungs daily        mepolizumab (NUCALA) 100 MG/ML auto-injector Inject 3 mLs (300 mg) Subcutaneous every 28 days (The contents of 3 pens) 3 mL 1     montelukast (SINGULAIR) 10 MG tablet Take 10 mg by mouth At Bedtime        predniSONE (DELTASONE) 10 MG tablet 40-30-20-10 mg a day each for 3 days then stop 30 tablet 0     predniSONE (DELTASONE) 20 MG tablet 60 mg a  day x 3 more days (including today) followed by 40 mg a day x 3 days, 20 mg a day x 3 days, 10 mg a day x 3 days then stop. 21 tablet 1     SYMBICORT 160-4.5 MCG/ACT Inhaler Inhale 2 puffs into the lungs 2 times daily        tretinoin (RETIN-A) 0.1 % external cream Apply topically as needed        No facility-administered encounter medications on file as of 8/19/2022.           ROS:  A comprehensive ROS was done, positives are per HPI.        His records were reviewed.    4/2019: pos MPO, p-ANCA    7/2019: NL ESR/CRP    Component      Latest Ref Rng & Units 3/25/2020   WBC      4.0 - 11.0 10e9/L 3.8 (L)   RBC Count      4.4 - 5.9 10e12/L 4.53   Hemoglobin      13.3 - 17.7 g/dL 15.1   Hematocrit      40.0 - 53.0 % 45.3   MCV      78 - 100 fl 100   MCH      26.5 - 33.0 pg 33.3 (H)   MCHC      31.5 - 36.5 g/dL 33.3   RDW      10.0 - 15.0 % 13.0   Platelet Count      150 - 450 10e9/L 235   Diff Method       Automated Method   % Neutrophils      % 43.3   % Lymphocytes      % 41.0   % Monocytes      % 9.0   % Eosinophils      % 4.8   % Basophils      % 1.6   % Immature Granulocytes      % 0.3   Nucleated RBCs      0 /100 0   Absolute Neutrophil      1.6 - 8.3 10e9/L 1.6   Absolute Lymphocytes      0.8 - 5.3 10e9/L 1.5   Absolute Monocytes      0.0 - 1.3 10e9/L 0.3   Absolute Eosinophils      0.0 - 0.7 10e9/L 0.2   Absolute Basophils      0.0 - 0.2 10e9/L 0.1   Abs Immature Granulocytes      0 - 0.4 10e9/L 0.0   Absolute Nucleated RBC       0.0   Sodium      133 - 144 mmol/L 140   Potassium      3.4 - 5.3 mmol/L 3.6   Chloride      94 - 109 mmol/L 106   Carbon Dioxide      20 - 32 mmol/L 32   Anion Gap      3 - 14 mmol/L 2 (L)   Glucose      70 - 99 mg/dL 53 (L)   Urea Nitrogen      7 - 30 mg/dL 22   Creatinine      0.66 - 1.25 mg/dL 0.98   GFR Estimate      >60 mL/min/1.73:m2 >90   GFR Estimate If Black      >60 mL/min/1.73:m2 >90   Calcium      8.5 - 10.1 mg/dL 9.1   Bilirubin Total      0.2 - 1.3 mg/dL 0.6   Albumin       3.4 - 5.0 g/dL 4.4   Protein Total      6.8 - 8.8 g/dL 7.5   Alkaline Phosphatase      40 - 150 U/L 52   ALT      0 - 70 U/L 45   AST      0 - 45 U/L 28   Color Urine       Yellow   Appearance Urine       Clear   Glucose Urine      NEG:Negative mg/dL Negative   Bilirubin Urine      NEG:Negative Negative   Ketones Urine      NEG:Negative mg/dL Negative   Specific Gravity Urine      1.003 - 1.035 1.006   Blood Urine      NEG:Negative Negative   pH Urine      5.0 - 7.0 pH 7.0   Protein Albumin Urine      NEG:Negative mg/dL Negative   Urobilinogen mg/dL      0.0 - 2.0 mg/dL 0.0   Nitrite Urine      NEG:Negative Negative   Leukocyte Esterase Urine      NEG:Negative Negative   Source       Midstream Urine   WBC Urine      0 - 5 /HPF 0   RBC Urine      0 - 2 /HPF 0   Squamous Epithelial /HPF Urine      0 - 1 /HPF <1   Neutrophil Cytoplasmic Antibody      <1:10 titer <1:10   Neutrophil Cytoplasmic Antibody Pattern       The ANCA IFA is <1:10.  No further testing will be performed.   Protein Random Urine      g/L <0.05   Protein Total Urine g/gr Creatinine      0 - 0.2 g/g Cr Unable to calculate due to low value   Myeloperoxidase Antibody IgG      0.0 - 0.9 AI 4.7 (H)   Proteinase 3 Antibody IgG      0.0 - 0.9 AI <0.2   IGE      0 - 114 KIU/L 22   CRP Inflammation      0.0 - 8.0 mg/L <2.9   Sed Rate      0 - 15 mm/h 5   Creatinine Urine      mg/dL 24     Stable labs in 2/2021    Component      Latest Ref Rng & Units 11/16/2021   Color Urine      Colorless, Straw, Light Yellow, Yellow Yellow   Appearance Urine      Clear Clear   Glucose Urine      Negative mg/dL Negative   Bilirubin Urine      Negative Negative   Ketones Urine      Negative mg/dL Negative   Specific Gravity Urine      1.003 - 1.035 1.009   Blood Urine      Negative Negative   pH Urine      5.0 - 7.0 6.0   Protein Albumin Urine      Negative mg/dL Negative   Urobilinogen mg/dL      Normal, 2.0 mg/dL Normal   Nitrite Urine      Negative Negative   Leukocyte  Esterase Urine      Negative Negative   RBC Urine      <=2 /HPF <1   WBC Urine      <=5 /HPF 0   MPO Zenaida IgG Instrument Value      <3.5 U/mL 17.0 (H)   Myeloperoxidase Antibody IgG      Negative Positive (A)   Proteinase 3 Zenaida IgG Instrument Value      <2.0 U/mL <1.0   Proteinase 3 Antibody IgG      Negative Negative   Protein Random Urine      g/L <0.05   Protein Total Urine g/gr Creatinine          Creatinine Urine      mg/dL 37   IGG      610-1,616 mg/dL 1,053   IGA      84 - 499 mg/dL 178   IGM      35 - 242 mg/dL 28 (L)   Creatinine      0.66 - 1.25 mg/dL 0.92   GFR Estimate      >60 mL/min/1.73m2 >90   CD19 B Cells      6 - 27 % 7   Absolute CD19      107 - 698 cells/uL 92 (L)   Neutrophil Cytoplasmic Antibody      <1:10 <1:10   Neutrophil Cytoplasmic Antibody Pattern       The ANCA IFA is <1:10.  No further testing will be performed.   AST      0 - 45 U/L 34   ALT      0 - 70 U/L 46   Albumin      3.4 - 5.0 g/dL 3.8   CRP Inflammation      0.0 - 8.0 mg/L <2.9   Sed Rate      0 - 15 mm/hr 10     Component      Latest Ref Rng & Units 2/1/2022   Color Urine      Colorless, Straw, Light Yellow, Yellow Yellow   Appearance Urine      Clear Clear   Glucose Urine      Negative mg/dL Negative   Bilirubin Urine      Negative Negative   Ketones Urine      Negative mg/dL Negative   Specific Gravity Urine      1.003 - 1.035 1.004   Blood Urine      Negative Negative   pH Urine      5.0 - 7.0 6.0   Protein Albumin Urine      Negative mg/dL Negative   Urobilinogen mg/dL      Normal, 2.0 mg/dL Normal   Nitrite Urine      Negative Negative   Leukocyte Esterase Urine      Negative Negative   RBC Urine      <=2 /HPF <1   WBC Urine      <=5 /HPF <1   MPO Zenaida IgG Instrument Value      <3.5 U/mL    Myeloperoxidase Antibody IgG      Negative    Proteinase 3 Zenaida IgG Instrument Value      <2.0 U/mL    Proteinase 3 Antibody IgG      Negative    Protein Random Urine      g/L <0.05   Protein Total Urine g/gr Creatinine           Creatinine Urine      mg/dL 24   IGG      610-1,616 mg/dL 1,146   IGA      84 - 499 mg/dL 168   IGM      35 - 242 mg/dL 30 (L)   Creatinine      0.66 - 1.25 mg/dL 1.06   GFR Estimate      >60 mL/min/1.73m2 89   CD19 B Cells      6 - 27 % 7   Absolute CD19      107 - 698 cells/uL 116   Neutrophil Cytoplasmic Antibody      <1:10 1:160 (H)   Neutrophil Cytoplasmic Antibody Pattern       Perinuclear ANCA (p-ANCA) staining pattern observed and confirmed on formalin-fixed neutrophils.   AST      0 - 45 U/L 38   ALT      0 - 70 U/L 40   Albumin      3.4 - 5.0 g/dL 3.7   CRP Inflammation      0.0 - 8.0 mg/L 7.4   Sed Rate      0 - 15 mm/hr 13     Component      Latest Ref Rng & Units 2/1/2022   WBC      4.0 - 11.0 10e3/uL 5.5   RBC Count      4.40 - 5.90 10e6/uL 4.39 (L)   Hemoglobin      13.3 - 17.7 g/dL 14.9   Hematocrit      40.0 - 53.0 % 42.8   MCV      78 - 100 fL 98   MCH      26.5 - 33.0 pg 33.9 (H)   MCHC      31.5 - 36.5 g/dL 34.8   RDW      10.0 - 15.0 % 12.2   Platelet Count      150 - 450 10e3/uL 257   % Neutrophils      % 54   % Lymphocytes      % 25   % Monocytes      % 10   % Eosinophils      % 9   % Basophils      % 2   % Immature Granulocytes      % 0   NRBCs per 100 WBC      <1 /100 0   Absolute Neutrophils      1.6 - 8.3 10e3/uL 3.0   Absolute Lymphocytes      0.8 - 5.3 10e3/uL 1.3   Absolute Monocytes      0.0 - 1.3 10e3/uL 0.6   Absolute Eosinophils      0.0 - 0.7 10e3/uL 0.5   Absolute Basophils      0.0 - 0.2 10e3/uL 0.1   Absolute Immature Granulocytes      <=0.4 10e3/uL 0.0   Absolute NRBCs      10e3/uL 0.0   Color Urine      Colorless, Straw, Light Yellow, Yellow Yellow   Appearance Urine      Clear Clear   Glucose Urine      Negative mg/dL Negative   Bilirubin Urine      Negative Negative   Ketones Urine      Negative mg/dL Negative   Specific Gravity Urine      1.003 - 1.035 1.004   Blood Urine      Negative Negative   pH Urine      5.0 - 7.0 6.0   Protein Albumin Urine      Negative mg/dL  Negative   Urobilinogen mg/dL      Normal, 2.0 mg/dL Normal   Nitrite Urine      Negative Negative   Leukocyte Esterase Urine      Negative Negative   RBC Urine      <=2 /HPF <1   WBC Urine      <=5 /HPF <1   MPO Zenaida IgG Instrument Value      <3.5 U/mL 102.0 (H)   Myeloperoxidase Antibody IgG      Negative Positive (A)   Proteinase 3 Zenaida IgG Instrument Value      <2.0 U/mL <1.0   Proteinase 3 Antibody IgG      Negative Negative   IGG      610-1,616 mg/dL 1,146   IGA      84 - 499 mg/dL 168   IGM      35 - 242 mg/dL 30 (L)   Protein Random Urine      g/L <0.05   Protein Total Urine g/gr Creatinine          Creatinine Urine      mg/dL 24   Neutrophil Cytoplasmic Antibody      <1:10 1:160 (H)   Neutrophil Cytoplasmic Antibody Pattern       Perinuclear ANCA (p-ANCA) staining pattern observed and confirmed on formalin-fixed neutrophils.   CD19 B Cells      6 - 27 % 7   Absolute CD19      107 - 698 cells/uL 116   Creatinine      0.66 - 1.25 mg/dL 1.06   GFR Estimate      >60 mL/min/1.73m2 89   Sed Rate      0 - 15 mm/hr 13   CRP Inflammation      0.0 - 8.0 mg/L 7.4   Albumin      3.4 - 5.0 g/dL 3.7   ALT      0 - 70 U/L 40   AST      0 - 45 U/L 38       Ph.E:    Constitutional: WD/WN. Pleasant. In no acute distress.  Eyes: EOM intact, sclera anicteric, conj not injected. No saddle nose deformity  MS: No joint swelling.   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 Failed methotrexate at 6 tab qwk (did not tolerate higher dose of 8 tab qwk), off prednisone. On AZA since 8/2019 (NL TPMT) but was put on hold in 11/2019 given recurrent sinusitis, antibiotic tx, finally required another sinus surgery/polypectomy for the flare on 12/10/2019 which was successful but the goal is to prevent recurrence of polyps and control disease. He was put back on AZA 50 mg bid since end of 3/2020, he had a transient elevated LFTs before resuming AZA which self-resolved  without any intervention. Labs in 3/2020 showed improvement of MPO even before resuming AZA.    8/20/2021: On AZA 50 mg bid since end of 3/2020. Tolerates it well. Reports no recurrence of nasal polyps. He will do follow up with ENT. Labs from 2/2021 are stable. His EGPA seems to be stable. Reports seasonal allergies at this time of the year, using inhalor. Nasal congestion got better by missing AZA x 3 days.    We discussed covid booster vaccine, no official recommendation for J&J receivers yet but most likely will need a booster being immunocompromised.    2/4/2022: Flaring with EGPA, sinus inflammation, increased vasculitis markers. On and off AZA due to sinus infections. Recommend to switch to nucala to control EGPA; risks were discussed. Will treat with prednisone taper.    Today 8/18/2022: Unclear if sinus sx are due to vasculitis and if he fails nucala or not, will get ENT opinion and check labs.    Today's plan:    Labs    Follow up with ENT    Medrol dose pack as needed    Return in 3-4 months (in person)          Orders Placed This Encounter   Procedures     AST     ALT     Myeloperoxidase and Proteinase 3 Panel     Albumin level     Creatinine     CRP inflammation     UA with Microscopic reflex to Culture     Protein  random urine     Creatinine random urine     Erythrocyte sedimentation rate auto     CD19 B Cell Count     ANCA IgG by IFA with Reflex to Titer     Immunoglobulins A G and M     CBC with Platelets & Differential           Isabella Manley MD          Again, thank you for allowing me to participate in the care of your patient.      Sincerely,    Isabella Manlye MD

## 2022-09-12 ENCOUNTER — LAB (OUTPATIENT)
Dept: LAB | Facility: CLINIC | Age: 42
End: 2022-09-12
Payer: COMMERCIAL

## 2022-09-12 DIAGNOSIS — Z51.81 MEDICATION MONITORING ENCOUNTER: ICD-10-CM

## 2022-09-12 DIAGNOSIS — D72.18 EOSINOPHILIC GRANULOMATOSIS WITH POLYANGIITIS (EGPA) (H): ICD-10-CM

## 2022-09-12 DIAGNOSIS — M30.1 EOSINOPHILIC GRANULOMATOSIS WITH POLYANGIITIS (EGPA) (H): ICD-10-CM

## 2022-09-12 LAB
ALBUMIN MFR UR ELPH: <6 MG/DL
ALBUMIN SERPL BCG-MCNC: 4.5 G/DL (ref 3.5–5.2)
ALBUMIN UR-MCNC: NEGATIVE MG/DL
ALT SERPL W P-5'-P-CCNC: 35 U/L (ref 10–50)
APPEARANCE UR: CLEAR
AST SERPL W P-5'-P-CCNC: 36 U/L (ref 10–50)
BASOPHILS # BLD AUTO: 0.1 10E3/UL (ref 0–0.2)
BASOPHILS NFR BLD AUTO: 1 %
BILIRUB UR QL STRIP: NEGATIVE
COLOR UR AUTO: YELLOW
CREAT SERPL-MCNC: 1.06 MG/DL (ref 0.67–1.17)
CREAT UR-MCNC: 20.3 MG/DL
CRP SERPL-MCNC: <3 MG/L
EOSINOPHIL # BLD AUTO: 0 10E3/UL (ref 0–0.7)
EOSINOPHIL NFR BLD AUTO: 1 %
ERYTHROCYTE [DISTWIDTH] IN BLOOD BY AUTOMATED COUNT: 12.5 % (ref 10–15)
ERYTHROCYTE [SEDIMENTATION RATE] IN BLOOD BY WESTERGREN METHOD: 6 MM/HR (ref 0–15)
GFR SERPL CREATININE-BSD FRML MDRD: 89 ML/MIN/1.73M2
GLUCOSE UR STRIP-MCNC: NEGATIVE MG/DL
HCT VFR BLD AUTO: 43.5 % (ref 40–53)
HGB BLD-MCNC: 15 G/DL (ref 13.3–17.7)
HGB UR QL STRIP: NEGATIVE
IMM GRANULOCYTES # BLD: 0 10E3/UL
IMM GRANULOCYTES NFR BLD: 1 %
KETONES UR STRIP-MCNC: NEGATIVE MG/DL
LEUKOCYTE ESTERASE UR QL STRIP: NEGATIVE
LYMPHOCYTES # BLD AUTO: 1.8 10E3/UL (ref 0.8–5.3)
LYMPHOCYTES NFR BLD AUTO: 36 %
MCH RBC QN AUTO: 33.3 PG (ref 26.5–33)
MCHC RBC AUTO-ENTMCNC: 34.5 G/DL (ref 31.5–36.5)
MCV RBC AUTO: 97 FL (ref 78–100)
MONOCYTES # BLD AUTO: 0.6 10E3/UL (ref 0–1.3)
MONOCYTES NFR BLD AUTO: 12 %
NEUTROPHILS # BLD AUTO: 2.4 10E3/UL (ref 1.6–8.3)
NEUTROPHILS NFR BLD AUTO: 49 %
NITRATE UR QL: NEGATIVE
NRBC # BLD AUTO: 0 10E3/UL
NRBC BLD AUTO-RTO: 0 /100
PH UR STRIP: 5 [PH] (ref 5–7)
PLATELET # BLD AUTO: 221 10E3/UL (ref 150–450)
PROT/CREAT 24H UR: NORMAL MG/G{CREAT}
RBC # BLD AUTO: 4.5 10E6/UL (ref 4.4–5.9)
RBC URINE: 0 /HPF
SP GR UR STRIP: 1 (ref 1–1.03)
UROBILINOGEN UR STRIP-MCNC: NORMAL MG/DL
WBC # BLD AUTO: 5 10E3/UL (ref 4–11)
WBC URINE: 0 /HPF

## 2022-09-12 PROCEDURE — 85025 COMPLETE CBC W/AUTO DIFF WBC: CPT | Performed by: PATHOLOGY

## 2022-09-12 PROCEDURE — 81001 URINALYSIS AUTO W/SCOPE: CPT | Performed by: PATHOLOGY

## 2022-09-12 PROCEDURE — 85652 RBC SED RATE AUTOMATED: CPT | Performed by: PATHOLOGY

## 2022-09-12 PROCEDURE — 86140 C-REACTIVE PROTEIN: CPT | Performed by: PATHOLOGY

## 2022-09-12 PROCEDURE — 82565 ASSAY OF CREATININE: CPT | Performed by: PATHOLOGY

## 2022-09-12 PROCEDURE — 36415 COLL VENOUS BLD VENIPUNCTURE: CPT | Performed by: PATHOLOGY

## 2022-09-12 PROCEDURE — 83516 IMMUNOASSAY NONANTIBODY: CPT | Performed by: PATHOLOGY

## 2022-09-12 PROCEDURE — 84156 ASSAY OF PROTEIN URINE: CPT | Performed by: PATHOLOGY

## 2022-09-12 PROCEDURE — 82040 ASSAY OF SERUM ALBUMIN: CPT | Performed by: PATHOLOGY

## 2022-09-12 PROCEDURE — 86256 FLUORESCENT ANTIBODY TITER: CPT | Performed by: PATHOLOGY

## 2022-09-12 PROCEDURE — 82784 ASSAY IGA/IGD/IGG/IGM EACH: CPT | Performed by: PATHOLOGY

## 2022-09-12 PROCEDURE — 83876 ASSAY MYELOPEROXIDASE: CPT | Performed by: PATHOLOGY

## 2022-09-12 PROCEDURE — 84450 TRANSFERASE (AST) (SGOT): CPT | Performed by: PATHOLOGY

## 2022-09-12 PROCEDURE — 84460 ALANINE AMINO (ALT) (SGPT): CPT | Performed by: PATHOLOGY

## 2022-09-12 PROCEDURE — 99000 SPECIMEN HANDLING OFFICE-LAB: CPT | Performed by: PATHOLOGY

## 2022-09-12 PROCEDURE — 86036 ANCA SCREEN EACH ANTIBODY: CPT | Performed by: PATHOLOGY

## 2022-09-12 PROCEDURE — 86355 B CELLS TOTAL COUNT: CPT | Performed by: PATHOLOGY

## 2022-09-13 LAB
ANCA AB PATTERN SER IF-IMP: NORMAL
C-ANCA TITR SER IF: NORMAL {TITER}
CD19 CELLS # BLD: 151 CELLS/UL (ref 107–698)
CD19 CELLS NFR BLD: 8 % (ref 6–27)
IGA SERPL-MCNC: 160 MG/DL (ref 84–499)
IGG SERPL-MCNC: 906 MG/DL (ref 610–1616)
IGM SERPL-MCNC: 32 MG/DL (ref 35–242)
MYELOPEROXIDASE AB SER IA-ACNC: 8 U/ML
MYELOPEROXIDASE AB SER IA-ACNC: POSITIVE
PROTEINASE3 AB SER IA-ACNC: <1 U/ML
PROTEINASE3 AB SER IA-ACNC: NEGATIVE

## 2022-09-19 ENCOUNTER — MYC MEDICAL ADVICE (OUTPATIENT)
Dept: RHEUMATOLOGY | Facility: CLINIC | Age: 42
End: 2022-09-19

## 2022-09-23 ENCOUNTER — OFFICE VISIT (OUTPATIENT)
Dept: RHEUMATOLOGY | Facility: CLINIC | Age: 42
End: 2022-09-23
Attending: INTERNAL MEDICINE
Payer: COMMERCIAL

## 2022-09-23 VITALS
SYSTOLIC BLOOD PRESSURE: 150 MMHG | HEART RATE: 66 BPM | BODY MASS INDEX: 31.64 KG/M2 | DIASTOLIC BLOOD PRESSURE: 86 MMHG | OXYGEN SATURATION: 97 % | WEIGHT: 208.8 LBS | HEIGHT: 68 IN

## 2022-09-23 DIAGNOSIS — Z51.81 MEDICATION MONITORING ENCOUNTER: Primary | ICD-10-CM

## 2022-09-23 DIAGNOSIS — M30.1 EOSINOPHILIC GRANULOMATOSIS WITH POLYANGIITIS (EGPA) (H): ICD-10-CM

## 2022-09-23 DIAGNOSIS — D72.18 EOSINOPHILIC GRANULOMATOSIS WITH POLYANGIITIS (EGPA) (H): ICD-10-CM

## 2022-09-23 PROCEDURE — 99215 OFFICE O/P EST HI 40 MIN: CPT | Performed by: INTERNAL MEDICINE

## 2022-09-23 RX ORDER — METHYLPREDNISOLONE 4 MG
TABLET, DOSE PACK ORAL
Qty: 21 TABLET | Refills: 3
Start: 2022-09-23 | End: 2022-11-30

## 2022-09-23 RX ORDER — LEVOCETIRIZINE DIHYDROCHLORIDE 5 MG/1
5 TABLET, FILM COATED ORAL EVERY MORNING
COMMUNITY

## 2022-09-23 RX ORDER — MYCOPHENOLATE MOFETIL 500 MG/1
500 TABLET ORAL 2 TIMES DAILY
Qty: 60 TABLET | Refills: 3 | Status: SHIPPED | OUTPATIENT
Start: 2022-09-23 | End: 2023-03-22

## 2022-09-23 ASSESSMENT — PAIN SCALES - GENERAL: PAINLEVEL: NO PAIN (0)

## 2022-09-23 NOTE — PROGRESS NOTES
Rheumatology F/U In Person Visit Note    Reason for visit: ANCA-vasculitis, EGPA      Date of initial visit: 8/8/2019    Last seen: 8/19/2022    DOS: 9/23/2022    HPI     Mj Mason Jr. 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    Last visit:    He is doing fairly well. No recurrence of nasal polyps on imuran. Last seen by ENT in 12/2020, has upcoming appointment. Reports having seasonal allergies at this time of the year, has been using his inhalers x past 2 weeks. Reports some nasal congestion, it was better when he ran out of AZA x 3 days early this week.    No joint pain or skin rash.    Had surgery for R shoulder rotator cuff full tear which was successful.    Had J&J covid vaccine on 4/8/2021, tolerated it well.      2/4/2022:       Had 2 sinusitis, went on 2 rounds of antibiotics. Had 3 different bacteria on the culture. Off antibiotic x 2 weeks, resumed AZA x past 2 weeks. Still gets PND and coughs a lot. ENT saw inflammation on exam with no infection, this was about 2.5 wk ago when he was recommended to resume AZA. Was off AZA from 9/2021 till two weeks ago.  1st and 2nd round of antibiotic, did not come off AZA, just with 3rd round. Has some SOB, uses inhalers more. Gets winded more with exercise.     8/19/2022: His nose gets stuffy on lying down. Saw ENT and was given prednisone and it helped some, but cough and congestion is coming back off prednisone. Took last prednisone last week. It was 40 mg every day max. Seasonal allergies are triggers. Sometimes it is harder to breathe when he is working out.       Today 9/23/2022; Lucio presents for follow up, continues to have flares of sinusitis with nasal polyps despite being on nucala, which are steroid responsive.      He sleeps at the side, has block nostril, was better on medrol dose pack.    Medrol dose pack finished yesterday. Last antibiotic was a month ago. Medrol works better, prednisone does not help.    Uses inhaler  to prevent asthma.    PMHx: EGPA    Family History   Problem Relation Age of Onset     Colon Cancer Maternal Grandmother      Stomach Cancer Paternal Grandmother      No Known Problems Mother      No Known Problems Father      No Known Problems Maternal Grandfather      No Known Problems Paternal Grandfather      No Known Problems Brother      No Known Problems Sister      No Known Problems Son      No Known Problems Daughter      No Known Problems Maternal Half-Brother      No Known Problems Maternal Half-Sister      No Known Problems Paternal Half-Brother      No Known Problems Paternal Half-Sister      No Known Problems Niece      No Known Problems Nephew      No Known Problems Cousin      No Known Problems Other      Cancer No family hx of      Diabetes No family hx of      Heart Disease No family hx of      Hypertension No family hx of      Cerebrovascular Disease No family hx of      Asthma No family hx of      Thyroid Disease No family hx of      Depression No family hx of      Mental Illness No family hx of      Substance Abuse No family hx of      Dementia No family hx of      Bleeding Disorder No family hx of      Congenital Anomalies No family hx of      Migraines No family hx of      Stomach Problem No family hx of      Muscular Disorder No family hx of      Osteoporosis No family hx of      Unknown/Adopted No family hx of      Social History     Socioeconomic History     Marital status:      Spouse name: Not on file     Number of children: 2     Years of education: Not on file     Highest education level: Not on file   Occupational History     Occupation:  manager   Tobacco Use     Smoking status: Never Smoker     Smokeless tobacco: Never Used   Substance and Sexual Activity     Alcohol use: Yes     Drug use: Never     Sexual activity: Not on file   Other Topics Concern     Not on file   Social History Narrative     Not on file     Social Determinants of Health     Financial Resource  Strain: Not on file   Food Insecurity: Not on file   Transportation Needs: Not on file   Physical Activity: Not on file   Stress: Not on file   Social Connections: Not on file   Intimate Partner Violence: Not on file   Housing Stability: Not on file       No Known Allergies    Outpatient Encounter Medications as of 9/23/2022   Medication Sig Dispense Refill     albuterol (PROAIR HFA/PROVENTIL HFA/VENTOLIN HFA) 108 (90 Base) MCG/ACT inhaler as needed        budesonide (PULMICORT) 1 MG/2ML neb solution Take 1 mg by nebulization daily        levocetirizine (XYZAL) 5 MG tablet Take 5 mg by mouth every morning       montelukast (SINGULAIR) 10 MG tablet Take 10 mg by mouth At Bedtime        SYMBICORT 160-4.5 MCG/ACT Inhaler Inhale 2 puffs into the lungs 2 times daily        azaTHIOprine (IMURAN) 50 MG tablet TAKE 1 TABLET BY MOUTH TWICE DAILY (Patient not taking: Reported on 9/23/2022) 180 tablet 0     cetirizine (ZYRTEC) 10 MG tablet Take 10 mg by mouth daily  (Patient not taking: Reported on 9/23/2022)       clindamycin phosphate (EVOCLIN) 1 % external foam daily  (Patient not taking: Reported on 9/23/2022)       diclofenac (VOLTAREN) 1 % topical gel Apply 2 g topically 4 times daily as needed for moderate pain To use over R shoulder (Patient not taking: Reported on 9/23/2022) 100 g 1     FLOVENT  MCG/ACT inhaler Inhale 1 puff into the lungs daily  (Patient not taking: Reported on 9/23/2022)       mepolizumab (NUCALA) 100 MG/ML auto-injector Inject 3 mLs (300 mg) Subcutaneous every 28 days (The contents of 3 pens) (Patient not taking: Reported on 9/23/2022) 3 mL 1     methylPREDNISolone (MEDROL DOSEPAK) 4 MG tablet therapy pack Take per package instructions. As needed for flare up (Patient not taking: Reported on 9/23/2022) 21 tablet 3     predniSONE (DELTASONE) 10 MG tablet 40-30-20-10 mg a day each for 3 days then stop (Patient not taking: Reported on 9/23/2022) 30 tablet 0     predniSONE (DELTASONE) 20 MG  tablet 60 mg a day x 3 more days (including today) followed by 40 mg a day x 3 days, 20 mg a day x 3 days, 10 mg a day x 3 days then stop. (Patient not taking: Reported on 9/23/2022) 21 tablet 1     tretinoin (RETIN-A) 0.1 % external cream Apply topically as needed  (Patient not taking: Reported on 9/23/2022)       No facility-administered encounter medications on file as of 9/23/2022.           ROS:  A comprehensive ROS was done, positives are per HPI.        His records were reviewed.    4/2019: pos MPO, p-ANCA    7/2019: NL ESR/CRP    Component      Latest Ref Rng & Units 3/25/2020   WBC      4.0 - 11.0 10e9/L 3.8 (L)   RBC Count      4.4 - 5.9 10e12/L 4.53   Hemoglobin      13.3 - 17.7 g/dL 15.1   Hematocrit      40.0 - 53.0 % 45.3   MCV      78 - 100 fl 100   MCH      26.5 - 33.0 pg 33.3 (H)   MCHC      31.5 - 36.5 g/dL 33.3   RDW      10.0 - 15.0 % 13.0   Platelet Count      150 - 450 10e9/L 235   Diff Method       Automated Method   % Neutrophils      % 43.3   % Lymphocytes      % 41.0   % Monocytes      % 9.0   % Eosinophils      % 4.8   % Basophils      % 1.6   % Immature Granulocytes      % 0.3   Nucleated RBCs      0 /100 0   Absolute Neutrophil      1.6 - 8.3 10e9/L 1.6   Absolute Lymphocytes      0.8 - 5.3 10e9/L 1.5   Absolute Monocytes      0.0 - 1.3 10e9/L 0.3   Absolute Eosinophils      0.0 - 0.7 10e9/L 0.2   Absolute Basophils      0.0 - 0.2 10e9/L 0.1   Abs Immature Granulocytes      0 - 0.4 10e9/L 0.0   Absolute Nucleated RBC       0.0   Sodium      133 - 144 mmol/L 140   Potassium      3.4 - 5.3 mmol/L 3.6   Chloride      94 - 109 mmol/L 106   Carbon Dioxide      20 - 32 mmol/L 32   Anion Gap      3 - 14 mmol/L 2 (L)   Glucose      70 - 99 mg/dL 53 (L)   Urea Nitrogen      7 - 30 mg/dL 22   Creatinine      0.66 - 1.25 mg/dL 0.98   GFR Estimate      >60 mL/min/1.73:m2 >90   GFR Estimate If Black      >60 mL/min/1.73:m2 >90   Calcium      8.5 - 10.1 mg/dL 9.1   Bilirubin Total      0.2 - 1.3 mg/dL  0.6   Albumin      3.4 - 5.0 g/dL 4.4   Protein Total      6.8 - 8.8 g/dL 7.5   Alkaline Phosphatase      40 - 150 U/L 52   ALT      0 - 70 U/L 45   AST      0 - 45 U/L 28   Color Urine       Yellow   Appearance Urine       Clear   Glucose Urine      NEG:Negative mg/dL Negative   Bilirubin Urine      NEG:Negative Negative   Ketones Urine      NEG:Negative mg/dL Negative   Specific Gravity Urine      1.003 - 1.035 1.006   Blood Urine      NEG:Negative Negative   pH Urine      5.0 - 7.0 pH 7.0   Protein Albumin Urine      NEG:Negative mg/dL Negative   Urobilinogen mg/dL      0.0 - 2.0 mg/dL 0.0   Nitrite Urine      NEG:Negative Negative   Leukocyte Esterase Urine      NEG:Negative Negative   Source       Midstream Urine   WBC Urine      0 - 5 /HPF 0   RBC Urine      0 - 2 /HPF 0   Squamous Epithelial /HPF Urine      0 - 1 /HPF <1   Neutrophil Cytoplasmic Antibody      <1:10 titer <1:10   Neutrophil Cytoplasmic Antibody Pattern       The ANCA IFA is <1:10.  No further testing will be performed.   Protein Random Urine      g/L <0.05   Protein Total Urine g/gr Creatinine      0 - 0.2 g/g Cr Unable to calculate due to low value   Myeloperoxidase Antibody IgG      0.0 - 0.9 AI 4.7 (H)   Proteinase 3 Antibody IgG      0.0 - 0.9 AI <0.2   IGE      0 - 114 KIU/L 22   CRP Inflammation      0.0 - 8.0 mg/L <2.9   Sed Rate      0 - 15 mm/h 5   Creatinine Urine      mg/dL 24     Stable labs in 2/2021    Component      Latest Ref Rng & Units 11/16/2021   Color Urine      Colorless, Straw, Light Yellow, Yellow Yellow   Appearance Urine      Clear Clear   Glucose Urine      Negative mg/dL Negative   Bilirubin Urine      Negative Negative   Ketones Urine      Negative mg/dL Negative   Specific Gravity Urine      1.003 - 1.035 1.009   Blood Urine      Negative Negative   pH Urine      5.0 - 7.0 6.0   Protein Albumin Urine      Negative mg/dL Negative   Urobilinogen mg/dL      Normal, 2.0 mg/dL Normal   Nitrite Urine      Negative  Negative   Leukocyte Esterase Urine      Negative Negative   RBC Urine      <=2 /HPF <1   WBC Urine      <=5 /HPF 0   MPO Zenaida IgG Instrument Value      <3.5 U/mL 17.0 (H)   Myeloperoxidase Antibody IgG      Negative Positive (A)   Proteinase 3 Zenaida IgG Instrument Value      <2.0 U/mL <1.0   Proteinase 3 Antibody IgG      Negative Negative   Protein Random Urine      g/L <0.05   Protein Total Urine g/gr Creatinine          Creatinine Urine      mg/dL 37   IGG      610-1,616 mg/dL 1,053   IGA      84 - 499 mg/dL 178   IGM      35 - 242 mg/dL 28 (L)   Creatinine      0.66 - 1.25 mg/dL 0.92   GFR Estimate      >60 mL/min/1.73m2 >90   CD19 B Cells      6 - 27 % 7   Absolute CD19      107 - 698 cells/uL 92 (L)   Neutrophil Cytoplasmic Antibody      <1:10 <1:10   Neutrophil Cytoplasmic Antibody Pattern       The ANCA IFA is <1:10.  No further testing will be performed.   AST      0 - 45 U/L 34   ALT      0 - 70 U/L 46   Albumin      3.4 - 5.0 g/dL 3.8   CRP Inflammation      0.0 - 8.0 mg/L <2.9   Sed Rate      0 - 15 mm/hr 10     Component      Latest Ref Rng & Units 2/1/2022   Color Urine      Colorless, Straw, Light Yellow, Yellow Yellow   Appearance Urine      Clear Clear   Glucose Urine      Negative mg/dL Negative   Bilirubin Urine      Negative Negative   Ketones Urine      Negative mg/dL Negative   Specific Gravity Urine      1.003 - 1.035 1.004   Blood Urine      Negative Negative   pH Urine      5.0 - 7.0 6.0   Protein Albumin Urine      Negative mg/dL Negative   Urobilinogen mg/dL      Normal, 2.0 mg/dL Normal   Nitrite Urine      Negative Negative   Leukocyte Esterase Urine      Negative Negative   RBC Urine      <=2 /HPF <1   WBC Urine      <=5 /HPF <1   MPO Zenaida IgG Instrument Value      <3.5 U/mL    Myeloperoxidase Antibody IgG      Negative    Proteinase 3 Zenaida IgG Instrument Value      <2.0 U/mL    Proteinase 3 Antibody IgG      Negative    Protein Random Urine      g/L <0.05   Protein Total Urine g/gr  Creatinine          Creatinine Urine      mg/dL 24   IGG      610-1,616 mg/dL 1,146   IGA      84 - 499 mg/dL 168   IGM      35 - 242 mg/dL 30 (L)   Creatinine      0.66 - 1.25 mg/dL 1.06   GFR Estimate      >60 mL/min/1.73m2 89   CD19 B Cells      6 - 27 % 7   Absolute CD19      107 - 698 cells/uL 116   Neutrophil Cytoplasmic Antibody      <1:10 1:160 (H)   Neutrophil Cytoplasmic Antibody Pattern       Perinuclear ANCA (p-ANCA) staining pattern observed and confirmed on formalin-fixed neutrophils.   AST      0 - 45 U/L 38   ALT      0 - 70 U/L 40   Albumin      3.4 - 5.0 g/dL 3.7   CRP Inflammation      0.0 - 8.0 mg/L 7.4   Sed Rate      0 - 15 mm/hr 13     Component      Latest Ref Rng & Units 2/1/2022   WBC      4.0 - 11.0 10e3/uL 5.5   RBC Count      4.40 - 5.90 10e6/uL 4.39 (L)   Hemoglobin      13.3 - 17.7 g/dL 14.9   Hematocrit      40.0 - 53.0 % 42.8   MCV      78 - 100 fL 98   MCH      26.5 - 33.0 pg 33.9 (H)   MCHC      31.5 - 36.5 g/dL 34.8   RDW      10.0 - 15.0 % 12.2   Platelet Count      150 - 450 10e3/uL 257   % Neutrophils      % 54   % Lymphocytes      % 25   % Monocytes      % 10   % Eosinophils      % 9   % Basophils      % 2   % Immature Granulocytes      % 0   NRBCs per 100 WBC      <1 /100 0   Absolute Neutrophils      1.6 - 8.3 10e3/uL 3.0   Absolute Lymphocytes      0.8 - 5.3 10e3/uL 1.3   Absolute Monocytes      0.0 - 1.3 10e3/uL 0.6   Absolute Eosinophils      0.0 - 0.7 10e3/uL 0.5   Absolute Basophils      0.0 - 0.2 10e3/uL 0.1   Absolute Immature Granulocytes      <=0.4 10e3/uL 0.0   Absolute NRBCs      10e3/uL 0.0   Color Urine      Colorless, Straw, Light Yellow, Yellow Yellow   Appearance Urine      Clear Clear   Glucose Urine      Negative mg/dL Negative   Bilirubin Urine      Negative Negative   Ketones Urine      Negative mg/dL Negative   Specific Gravity Urine      1.003 - 1.035 1.004   Blood Urine      Negative Negative   pH Urine      5.0 - 7.0 6.0   Protein Albumin Urine       Negative mg/dL Negative   Urobilinogen mg/dL      Normal, 2.0 mg/dL Normal   Nitrite Urine      Negative Negative   Leukocyte Esterase Urine      Negative Negative   RBC Urine      <=2 /HPF <1   WBC Urine      <=5 /HPF <1   MPO Zenaida IgG Instrument Value      <3.5 U/mL 102.0 (H)   Myeloperoxidase Antibody IgG      Negative Positive (A)   Proteinase 3 Zenaida IgG Instrument Value      <2.0 U/mL <1.0   Proteinase 3 Antibody IgG      Negative Negative   IGG      610-1,616 mg/dL 1,146   IGA      84 - 499 mg/dL 168   IGM      35 - 242 mg/dL 30 (L)   Protein Random Urine      g/L <0.05   Protein Total Urine g/gr Creatinine          Creatinine Urine      mg/dL 24   Neutrophil Cytoplasmic Antibody      <1:10 1:160 (H)   Neutrophil Cytoplasmic Antibody Pattern       Perinuclear ANCA (p-ANCA) staining pattern observed and confirmed on formalin-fixed neutrophils.   CD19 B Cells      6 - 27 % 7   Absolute CD19      107 - 698 cells/uL 116   Creatinine      0.66 - 1.25 mg/dL 1.06   GFR Estimate      >60 mL/min/1.73m2 89   Sed Rate      0 - 15 mm/hr 13   CRP Inflammation      0.0 - 8.0 mg/L 7.4   Albumin      3.4 - 5.0 g/dL 3.7   ALT      0 - 70 U/L 40   AST      0 - 45 U/L 38     Component      Latest Ref Rng & Units 9/12/2022   WBC      4.0 - 11.0 10e3/uL 5.0   RBC Count      4.40 - 5.90 10e6/uL 4.50   Hemoglobin      13.3 - 17.7 g/dL 15.0   Hematocrit      40.0 - 53.0 % 43.5   MCV      78 - 100 fL 97   MCH      26.5 - 33.0 pg 33.3 (H)   MCHC      31.5 - 36.5 g/dL 34.5   RDW      10.0 - 15.0 % 12.5   Platelet Count      150 - 450 10e3/uL 221   % Neutrophils      % 49   % Lymphocytes      % 36   % Monocytes      % 12   % Eosinophils      % 1   % Basophils      % 1   % Immature Granulocytes      % 1   NRBCs per 100 WBC      <1 /100 0   Absolute Neutrophils      1.6 - 8.3 10e3/uL 2.4   Absolute Lymphocytes      0.8 - 5.3 10e3/uL 1.8   Absolute Monocytes      0.0 - 1.3 10e3/uL 0.6   Absolute Eosinophils      0.0 - 0.7 10e3/uL 0.0  "  Absolute Basophils      0.0 - 0.2 10e3/uL 0.1   Absolute Immature Granulocytes      <=0.4 10e3/uL 0.0   Absolute NRBCs      10e3/uL 0.0   Color Urine      Colorless, Straw, Light Yellow, Yellow Yellow   Appearance Urine      Clear Clear   Glucose Urine      Negative mg/dL Negative   Bilirubin Urine      Negative Negative   Ketones Urine      Negative mg/dL Negative   Specific Gravity Urine      1.003 - 1.035 1.005   Blood Urine      Negative Negative   pH Urine      5.0 - 7.0 5.0   Protein Albumin Urine      Negative mg/dL Negative   Urobilinogen mg/dL      Normal, 2.0 mg/dL Normal   Nitrite Urine      Negative Negative   Leukocyte Esterase Urine      Negative Negative   RBC Urine      <=2 /HPF 0   WBC Urine      <=5 /HPF 0   MPO Zenaida IgG Instrument Value      <3.5 U/mL 8.0 (H)   Myeloperoxidase Antibody IgG      Negative Positive (A)   Proteinase 3 Zenaida IgG Instrument Value      <2.0 U/mL <1.0   Proteinase 3 Antibody IgG      Negative Negative   Total Protein Urine mg/dL      mg/dL <6.0   Total Protein UR MG/MG CR          Creatinine Urine      mg/dL 20.3   IGG      610 - 1,616 mg/dL 906   IGA      84 - 499 mg/dL 160   IGM      35 - 242 mg/dL 32 (L)   Creatinine      0.67 - 1.17 mg/dL 1.06   GFR Estimate      >60 mL/min/1.73m2 89   CD19 B Cells      6 - 27 % 8   Absolute CD19      107 - 698 cells/uL 151   Neutrophil Cytoplasmic Antibody      <1:10 <1:10   Neutrophil Cytoplasmic Antibody Pattern       The ANCA IFA is <1:10.  No further testing will be performed.   AST      10 - 50 U/L 36   ALT      10 - 50 U/L 35   Albumin      3.5 - 5.2 g/dL 4.5   CRP Inflammation      <5.00 mg/L <3.00   Sed Rate      0 - 15 mm/hr 6     Ph.E:    BP (!) 150/86   Pulse 66   Ht 1.727 m (5' 8\")   Wt 94.7 kg (208 lb 12.8 oz)   SpO2 97%   BMI 31.75 kg/m        Constitutional: WD/WN. Pleasant. In no acute distress.  HEENT: EOM intact, sclera anicteric, conj not injected. 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 Failed methotrexate at 6 tab qwk (did not tolerate higher dose of 8 tab qwk), off prednisone. On AZA since 8/2019 (NL TPMT) but was put on hold in 11/2019 given recurrent sinusitis, antibiotic tx, finally required another sinus surgery/polypectomy for the flare on 12/10/2019 which was successful but the goal is to prevent recurrence of polyps and control disease. He was put back on AZA 50 mg bid since end of 3/2020, he had a transient elevated LFTs before resuming AZA which self-resolved without any intervention. Labs in 3/2020 showed improvement of MPO even before resuming AZA.    8/20/2021: On AZA 50 mg bid since end of 3/2020. Tolerates it well. Reports no recurrence of nasal polyps. He will do follow up with ENT. Labs from 2/2021 are stable. His EGPA seems to be stable. Reports seasonal allergies at this time of the year, using inhalor. Nasal congestion got better by missing AZA x 3 days.    We discussed covid booster vaccine, no official recommendation for J&J receivers yet but most likely will need a booster being immunocompromised.    2/4/2022: Flaring with EGPA, sinus inflammation, increased vasculitis markers. On and off AZA due to sinus infections. Recommend to switch to nucala to control EGPA; risks were discussed. Will treat with prednisone taper.    8/18/2022: Unclear if sinus sx are due to vasculitis and if he fails nucala or not, will get ENT opinion and check labs.    Today 9/23/2022: Labs are stable, but clinically he continues to flare with sinusitis, nasal polyps, steroid responsive. Since he failed nucala, recommend to stop. Discussed next tx options cellcept versus rituximab. Agreed to try cellcept as safer option next; risks were discussed.    Today's plan:        Follow up with ENT    Medrol dose pack as  needed      Stop nucala      Start cellcept 500 mg twice a day      Labs in 4 weeks      Return in 3 months (video visit)        Orders Placed This Encounter   Procedures     AST     ALT     Myeloperoxidase and Proteinase 3 Panel     Albumin level     Creatinine     CRP inflammation     UA with Microscopic reflex to Culture     Protein  random urine     Creatinine random urine     Erythrocyte sedimentation rate auto     CD19 B Cell Count     ANCA IgG by IFA with Reflex to Titer     Immunoglobulins A G and M     CBC with Platelets & Differential           Isabella Manley MD

## 2022-09-23 NOTE — LETTER
Date:October 17, 2022      Patient was self referred, no letter generated. Do not send.        [facility] Health Information

## 2022-09-23 NOTE — PATIENT INSTRUCTIONS
Stop nucala      Start cellcept 500 mg twice a day      Labs in 4 weeks      Return in 3 months (video visit)

## 2022-09-23 NOTE — NURSING NOTE
"Chief Complaint   Patient presents with     RECHECK     Follow-up     BP (!) 150/86   Pulse 66   Ht 1.727 m (5' 8\")   Wt 94.7 kg (208 lb 12.8 oz)   SpO2 97%   BMI 31.75 kg/m      Chief Complaint   Patient presents with     RECHECK     Follow-up       "

## 2022-09-23 NOTE — LETTER
9/23/2022       RE: Mj Mason Jr.  [address]  [place] [address]     Dear Colleague,    Thank you for referring your patient, Mj Mason Jr., to the [facility]HEUM[facility]OLOG[facility] [place] at [facility]. Please see a copy of my visit note below.    Rheumatology F/U In Person Visit Note    Reason for visit: ANCA-vasculitis, EGPA      Date of initial visit: 8/8/2019    Last seen: 8/19/2022    DOS: 9/23/2022    HPI     Mj Mason Jr. 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    Last visit:    He is doing fairly well. No recurrence of nasal polyps on imuran. Last seen by ENT in 12/2020, has upcoming appointment. Reports having seasonal allergies at this time of the year, has been using his inhalers x past 2 weeks. Reports some nasal congestion, it was better when he ran out of AZA x 3 days early this week.    No joint pain or skin rash.    Had surgery for R shoulder rotator cuff full tear which was successful.    Had J&J covid vaccine on 4/8/2021, tolerated it well.      2/4/2022:       Had 2 sinusitis, went on 2 rounds of antibiotics. Had 3 different bacteria on the culture. Off antibiotic x 2 weeks, resumed AZA x past 2 weeks. Still gets PND and coughs a lot. ENT saw inflammation on exam with no infection, this was about 2.5 wk ago when he was recommended to resume AZA. Was off AZA from 9/2021 till two weeks ago.  1st and 2nd round of antibiotic, did not come off AZA, just with 3rd round. Has some SOB, uses inhalers more. Gets winded more with exercise.     8/19/2022: His nose gets stuffy on lying down. Saw ENT and was given prednisone and it helped some, but cough and congestion is coming back off prednisone. Took last prednisone last week. It was 40 mg every day max. Seasonal allergies are triggers. Sometimes it is harder to breathe when he is working out.       Today 9/23/2022; Lucio presents for follow up,  continues to have flares of sinusitis with nasal polyps despite being on nucala, which are steroid responsive.      He sleeps at the side, has block nostril, was better on medrol dose pack.    Medrol dose pack finished yesterday. Last antibiotic was a month ago. Medrol works better, prednisone does not help.    Uses inhaler to prevent asthma.    PMHx: EGPA    Family History   Problem Relation Age of Onset     Colon Cancer Maternal Grandmother      Stomach Cancer Paternal Grandmother      No Known Problems Mother      No Known Problems Father      No Known Problems Maternal Grandfather      No Known Problems Paternal Grandfather      No Known Problems Brother      No Known Problems Sister      No Known Problems Son      No Known Problems Daughter      No Known Problems Maternal Half-Brother      No Known Problems Maternal Half-Sister      No Known Problems Paternal Half-Brother      No Known Problems Paternal Half-Sister      No Known Problems Niece      No Known Problems Nephew      No Known Problems Cousin      No Known Problems Other      Cancer No family hx of      Diabetes No family hx of      Heart Disease No family hx of      Hypertension No family hx of      Cerebrovascular Disease No family hx of      Asthma No family hx of      Thyroid Disease No family hx of      Depression No family hx of      Mental Illness No family hx of      Substance Abuse No family hx of      Dementia No family hx of      Bleeding Disorder No family hx of      Congenital Anomalies No family hx of      Migraines No family hx of      Stomach Problem No family hx of      Muscular Disorder No family hx of      Osteoporosis No family hx of      Unknown/Adopted No family hx of      Social History     Socioeconomic History     Marital status:      Spouse name: Not on file     Number of children: 2     Years of education: Not on file     Highest education level: Not on file   Occupational History     Occupation:  manager    Tobacco Use     Smoking status: Never Smoker     Smokeless tobacco: Never Used   Substance and Sexual Activity     Alcohol use: Yes     Drug use: Never     Sexual activity: Not on file   Other Topics Concern     Not on file   Social History Narrative     Not on file     Social Determinants of Health     Financial Resource Strain: Not on file   Food Insecurity: Not on file   Transportation Needs: Not on file   Physical Activity: Not on file   Stress: Not on file   Social Connections: Not on file   Intimate Partner Violence: Not on file   Housing Stability: Not on file       No Known Allergies    Outpatient Encounter Medications as of 9/23/2022   Medication Sig Dispense Refill     albuterol (PROAIR HFA/PROVENTIL HFA/VENTOLIN HFA) 108 (90 Base) MCG/ACT inhaler as needed        budesonide (PULMICORT) 1 MG/2ML neb solution Take 1 mg by nebulization daily        levocetirizine (XYZAL) 5 MG tablet Take 5 mg by mouth every morning       montelukast (SINGULAIR) 10 MG tablet Take 10 mg by mouth At Bedtime        SYMBICORT 160-4.5 MCG/ACT Inhaler Inhale 2 puffs into the lungs 2 times daily        azaTHIOprine (IMURAN) 50 MG tablet TAKE 1 TABLET BY MOUTH TWICE DAILY (Patient not taking: Reported on 9/23/2022) 180 tablet 0     cetirizine (ZYRTEC) 10 MG tablet Take 10 mg by mouth daily  (Patient not taking: Reported on 9/23/2022)       clindamycin phosphate (EVOCLIN) 1 % external foam daily  (Patient not taking: Reported on 9/23/2022)       diclofenac (VOLTAREN) 1 % topical gel Apply 2 g topically 4 times daily as needed for moderate pain To use over R shoulder (Patient not taking: Reported on 9/23/2022) 100 g 1     FLOVENT  MCG/ACT inhaler Inhale 1 puff into the lungs daily  (Patient not taking: Reported on 9/23/2022)       mepolizumab (NUCALA) 100 MG/ML auto-injector Inject 3 mLs (300 mg) Subcutaneous every 28 days (The contents of 3 pens) (Patient not taking: Reported on 9/23/2022) 3 mL 1     methylPREDNISolone  (MEDROL DOSEPAK) 4 MG tablet therapy pack Take per package instructions. As needed for flare up (Patient not taking: Reported on 9/23/2022) 21 tablet 3     predniSONE (DELTASONE) 10 MG tablet 40-30-20-10 mg a day each for 3 days then stop (Patient not taking: Reported on 9/23/2022) 30 tablet 0     predniSONE (DELTASONE) 20 MG tablet 60 mg a day x 3 more days (including today) followed by 40 mg a day x 3 days, 20 mg a day x 3 days, 10 mg a day x 3 days then stop. (Patient not taking: Reported on 9/23/2022) 21 tablet 1     tretinoin (RETIN-A) 0.1 % external cream Apply topically as needed  (Patient not taking: Reported on 9/23/2022)       No facility-administered encounter medications on file as of 9/23/2022.           ROS:  A comprehensive ROS was done, positives are per HPI.        His records were reviewed.    4/2019: pos MPO, p-ANCA    7/2019: NL ESR/CRP    Component      Latest Ref Rng & Units 3/25/2020   WBC      4.0 - 11.0 10e9/L 3.8 (L)   RBC Count      4.4 - 5.9 10e12/L 4.53   Hemoglobin      13.3 - 17.7 g/dL 15.1   Hematocrit      40.0 - 53.0 % 45.3   MCV      78 - 100 fl 100   MCH      26.5 - 33.0 pg 33.3 (H)   MCHC      31.5 - 36.5 g/dL 33.3   RDW      10.0 - 15.0 % 13.0   Platelet Count      150 - 450 10e9/L 235   Diff Method       Automated Method   % Neutrophils      % 43.3   % Lymphocytes      % 41.0   % Monocytes      % 9.0   % Eosinophils      % 4.8   % Basophils      % 1.6   % Immature Granulocytes      % 0.3   Nucleated RBCs      0 /100 0   Absolute Neutrophil      1.6 - 8.3 10e9/L 1.6   Absolute Lymphocytes      0.8 - 5.3 10e9/L 1.5   Absolute Monocytes      0.0 - 1.3 10e9/L 0.3   Absolute Eosinophils      0.0 - 0.7 10e9/L 0.2   Absolute Basophils      0.0 - 0.2 10e9/L 0.1   Abs Immature Granulocytes      0 - 0.4 10e9/L 0.0   Absolute Nucleated RBC       0.0   Sodium      133 - 144 mmol/L 140   Potassium      3.4 - 5.3 mmol/L 3.6   Chloride      94 - 109 mmol/L 106   Carbon Dioxide      20 - 32  mmol/L 32   Anion Gap      3 - 14 mmol/L 2 (L)   Glucose      70 - 99 mg/dL 53 (L)   Urea Nitrogen      7 - 30 mg/dL 22   Creatinine      0.66 - 1.25 mg/dL 0.98   GFR Estimate      >60 mL/min/1.73:m2 >90   GFR Estimate If Black      >60 mL/min/1.73:m2 >90   Calcium      8.5 - 10.1 mg/dL 9.1   Bilirubin Total      0.2 - 1.3 mg/dL 0.6   Albumin      3.4 - 5.0 g/dL 4.4   Protein Total      6.8 - 8.8 g/dL 7.5   Alkaline Phosphatase      40 - 150 U/L 52   ALT      0 - 70 U/L 45   AST      0 - 45 U/L 28   Color Urine       Yellow   Appearance Urine       Clear   Glucose Urine      NEG:Negative mg/dL Negative   Bilirubin Urine      NEG:Negative Negative   Ketones Urine      NEG:Negative mg/dL Negative   Specific Gravity Urine      1.003 - 1.035 1.006   Blood Urine      NEG:Negative Negative   pH Urine      5.0 - 7.0 pH 7.0   Protein Albumin Urine      NEG:Negative mg/dL Negative   Urobilinogen mg/dL      0.0 - 2.0 mg/dL 0.0   Nitrite Urine      NEG:Negative Negative   Leukocyte Esterase Urine      NEG:Negative Negative   Source       Midstream Urine   WBC Urine      0 - 5 /HPF 0   RBC Urine      0 - 2 /HPF 0   Squamous Epithelial /HPF Urine      0 - 1 /HPF <1   Neutrophil Cytoplasmic Antibody      <1:10 titer <1:10   Neutrophil Cytoplasmic Antibody Pattern       The ANCA IFA is <1:10.  No further testing will be performed.   Protein Random Urine      g/L <0.05   Protein Total Urine g/gr Creatinine      0 - 0.2 g/g Cr Unable to calculate due to low value   Myeloperoxidase Antibody IgG      0.0 - 0.9 AI 4.7 (H)   Proteinase 3 Antibody IgG      0.0 - 0.9 AI <0.2   IGE      0 - 114 KIU/L 22   CRP Inflammation      0.0 - 8.0 mg/L <2.9   Sed Rate      0 - 15 mm/h 5   Creatinine Urine      mg/dL 24     Stable labs in 2/2021    Component      Latest Ref Rng & Units 11/16/2021   Color Urine      Colorless, Straw, Light Yellow, Yellow Yellow   Appearance Urine      Clear Clear   Glucose Urine      Negative mg/dL Negative   Bilirubin  Urine      Negative Negative   Ketones Urine      Negative mg/dL Negative   Specific Gravity Urine      1.003 - 1.035 1.009   Blood Urine      Negative Negative   pH Urine      5.0 - 7.0 6.0   Protein Albumin Urine      Negative mg/dL Negative   Urobilinogen mg/dL      Normal, 2.0 mg/dL Normal   Nitrite Urine      Negative Negative   Leukocyte Esterase Urine      Negative Negative   RBC Urine      <=2 /HPF <1   WBC Urine      <=5 /HPF 0   MPO Zenaida IgG Instrument Value      <3.5 U/mL 17.0 (H)   Myeloperoxidase Antibody IgG      Negative Positive (A)   Proteinase 3 Zenaida IgG Instrument Value      <2.0 U/mL <1.0   Proteinase 3 Antibody IgG      Negative Negative   Protein Random Urine      g/L <0.05   Protein Total Urine g/gr Creatinine          Creatinine Urine      mg/dL 37   IGG      610-1,616 mg/dL 1,053   IGA      84 - 499 mg/dL 178   IGM      35 - 242 mg/dL 28 (L)   Creatinine      0.66 - 1.25 mg/dL 0.92   GFR Estimate      >60 mL/min/1.73m2 >90   CD19 B Cells      6 - 27 % 7   Absolute CD19      107 - 698 cells/uL 92 (L)   Neutrophil Cytoplasmic Antibody      <1:10 <1:10   Neutrophil Cytoplasmic Antibody Pattern       The ANCA IFA is <1:10.  No further testing will be performed.   AST      0 - 45 U/L 34   ALT      0 - 70 U/L 46   Albumin      3.4 - 5.0 g/dL 3.8   CRP Inflammation      0.0 - 8.0 mg/L <2.9   Sed Rate      0 - 15 mm/hr 10     Component      Latest Ref Rng & Units 2/1/2022   Color Urine      Colorless, Straw, Light Yellow, Yellow Yellow   Appearance Urine      Clear Clear   Glucose Urine      Negative mg/dL Negative   Bilirubin Urine      Negative Negative   Ketones Urine      Negative mg/dL Negative   Specific Gravity Urine      1.003 - 1.035 1.004   Blood Urine      Negative Negative   pH Urine      5.0 - 7.0 6.0   Protein Albumin Urine      Negative mg/dL Negative   Urobilinogen mg/dL      Normal, 2.0 mg/dL Normal   Nitrite Urine      Negative Negative   Leukocyte Esterase Urine      Negative  Negative   RBC Urine      <=2 /HPF <1   WBC Urine      <=5 /HPF <1   MPO Zenaida IgG Instrument Value      <3.5 U/mL    Myeloperoxidase Antibody IgG      Negative    Proteinase 3 Zenaida IgG Instrument Value      <2.0 U/mL    Proteinase 3 Antibody IgG      Negative    Protein Random Urine      g/L <0.05   Protein Total Urine g/gr Creatinine          Creatinine Urine      mg/dL 24   IGG      610-1,616 mg/dL 1,146   IGA      84 - 499 mg/dL 168   IGM      35 - 242 mg/dL 30 (L)   Creatinine      0.66 - 1.25 mg/dL 1.06   GFR Estimate      >60 mL/min/1.73m2 89   CD19 B Cells      6 - 27 % 7   Absolute CD19      107 - 698 cells/uL 116   Neutrophil Cytoplasmic Antibody      <1:10 1:160 (H)   Neutrophil Cytoplasmic Antibody Pattern       Perinuclear ANCA (p-ANCA) staining pattern observed and confirmed on formalin-fixed neutrophils.   AST      0 - 45 U/L 38   ALT      0 - 70 U/L 40   Albumin      3.4 - 5.0 g/dL 3.7   CRP Inflammation      0.0 - 8.0 mg/L 7.4   Sed Rate      0 - 15 mm/hr 13     Component      Latest Ref Rng & Units 2/1/2022   WBC      4.0 - 11.0 10e3/uL 5.5   RBC Count      4.40 - 5.90 10e6/uL 4.39 (L)   Hemoglobin      13.3 - 17.7 g/dL 14.9   Hematocrit      40.0 - 53.0 % 42.8   MCV      78 - 100 fL 98   MCH      26.5 - 33.0 pg 33.9 (H)   MCHC      31.5 - 36.5 g/dL 34.8   RDW      10.0 - 15.0 % 12.2   Platelet Count      150 - 450 10e3/uL 257   % Neutrophils      % 54   % Lymphocytes      % 25   % Monocytes      % 10   % Eosinophils      % 9   % Basophils      % 2   % Immature Granulocytes      % 0   NRBCs per 100 WBC      <1 /100 0   Absolute Neutrophils      1.6 - 8.3 10e3/uL 3.0   Absolute Lymphocytes      0.8 - 5.3 10e3/uL 1.3   Absolute Monocytes      0.0 - 1.3 10e3/uL 0.6   Absolute Eosinophils      0.0 - 0.7 10e3/uL 0.5   Absolute Basophils      0.0 - 0.2 10e3/uL 0.1   Absolute Immature Granulocytes      <=0.4 10e3/uL 0.0   Absolute NRBCs      10e3/uL 0.0   Color Urine      Colorless, Straw, Light Yellow,  Yellow Yellow   Appearance Urine      Clear Clear   Glucose Urine      Negative mg/dL Negative   Bilirubin Urine      Negative Negative   Ketones Urine      Negative mg/dL Negative   Specific Gravity Urine      1.003 - 1.035 1.004   Blood Urine      Negative Negative   pH Urine      5.0 - 7.0 6.0   Protein Albumin Urine      Negative mg/dL Negative   Urobilinogen mg/dL      Normal, 2.0 mg/dL Normal   Nitrite Urine      Negative Negative   Leukocyte Esterase Urine      Negative Negative   RBC Urine      <=2 /HPF <1   WBC Urine      <=5 /HPF <1   MPO Zenaida IgG Instrument Value      <3.5 U/mL 102.0 (H)   Myeloperoxidase Antibody IgG      Negative Positive (A)   Proteinase 3 Zenaida IgG Instrument Value      <2.0 U/mL <1.0   Proteinase 3 Antibody IgG      Negative Negative   IGG      610-1,616 mg/dL 1,146   IGA      84 - 499 mg/dL 168   IGM      35 - 242 mg/dL 30 (L)   Protein Random Urine      g/L <0.05   Protein Total Urine g/gr Creatinine          Creatinine Urine      mg/dL 24   Neutrophil Cytoplasmic Antibody      <1:10 1:160 (H)   Neutrophil Cytoplasmic Antibody Pattern       Perinuclear ANCA (p-ANCA) staining pattern observed and confirmed on formalin-fixed neutrophils.   CD19 B Cells      6 - 27 % 7   Absolute CD19      107 - 698 cells/uL 116   Creatinine      0.66 - 1.25 mg/dL 1.06   GFR Estimate      >60 mL/min/1.73m2 89   Sed Rate      0 - 15 mm/hr 13   CRP Inflammation      0.0 - 8.0 mg/L 7.4   Albumin      3.4 - 5.0 g/dL 3.7   ALT      0 - 70 U/L 40   AST      0 - 45 U/L 38     Component      Latest Ref Rng & Units 9/12/2022   WBC      4.0 - 11.0 10e3/uL 5.0   RBC Count      4.40 - 5.90 10e6/uL 4.50   Hemoglobin      13.3 - 17.7 g/dL 15.0   Hematocrit      40.0 - 53.0 % 43.5   MCV      78 - 100 fL 97   MCH      26.5 - 33.0 pg 33.3 (H)   MCHC      31.5 - 36.5 g/dL 34.5   RDW      10.0 - 15.0 % 12.5   Platelet Count      150 - 450 10e3/uL 221   % Neutrophils      % 49   % Lymphocytes      % 36   % Monocytes      %  12   % Eosinophils      % 1   % Basophils      % 1   % Immature Granulocytes      % 1   NRBCs per 100 WBC      <1 /100 0   Absolute Neutrophils      1.6 - 8.3 10e3/uL 2.4   Absolute Lymphocytes      0.8 - 5.3 10e3/uL 1.8   Absolute Monocytes      0.0 - 1.3 10e3/uL 0.6   Absolute Eosinophils      0.0 - 0.7 10e3/uL 0.0   Absolute Basophils      0.0 - 0.2 10e3/uL 0.1   Absolute Immature Granulocytes      <=0.4 10e3/uL 0.0   Absolute NRBCs      10e3/uL 0.0   Color Urine      Colorless, Straw, Light Yellow, Yellow Yellow   Appearance Urine      Clear Clear   Glucose Urine      Negative mg/dL Negative   Bilirubin Urine      Negative Negative   Ketones Urine      Negative mg/dL Negative   Specific Gravity Urine      1.003 - 1.035 1.005   Blood Urine      Negative Negative   pH Urine      5.0 - 7.0 5.0   Protein Albumin Urine      Negative mg/dL Negative   Urobilinogen mg/dL      Normal, 2.0 mg/dL Normal   Nitrite Urine      Negative Negative   Leukocyte Esterase Urine      Negative Negative   RBC Urine      <=2 /HPF 0   WBC Urine      <=5 /HPF 0   MPO Zenaida IgG Instrument Value      <3.5 U/mL 8.0 (H)   Myeloperoxidase Antibody IgG      Negative Positive (A)   Proteinase 3 Zenaida IgG Instrument Value      <2.0 U/mL <1.0   Proteinase 3 Antibody IgG      Negative Negative   Total Protein Urine mg/dL      mg/dL <6.0   Total Protein UR MG/MG CR          Creatinine Urine      mg/dL 20.3   IGG      610 - 1,616 mg/dL 906   IGA      84 - 499 mg/dL 160   IGM      35 - 242 mg/dL 32 (L)   Creatinine      0.67 - 1.17 mg/dL 1.06   GFR Estimate      >60 mL/min/1.73m2 89   CD19 B Cells      6 - 27 % 8   Absolute CD19      107 - 698 cells/uL 151   Neutrophil Cytoplasmic Antibody      <1:10 <1:10   Neutrophil Cytoplasmic Antibody Pattern       The ANCA IFA is <1:10.  No further testing will be performed.   AST      10 - 50 U/L 36   ALT      10 - 50 U/L 35   Albumin      3.5 - 5.2 g/dL 4.5   CRP Inflammation      <5.00 mg/L <3.00   Sed Rate       "0 - 15 mm/hr 6     Ph.E:    BP (!) 150/86   Pulse 66   Ht 1.727 m (5' 8\")   Wt 94.7 kg (208 lb 12.8 oz)   SpO2 97%   BMI 31.75 kg/m        Constitutional: WD/WN. Pleasant. In no acute distress.  HEENT: EOM intact, sclera anicteric, conj not injected. 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 Failed methotrexate at 6 tab qwk (did not tolerate higher dose of 8 tab qwk), off prednisone. On AZA since 8/2019 (NL TPMT) but was put on hold in 11/2019 given recurrent sinusitis, antibiotic tx, finally required another sinus surgery/polypectomy for the flare on 12/10/2019 which was successful but the goal is to prevent recurrence of polyps and control disease. He was put back on AZA 50 mg bid since end of 3/2020, he had a transient elevated LFTs before resuming AZA which self-resolved without any intervention. Labs in 3/2020 showed improvement of MPO even before resuming AZA.    8/20/2021: On AZA 50 mg bid since end of 3/2020. Tolerates it well. Reports no recurrence of nasal polyps. He will do follow up with ENT. Labs from 2/2021 are stable. His EGPA seems to be stable. Reports seasonal allergies at this time of the year, using inhalor. Nasal congestion got better by missing AZA x 3 days.    We discussed covid booster vaccine, no official recommendation for J&J receivers yet but most likely will need a booster being immunocompromised.    2/4/2022: Flaring with EGPA, sinus inflammation, increased vasculitis markers. On and off AZA due to sinus infections. Recommend to switch to nucala to control EGPA; risks were discussed. Will treat with prednisone taper.    8/18/2022: Unclear if sinus sx are due to vasculitis and if he fails nucala or not, will get ENT opinion and check labs.    Today 9/23/2022: Labs are stable, but clinically he " continues to flare with sinusitis, nasal polyps, steroid responsive. Since he failed nucala, recommend to stop. Discussed next tx options cellcept versus rituximab. Agreed to try cellcept as safer option next; risks were discussed.    Today's plan:        Follow up with ENT    Medrol dose pack as needed      Stop nucala      Start cellcept 500 mg twice a day      Labs in 4 weeks      Return in 3 months (video visit)        Orders Placed This Encounter   Procedures     AST     ALT     Myeloperoxidase and Proteinase 3 Panel     Albumin level     Creatinine     CRP inflammation     UA with Microscopic reflex to Culture     Protein  random urine     Creatinine random urine     Erythrocyte sedimentation rate auto     CD19 B Cell Count     ANCA IgG by IFA with Reflex to Titer     Immunoglobulins A G and M     CBC with Platelets & Differential           Isabella Manley MD          Again, thank you for allowing me to participate in the care of your patient.      Sincerely,    Isabella Manley MD

## 2022-10-29 ENCOUNTER — HEALTH MAINTENANCE LETTER (OUTPATIENT)
Age: 42
End: 2022-10-29

## 2022-11-01 ENCOUNTER — LAB (OUTPATIENT)
Dept: LAB | Facility: CLINIC | Age: 42
End: 2022-11-01
Payer: COMMERCIAL

## 2022-11-01 DIAGNOSIS — Z51.81 MEDICATION MONITORING ENCOUNTER: ICD-10-CM

## 2022-11-01 DIAGNOSIS — D72.18 EOSINOPHILIC GRANULOMATOSIS WITH POLYANGIITIS (EGPA) (H): ICD-10-CM

## 2022-11-01 DIAGNOSIS — M30.1 EOSINOPHILIC GRANULOMATOSIS WITH POLYANGIITIS (EGPA) (H): ICD-10-CM

## 2022-11-01 LAB
ALBUMIN MFR UR ELPH: <6 MG/DL
ALBUMIN SERPL BCG-MCNC: 4.6 G/DL (ref 3.5–5.2)
ALBUMIN UR-MCNC: NEGATIVE MG/DL
ALT SERPL W P-5'-P-CCNC: 37 U/L (ref 10–50)
APPEARANCE UR: CLEAR
AST SERPL W P-5'-P-CCNC: 44 U/L (ref 10–50)
BASOPHILS # BLD AUTO: 0.1 10E3/UL (ref 0–0.2)
BASOPHILS NFR BLD AUTO: 1 %
BILIRUB UR QL STRIP: NEGATIVE
CD19 CELLS # BLD: 240 CELLS/UL (ref 107–698)
CD19 CELLS NFR BLD: 13 % (ref 6–27)
COLOR UR AUTO: NORMAL
CREAT SERPL-MCNC: 0.97 MG/DL (ref 0.67–1.17)
CREAT UR-MCNC: 36.7 MG/DL
CRP SERPL-MCNC: <3 MG/L
EOSINOPHIL # BLD AUTO: 0.1 10E3/UL (ref 0–0.7)
EOSINOPHIL NFR BLD AUTO: 2 %
ERYTHROCYTE [DISTWIDTH] IN BLOOD BY AUTOMATED COUNT: 12.5 % (ref 10–15)
ERYTHROCYTE [SEDIMENTATION RATE] IN BLOOD BY WESTERGREN METHOD: 6 MM/HR (ref 0–15)
GFR SERPL CREATININE-BSD FRML MDRD: >90 ML/MIN/1.73M2
GLUCOSE UR STRIP-MCNC: NEGATIVE MG/DL
HCT VFR BLD AUTO: 42.7 % (ref 40–53)
HGB BLD-MCNC: 14.8 G/DL (ref 13.3–17.7)
HGB UR QL STRIP: NEGATIVE
IMM GRANULOCYTES # BLD: 0 10E3/UL
IMM GRANULOCYTES NFR BLD: 0 %
KETONES UR STRIP-MCNC: NEGATIVE MG/DL
LEUKOCYTE ESTERASE UR QL STRIP: NEGATIVE
LYMPHOCYTES # BLD AUTO: 1.7 10E3/UL (ref 0.8–5.3)
LYMPHOCYTES NFR BLD AUTO: 26 %
MCH RBC QN AUTO: 33.3 PG (ref 26.5–33)
MCHC RBC AUTO-ENTMCNC: 34.7 G/DL (ref 31.5–36.5)
MCV RBC AUTO: 96 FL (ref 78–100)
MONOCYTES # BLD AUTO: 0.6 10E3/UL (ref 0–1.3)
MONOCYTES NFR BLD AUTO: 9 %
NEUTROPHILS # BLD AUTO: 4 10E3/UL (ref 1.6–8.3)
NEUTROPHILS NFR BLD AUTO: 62 %
NITRATE UR QL: NEGATIVE
NRBC # BLD AUTO: 0 10E3/UL
NRBC BLD AUTO-RTO: 0 /100
PH UR STRIP: 6 [PH] (ref 5–7)
PLATELET # BLD AUTO: 247 10E3/UL (ref 150–450)
PROT/CREAT 24H UR: NORMAL MG/G{CREAT}
RBC # BLD AUTO: 4.44 10E6/UL (ref 4.4–5.9)
RBC URINE: 0 /HPF
SP GR UR STRIP: 1.01 (ref 1–1.03)
UROBILINOGEN UR STRIP-MCNC: NORMAL MG/DL
WBC # BLD AUTO: 6.4 10E3/UL (ref 4–11)
WBC URINE: <1 /HPF

## 2022-11-01 PROCEDURE — 84460 ALANINE AMINO (ALT) (SGPT): CPT | Performed by: PATHOLOGY

## 2022-11-01 PROCEDURE — 86256 FLUORESCENT ANTIBODY TITER: CPT | Performed by: PATHOLOGY

## 2022-11-01 PROCEDURE — 85025 COMPLETE CBC W/AUTO DIFF WBC: CPT | Performed by: PATHOLOGY

## 2022-11-01 PROCEDURE — 86140 C-REACTIVE PROTEIN: CPT | Performed by: PATHOLOGY

## 2022-11-01 PROCEDURE — 83516 IMMUNOASSAY NONANTIBODY: CPT | Performed by: PATHOLOGY

## 2022-11-01 PROCEDURE — 82040 ASSAY OF SERUM ALBUMIN: CPT | Performed by: PATHOLOGY

## 2022-11-01 PROCEDURE — 84156 ASSAY OF PROTEIN URINE: CPT | Performed by: PATHOLOGY

## 2022-11-01 PROCEDURE — 99000 SPECIMEN HANDLING OFFICE-LAB: CPT | Performed by: PATHOLOGY

## 2022-11-01 PROCEDURE — 36415 COLL VENOUS BLD VENIPUNCTURE: CPT | Performed by: PATHOLOGY

## 2022-11-01 PROCEDURE — 84450 TRANSFERASE (AST) (SGOT): CPT | Performed by: PATHOLOGY

## 2022-11-01 PROCEDURE — 86355 B CELLS TOTAL COUNT: CPT | Performed by: PATHOLOGY

## 2022-11-01 PROCEDURE — 81001 URINALYSIS AUTO W/SCOPE: CPT | Performed by: PATHOLOGY

## 2022-11-01 PROCEDURE — 82565 ASSAY OF CREATININE: CPT | Performed by: PATHOLOGY

## 2022-11-01 PROCEDURE — 85652 RBC SED RATE AUTOMATED: CPT | Performed by: PATHOLOGY

## 2022-11-01 PROCEDURE — 82784 ASSAY IGA/IGD/IGG/IGM EACH: CPT | Performed by: PATHOLOGY

## 2022-11-01 PROCEDURE — 83876 ASSAY MYELOPEROXIDASE: CPT | Performed by: PATHOLOGY

## 2022-11-01 PROCEDURE — 86036 ANCA SCREEN EACH ANTIBODY: CPT | Performed by: PATHOLOGY

## 2022-11-02 LAB
ANCA AB PATTERN SER IF-IMP: NORMAL
C-ANCA TITR SER IF: NORMAL {TITER}
IGA SERPL-MCNC: 154 MG/DL (ref 84–499)
IGG SERPL-MCNC: 948 MG/DL (ref 610–1616)
IGM SERPL-MCNC: 27 MG/DL (ref 35–242)

## 2022-11-08 LAB
MYELOPEROXIDASE AB SER IA-ACNC: 1.5 U/ML
MYELOPEROXIDASE AB SER IA-ACNC: NEGATIVE
PROTEINASE3 AB SER IA-ACNC: <1 U/ML
PROTEINASE3 AB SER IA-ACNC: NEGATIVE

## 2022-11-30 ENCOUNTER — OFFICE VISIT (OUTPATIENT)
Dept: RHEUMATOLOGY | Facility: CLINIC | Age: 42
End: 2022-11-30
Attending: INTERNAL MEDICINE
Payer: COMMERCIAL

## 2022-11-30 VITALS
SYSTOLIC BLOOD PRESSURE: 137 MMHG | OXYGEN SATURATION: 96 % | HEART RATE: 63 BPM | WEIGHT: 202.6 LBS | DIASTOLIC BLOOD PRESSURE: 85 MMHG | BODY MASS INDEX: 30.81 KG/M2

## 2022-11-30 DIAGNOSIS — M30.1 EOSINOPHILIC GRANULOMATOSIS WITH POLYANGIITIS (EGPA) (H): ICD-10-CM

## 2022-11-30 DIAGNOSIS — D72.18 EOSINOPHILIC GRANULOMATOSIS WITH POLYANGIITIS (EGPA) (H): ICD-10-CM

## 2022-11-30 PROCEDURE — G0463 HOSPITAL OUTPT CLINIC VISIT: HCPCS

## 2022-11-30 PROCEDURE — 99214 OFFICE O/P EST MOD 30 MIN: CPT | Mod: GC | Performed by: INTERNAL MEDICINE

## 2022-11-30 RX ORDER — METHYLPREDNISOLONE 4 MG
TABLET, DOSE PACK ORAL
Qty: 21 TABLET | Refills: 3 | Status: SHIPPED | OUTPATIENT
Start: 2022-11-30

## 2022-11-30 NOTE — PROGRESS NOTES
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 He has previously failed methotrexate, azathioprine, Nucala and, most recently, MMF due to recurrent sinusitis and respiratory symptoms.     Specifically, methotrexate 8 tabs weekly was associated with recurrent URI with no improvement in symptoms on 6 tabs weekly. He also then transitioned to AZA in 8/2019 (NL TPMT) but was put on hold in 11/2019 given recurrent sinusitis and antibiotic tx, ultimately requiring sinus surgery/polypectomy on 12/10/2019. He retried AZA 50 mg bid on 3/2020 but had a transient elevated LFTs and improvement of MPO even before resuming AZA. He subsequently trialed Nucala on 2/2022 but discontinued on 9/2022 due to persistent sinusitis and symptoms flares. 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    At this time, the patient is still completing his antibiotic course for Moraxella sinusitis and would prefer to hold off on further immunosuppression. He does feel that his symptoms are controlled at present on antibiotics and Medrol dose pack. We discussed risks and benefits of not restarting immunosuppression, and he was agreeable to considering a retrial of MMF pending further discussion at follow-up in 3 months. Exam today was otherwise unremarkable, and labs were notable for unremarkable inflammatory markers and ANCA.     Today's Plan:    Continue antibiotics and follow-up per ENT    Medrol dose pack available as needed    Can retrial cellcept 500 mg twice a day pending  follow up discussion with patient    Return in 3 months (video visit)    Patient was seen and discussed with Rheumatology staff, Dr. Manley.     Allan Calles MD PGY2  Internal Medicine      Attending Note: I saw and evaluated the patient with Dr. Calles. I agree with the assessment and plan.    Isabella Manley MD    Prior Assessment/Plan for future reference:     8/20/2021: On AZA 50 mg bid since end of 3/2020. Tolerates it well. Reports no recurrence of nasal polyps. He will do follow up with ENT. Labs from 2/2021 are stable. His EGPA seems to be stable. Reports seasonal allergies at this time of the year, using inhalor. Nasal congestion got better by missing AZA x 3 days.    We discussed covid booster vaccine, no official recommendation for J&J receivers yet but most likely will need a booster being immunocompromised.    2/4/2022: Flaring with EGPA, sinus inflammation, increased vasculitis markers. On and off AZA due to sinus infections. Recommend to switch to nucala to control EGPA; risks were discussed. Will treat with prednisone taper.    8/18/2022: Unclear if sinus sx are due to vasculitis and if he fails nucala or not, will get ENT opinion and check labs.    9/23/2022: Labs are stable, but clinically he continues to flare with sinusitis, nasal polyps, steroid responsive. Since he failed nucala, recommend to stop. Discussed next tx options cellcept versus rituximab. Agreed to try cellcept as safer option next; risks were discussed.    Interval History 11/30/2022    Patient reports that he discontinued MMF 2 weeks ago due to worsening congestion, post nasal drip, cough and shortness of breath with subsequent ENT evaluation revealing nasal culture positive sinus infection on 11/11/22. He was started on an unspecified antibiotic on 11/16 and began PRN Medrol dose pack provided by Rheumatology staff with immediate improvement in symptoms. He now continues to have aforementioned symptoms but reduced in  severity.    No other acute complaints when seen today. Patient overall reports feeling that symptoms are controlled on antibiotics and steroids and would prefer to wait on restarting MMF or other immunosuppressive regimen at this time.     History of Presenting Illness    2/4/2022: Had 2 sinusitis, went on 2 rounds of antibiotics. Had 3 different bacteria on the culture. Off antibiotic x 2 weeks, resumed AZA x past 2 weeks. Still gets PND and coughs a lot. ENT saw inflammation on exam with no infection, this was about 2.5 wk ago when he was recommended to resume AZA. Was off AZA from 9/2021 till two weeks ago.  1st and 2nd round of antibiotic, did not come off AZA, just with 3rd round. Has some SOB, uses inhalers more. Gets winded more with exercise.     8/19/2022: His nose gets stuffy on lying down. Saw ENT and was given prednisone and it helped some, but cough and congestion is coming back off prednisone. Took last prednisone last week. It was 40 mg every day max. Seasonal allergies are triggers. Sometimes it is harder to breathe when he is working out.     9/23/2022: Lucio presents for follow up, continues to have flares of sinusitis with nasal polyps despite being on nucala, which are steroid responsive.    Mj Isabella Brown. 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    He is doing fairly well. No recurrence of nasal polyps on imuran. Last seen by ENT in 12/2020, has upcoming appointment. Reports having seasonal allergies at this time of the year, has been using his inhalers x past 2 weeks. Reports some nasal congestion, it was better when he ran out of AZA x 3 days early this week.    He sleeps at the side, has block nostril, was better on medrol dose pack.    Medrol dose pack finished yesterday. Last antibiotic was a month ago. Medrol works better, prednisone does not help.    Uses inhaler to prevent asthma.    No joint pain or skin rash.    Had surgery for R shoulder rotator cuff  full tear which was successful.    Had J&J covid vaccine on 4/8/2021, tolerated it well.    Family History   Problem Relation Age of Onset     Colon Cancer Maternal Grandmother      Stomach Cancer Paternal Grandmother      No Known Problems Mother      No Known Problems Father      No Known Problems Maternal Grandfather      No Known Problems Paternal Grandfather      No Known Problems Brother      No Known Problems Sister      No Known Problems Son      No Known Problems Daughter      No Known Problems Maternal Half-Brother      No Known Problems Maternal Half-Sister      No Known Problems Paternal Half-Brother      No Known Problems Paternal Half-Sister      No Known Problems Niece      No Known Problems Nephew      No Known Problems Cousin      No Known Problems Other      Cancer No family hx of      Diabetes No family hx of      Heart Disease No family hx of      Hypertension No family hx of      Cerebrovascular Disease No family hx of      Asthma No family hx of      Thyroid Disease No family hx of      Depression No family hx of      Mental Illness No family hx of      Substance Abuse No family hx of      Dementia No family hx of      Bleeding Disorder No family hx of      Congenital Anomalies No family hx of      Migraines No family hx of      Stomach Problem No family hx of      Muscular Disorder No family hx of      Osteoporosis No family hx of      Unknown/Adopted No family hx of      Social History     Socioeconomic History     Marital status:      Spouse name: Not on file     Number of children: 2     Years of education: Not on file     Highest education level: Not on file   Occupational History     Occupation:  manager   Tobacco Use     Smoking status: Never     Smokeless tobacco: Never   Substance and Sexual Activity     Alcohol use: Yes     Drug use: Never     Sexual activity: Not on file   Other Topics Concern     Not on file   Social History Narrative     Not on file     Social  Determinants of Health     Financial Resource Strain: Not on file   Food Insecurity: Not on file   Transportation Needs: Not on file   Physical Activity: Not on file   Stress: Not on file   Social Connections: Not on file   Intimate Partner Violence: Not on file   Housing Stability: Not on file       No Known Allergies    Outpatient Encounter Medications as of 11/30/2022   Medication Sig Dispense Refill     albuterol (PROAIR HFA/PROVENTIL HFA/VENTOLIN HFA) 108 (90 Base) MCG/ACT inhaler as needed        budesonide (PULMICORT) 1 MG/2ML neb solution Take 1 mg by nebulization daily        levocetirizine (XYZAL) 5 MG tablet Take 5 mg by mouth every morning       montelukast (SINGULAIR) 10 MG tablet Take 10 mg by mouth At Bedtime        SYMBICORT 160-4.5 MCG/ACT Inhaler Inhale 2 puffs into the lungs 2 times daily        cetirizine (ZYRTEC) 10 MG tablet Take 10 mg by mouth daily  (Patient not taking: Reported on 9/23/2022)       clindamycin phosphate (EVOCLIN) 1 % external foam daily  (Patient not taking: Reported on 9/23/2022)       diclofenac (VOLTAREN) 1 % topical gel Apply 2 g topically 4 times daily as needed for moderate pain To use over R shoulder (Patient not taking: Reported on 9/23/2022) 100 g 1     FLOVENT  MCG/ACT inhaler Inhale 1 puff into the lungs daily  (Patient not taking: Reported on 9/23/2022)       methylPREDNISolone (MEDROL DOSEPAK) 4 MG tablet therapy pack Take per package instructions. As needed for flare up (Patient not taking: Reported on 11/30/2022) 21 tablet 3     mycophenolate (GENERIC EQUIVALENT) 500 MG tablet Take 1 tablet (500 mg) by mouth 2 times daily (Patient not taking: Reported on 11/30/2022) 60 tablet 3     tretinoin (RETIN-A) 0.1 % external cream Apply topically as needed  (Patient not taking: Reported on 9/23/2022)       No facility-administered encounter medications on file as of 11/30/2022.       ROS:  A comprehensive ROS was done, positives are per HPI.    Ph.E:    /85    Pulse 63   Wt 91.9 kg (202 lb 9.6 oz)   SpO2 96%   BMI 30.81 kg/m        Constitutional: WD/WN. Pleasant. In no acute distress.  HEENT: EOM intact, sclera anicteric, conj not injected. No saddle nose deformity  Chest: CTAB  CV: no M/R/G, RRR  Abdomen: soft, NT  MS: No synovitis or joint tenderness   Skin: No skin rash  Neuro: A&O x 3  Psych: NL affect     His records were reviewed.    4/2019: pos MPO, p-ANCA    7/2019: NL ESR/CRP    Component      Latest Ref Rng & Units 3/25/2020   WBC      4.0 - 11.0 10e9/L 3.8 (L)   RBC Count      4.4 - 5.9 10e12/L 4.53   Hemoglobin      13.3 - 17.7 g/dL 15.1   Hematocrit      40.0 - 53.0 % 45.3   MCV      78 - 100 fl 100   MCH      26.5 - 33.0 pg 33.3 (H)   MCHC      31.5 - 36.5 g/dL 33.3   RDW      10.0 - 15.0 % 13.0   Platelet Count      150 - 450 10e9/L 235   Diff Method       Automated Method   % Neutrophils      % 43.3   % Lymphocytes      % 41.0   % Monocytes      % 9.0   % Eosinophils      % 4.8   % Basophils      % 1.6   % Immature Granulocytes      % 0.3   Nucleated RBCs      0 /100 0   Absolute Neutrophil      1.6 - 8.3 10e9/L 1.6   Absolute Lymphocytes      0.8 - 5.3 10e9/L 1.5   Absolute Monocytes      0.0 - 1.3 10e9/L 0.3   Absolute Eosinophils      0.0 - 0.7 10e9/L 0.2   Absolute Basophils      0.0 - 0.2 10e9/L 0.1   Abs Immature Granulocytes      0 - 0.4 10e9/L 0.0   Absolute Nucleated RBC       0.0   Sodium      133 - 144 mmol/L 140   Potassium      3.4 - 5.3 mmol/L 3.6   Chloride      94 - 109 mmol/L 106   Carbon Dioxide      20 - 32 mmol/L 32   Anion Gap      3 - 14 mmol/L 2 (L)   Glucose      70 - 99 mg/dL 53 (L)   Urea Nitrogen      7 - 30 mg/dL 22   Creatinine      0.66 - 1.25 mg/dL 0.98   GFR Estimate      >60 mL/min/1.73:m2 >90   GFR Estimate If Black      >60 mL/min/1.73:m2 >90   Calcium      8.5 - 10.1 mg/dL 9.1   Bilirubin Total      0.2 - 1.3 mg/dL 0.6   Albumin      3.4 - 5.0 g/dL 4.4   Protein Total      6.8 - 8.8 g/dL 7.5   Alkaline  Phosphatase      40 - 150 U/L 52   ALT      0 - 70 U/L 45   AST      0 - 45 U/L 28   Color Urine       Yellow   Appearance Urine       Clear   Glucose Urine      NEG:Negative mg/dL Negative   Bilirubin Urine      NEG:Negative Negative   Ketones Urine      NEG:Negative mg/dL Negative   Specific Gravity Urine      1.003 - 1.035 1.006   Blood Urine      NEG:Negative Negative   pH Urine      5.0 - 7.0 pH 7.0   Protein Albumin Urine      NEG:Negative mg/dL Negative   Urobilinogen mg/dL      0.0 - 2.0 mg/dL 0.0   Nitrite Urine      NEG:Negative Negative   Leukocyte Esterase Urine      NEG:Negative Negative   Source       Midstream Urine   WBC Urine      0 - 5 /HPF 0   RBC Urine      0 - 2 /HPF 0   Squamous Epithelial /HPF Urine      0 - 1 /HPF <1   Neutrophil Cytoplasmic Antibody      <1:10 titer <1:10   Neutrophil Cytoplasmic Antibody Pattern       The ANCA IFA is <1:10.  No further testing will be performed.   Protein Random Urine      g/L <0.05   Protein Total Urine g/gr Creatinine      0 - 0.2 g/g Cr Unable to calculate due to low value   Myeloperoxidase Antibody IgG      0.0 - 0.9 AI 4.7 (H)   Proteinase 3 Antibody IgG      0.0 - 0.9 AI <0.2   IGE      0 - 114 KIU/L 22   CRP Inflammation      0.0 - 8.0 mg/L <2.9   Sed Rate      0 - 15 mm/h 5   Creatinine Urine      mg/dL 24     Stable labs in 2/2021    Component      Latest Ref Rng & Units 11/16/2021   Color Urine      Colorless, Straw, Light Yellow, Yellow Yellow   Appearance Urine      Clear Clear   Glucose Urine      Negative mg/dL Negative   Bilirubin Urine      Negative Negative   Ketones Urine      Negative mg/dL Negative   Specific Gravity Urine      1.003 - 1.035 1.009   Blood Urine      Negative Negative   pH Urine      5.0 - 7.0 6.0   Protein Albumin Urine      Negative mg/dL Negative   Urobilinogen mg/dL      Normal, 2.0 mg/dL Normal   Nitrite Urine      Negative Negative   Leukocyte Esterase Urine      Negative Negative   RBC Urine      <=2 /HPF <1   WBC  Urine      <=5 /HPF 0   MPO Zenaida IgG Instrument Value      <3.5 U/mL 17.0 (H)   Myeloperoxidase Antibody IgG      Negative Positive (A)   Proteinase 3 Zenaida IgG Instrument Value      <2.0 U/mL <1.0   Proteinase 3 Antibody IgG      Negative Negative   Protein Random Urine      g/L <0.05   Protein Total Urine g/gr Creatinine          Creatinine Urine      mg/dL 37   IGG      610-1,616 mg/dL 1,053   IGA      84 - 499 mg/dL 178   IGM      35 - 242 mg/dL 28 (L)   Creatinine      0.66 - 1.25 mg/dL 0.92   GFR Estimate      >60 mL/min/1.73m2 >90   CD19 B Cells      6 - 27 % 7   Absolute CD19      107 - 698 cells/uL 92 (L)   Neutrophil Cytoplasmic Antibody      <1:10 <1:10   Neutrophil Cytoplasmic Antibody Pattern       The ANCA IFA is <1:10.  No further testing will be performed.   AST      0 - 45 U/L 34   ALT      0 - 70 U/L 46   Albumin      3.4 - 5.0 g/dL 3.8   CRP Inflammation      0.0 - 8.0 mg/L <2.9   Sed Rate      0 - 15 mm/hr 10     Component      Latest Ref Rng & Units 2/1/2022   Color Urine      Colorless, Straw, Light Yellow, Yellow Yellow   Appearance Urine      Clear Clear   Glucose Urine      Negative mg/dL Negative   Bilirubin Urine      Negative Negative   Ketones Urine      Negative mg/dL Negative   Specific Gravity Urine      1.003 - 1.035 1.004   Blood Urine      Negative Negative   pH Urine      5.0 - 7.0 6.0   Protein Albumin Urine      Negative mg/dL Negative   Urobilinogen mg/dL      Normal, 2.0 mg/dL Normal   Nitrite Urine      Negative Negative   Leukocyte Esterase Urine      Negative Negative   RBC Urine      <=2 /HPF <1   WBC Urine      <=5 /HPF <1   MPO Zenaida IgG Instrument Value      <3.5 U/mL    Myeloperoxidase Antibody IgG      Negative    Proteinase 3 Zenaida IgG Instrument Value      <2.0 U/mL    Proteinase 3 Antibody IgG      Negative    Protein Random Urine      g/L <0.05   Protein Total Urine g/gr Creatinine          Creatinine Urine      mg/dL 24   IGG      610-1,616 mg/dL 1,146   IGA      84 -  499 mg/dL 168   IGM      35 - 242 mg/dL 30 (L)   Creatinine      0.66 - 1.25 mg/dL 1.06   GFR Estimate      >60 mL/min/1.73m2 89   CD19 B Cells      6 - 27 % 7   Absolute CD19      107 - 698 cells/uL 116   Neutrophil Cytoplasmic Antibody      <1:10 1:160 (H)   Neutrophil Cytoplasmic Antibody Pattern       Perinuclear ANCA (p-ANCA) staining pattern observed and confirmed on formalin-fixed neutrophils.   AST      0 - 45 U/L 38   ALT      0 - 70 U/L 40   Albumin      3.4 - 5.0 g/dL 3.7   CRP Inflammation      0.0 - 8.0 mg/L 7.4   Sed Rate      0 - 15 mm/hr 13     Component      Latest Ref Rng & Units 2/1/2022   WBC      4.0 - 11.0 10e3/uL 5.5   RBC Count      4.40 - 5.90 10e6/uL 4.39 (L)   Hemoglobin      13.3 - 17.7 g/dL 14.9   Hematocrit      40.0 - 53.0 % 42.8   MCV      78 - 100 fL 98   MCH      26.5 - 33.0 pg 33.9 (H)   MCHC      31.5 - 36.5 g/dL 34.8   RDW      10.0 - 15.0 % 12.2   Platelet Count      150 - 450 10e3/uL 257   % Neutrophils      % 54   % Lymphocytes      % 25   % Monocytes      % 10   % Eosinophils      % 9   % Basophils      % 2   % Immature Granulocytes      % 0   NRBCs per 100 WBC      <1 /100 0   Absolute Neutrophils      1.6 - 8.3 10e3/uL 3.0   Absolute Lymphocytes      0.8 - 5.3 10e3/uL 1.3   Absolute Monocytes      0.0 - 1.3 10e3/uL 0.6   Absolute Eosinophils      0.0 - 0.7 10e3/uL 0.5   Absolute Basophils      0.0 - 0.2 10e3/uL 0.1   Absolute Immature Granulocytes      <=0.4 10e3/uL 0.0   Absolute NRBCs      10e3/uL 0.0   Color Urine      Colorless, Straw, Light Yellow, Yellow Yellow   Appearance Urine      Clear Clear   Glucose Urine      Negative mg/dL Negative   Bilirubin Urine      Negative Negative   Ketones Urine      Negative mg/dL Negative   Specific Gravity Urine      1.003 - 1.035 1.004   Blood Urine      Negative Negative   pH Urine      5.0 - 7.0 6.0   Protein Albumin Urine      Negative mg/dL Negative   Urobilinogen mg/dL      Normal, 2.0 mg/dL Normal   Nitrite Urine       Negative Negative   Leukocyte Esterase Urine      Negative Negative   RBC Urine      <=2 /HPF <1   WBC Urine      <=5 /HPF <1   MPO Zenaida IgG Instrument Value      <3.5 U/mL 102.0 (H)   Myeloperoxidase Antibody IgG      Negative Positive (A)   Proteinase 3 Zenaida IgG Instrument Value      <2.0 U/mL <1.0   Proteinase 3 Antibody IgG      Negative Negative   IGG      610-1,616 mg/dL 1,146   IGA      84 - 499 mg/dL 168   IGM      35 - 242 mg/dL 30 (L)   Protein Random Urine      g/L <0.05   Protein Total Urine g/gr Creatinine          Creatinine Urine      mg/dL 24   Neutrophil Cytoplasmic Antibody      <1:10 1:160 (H)   Neutrophil Cytoplasmic Antibody Pattern       Perinuclear ANCA (p-ANCA) staining pattern observed and confirmed on formalin-fixed neutrophils.   CD19 B Cells      6 - 27 % 7   Absolute CD19      107 - 698 cells/uL 116   Creatinine      0.66 - 1.25 mg/dL 1.06   GFR Estimate      >60 mL/min/1.73m2 89   Sed Rate      0 - 15 mm/hr 13   CRP Inflammation      0.0 - 8.0 mg/L 7.4   Albumin      3.4 - 5.0 g/dL 3.7   ALT      0 - 70 U/L 40   AST      0 - 45 U/L 38     Component      Latest Ref Rng & Units 9/12/2022   WBC      4.0 - 11.0 10e3/uL 5.0   RBC Count      4.40 - 5.90 10e6/uL 4.50   Hemoglobin      13.3 - 17.7 g/dL 15.0   Hematocrit      40.0 - 53.0 % 43.5   MCV      78 - 100 fL 97   MCH      26.5 - 33.0 pg 33.3 (H)   MCHC      31.5 - 36.5 g/dL 34.5   RDW      10.0 - 15.0 % 12.5   Platelet Count      150 - 450 10e3/uL 221   % Neutrophils      % 49   % Lymphocytes      % 36   % Monocytes      % 12   % Eosinophils      % 1   % Basophils      % 1   % Immature Granulocytes      % 1   NRBCs per 100 WBC      <1 /100 0   Absolute Neutrophils      1.6 - 8.3 10e3/uL 2.4   Absolute Lymphocytes      0.8 - 5.3 10e3/uL 1.8   Absolute Monocytes      0.0 - 1.3 10e3/uL 0.6   Absolute Eosinophils      0.0 - 0.7 10e3/uL 0.0   Absolute Basophils      0.0 - 0.2 10e3/uL 0.1   Absolute Immature Granulocytes      <=0.4 10e3/uL 0.0    Absolute NRBCs      10e3/uL 0.0   Color Urine      Colorless, Straw, Light Yellow, Yellow Yellow   Appearance Urine      Clear Clear   Glucose Urine      Negative mg/dL Negative   Bilirubin Urine      Negative Negative   Ketones Urine      Negative mg/dL Negative   Specific Gravity Urine      1.003 - 1.035 1.005   Blood Urine      Negative Negative   pH Urine      5.0 - 7.0 5.0   Protein Albumin Urine      Negative mg/dL Negative   Urobilinogen mg/dL      Normal, 2.0 mg/dL Normal   Nitrite Urine      Negative Negative   Leukocyte Esterase Urine      Negative Negative   RBC Urine      <=2 /HPF 0   WBC Urine      <=5 /HPF 0   MPO Zenaida IgG Instrument Value      <3.5 U/mL 8.0 (H)   Myeloperoxidase Antibody IgG      Negative Positive (A)   Proteinase 3 Zenaida IgG Instrument Value      <2.0 U/mL <1.0   Proteinase 3 Antibody IgG      Negative Negative   Total Protein Urine mg/dL      mg/dL <6.0   Total Protein UR MG/MG CR          Creatinine Urine      mg/dL 20.3   IGG      610 - 1,616 mg/dL 906   IGA      84 - 499 mg/dL 160   IGM      35 - 242 mg/dL 32 (L)   Creatinine      0.67 - 1.17 mg/dL 1.06   GFR Estimate      >60 mL/min/1.73m2 89   CD19 B Cells      6 - 27 % 8   Absolute CD19      107 - 698 cells/uL 151   Neutrophil Cytoplasmic Antibody      <1:10 <1:10   Neutrophil Cytoplasmic Antibody Pattern       The ANCA IFA is <1:10.  No further testing will be performed.   AST      10 - 50 U/L 36   ALT      10 - 50 U/L 35   Albumin      3.5 - 5.2 g/dL 4.5   CRP Inflammation      <5.00 mg/L <3.00   Sed Rate      0 - 15 mm/hr 6

## 2022-11-30 NOTE — NURSING NOTE
Chief Complaint   Patient presents with     RECHECK     F/u     /85   Pulse 63   Wt 91.9 kg (202 lb 9.6 oz)   SpO2 96%   BMI 30.81 kg/m        Maribel White MA

## 2022-11-30 NOTE — LETTER
11/30/2022       RE: Mj Mason .  [address]  [place] [address]     Dear Colleague,    Thank you for referring your patient, Mj Mason Jr., to the [facility]. Please see a copy of my visit note below.    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 He has previously failed methotrexate, azathioprine, Nucala and, most recently, MMF due to recurrent sinusitis and respiratory symptoms.     Specifically, methotrexate 8 tabs weekly was associated with recurrent URI with no improvement in symptoms on 6 tabs weekly. He also then transitioned to AZA in 8/2019 (NL TPMT) but was put on hold in 11/2019 given recurrent sinusitis and antibiotic tx, ultimately requiring sinus surgery/polypectomy on 12/10/2019. He retried AZA 50 mg bid on 3/2020 but had a transient elevated LFTs and improvement of MPO even before resuming AZA. He subsequently trialed Nucala on 2/2022 but discontinued on 9/2022 due to persistent sinusitis and symptoms flares. 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    At this time, the patient is still completing his antibiotic course for Moraxella sinusitis and would prefer to hold off on further immunosuppression. He does feel that his symptoms are controlled at present on antibiotics and Medrol dose pack. We discussed risks and benefits of not restarting immunosuppression, and he was agreeable to considering a retrial of  MMF pending further discussion at follow-up in 3 months. Exam today was otherwise unremarkable, and labs were notable for unremarkable inflammatory markers and ANCA.     Today's Plan:    Continue antibiotics and follow-up per ENT    Medrol dose pack available as needed    Can retrial cellcept 500 mg twice a day pending follow up discussion with patient    Return in 3 months (video visit)    Patient was seen and discussed with Rheumatology staff, Dr. Manley.     Allan Calles MD PGY2  Internal Medicine      Attending Note: I saw and evaluated the patient with Dr. Calles. I agree with the assessment and plan.    Isabella Manley MD    Prior Assessment/Plan for future reference:     8/20/2021: On AZA 50 mg bid since end of 3/2020. Tolerates it well. Reports no recurrence of nasal polyps. He will do follow up with ENT. Labs from 2/2021 are stable. His EGPA seems to be stable. Reports seasonal allergies at this time of the year, using inhalor. Nasal congestion got better by missing AZA x 3 days.    We discussed covid booster vaccine, no official recommendation for J&J receivers yet but most likely will need a booster being immunocompromised.    2/4/2022: Flaring with EGPA, sinus inflammation, increased vasculitis markers. On and off AZA due to sinus infections. Recommend to switch to nucala to control EGPA; risks were discussed. Will treat with prednisone taper.    8/18/2022: Unclear if sinus sx are due to vasculitis and if he fails nucala or not, will get ENT opinion and check labs.    9/23/2022: Labs are stable, but clinically he continues to flare with sinusitis, nasal polyps, steroid responsive. Since he failed nucala, recommend to stop. Discussed next tx options cellcept versus rituximab. Agreed to try cellcept as safer option next; risks were discussed.    Interval History 11/30/2022    Patient reports that he discontinued MMF 2 weeks ago due to worsening congestion, post nasal drip, cough and shortness of breath  with subsequent ENT evaluation revealing nasal culture positive sinus infection on 11/11/22. He was started on an unspecified antibiotic on 11/16 and began PRN Medrol dose pack provided by Rheumatology staff with immediate improvement in symptoms. He now continues to have aforementioned symptoms but reduced in severity.    No other acute complaints when seen today. Patient overall reports feeling that symptoms are controlled on antibiotics and steroids and would prefer to wait on restarting MMF or other immunosuppressive regimen at this time.     History of Presenting Illness    2/4/2022: Had 2 sinusitis, went on 2 rounds of antibiotics. Had 3 different bacteria on the culture. Off antibiotic x 2 weeks, resumed AZA x past 2 weeks. Still gets PND and coughs a lot. ENT saw inflammation on exam with no infection, this was about 2.5 wk ago when he was recommended to resume AZA. Was off AZA from 9/2021 till two weeks ago.  1st and 2nd round of antibiotic, did not come off AZA, just with 3rd round. Has some SOB, uses inhalers more. Gets winded more with exercise.     8/19/2022: His nose gets stuffy on lying down. Saw ENT and was given prednisone and it helped some, but cough and congestion is coming back off prednisone. Took last prednisone last week. It was 40 mg every day max. Seasonal allergies are triggers. Sometimes it is harder to breathe when he is working out.     9/23/2022: Lucio presents for follow up, continues to have flares of sinusitis with nasal polyps despite being on nucala, which are steroid responsive.    Mj Mason Jr. 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    He is doing fairly well. No recurrence of nasal polyps on imuran. Last seen by ENT in 12/2020, has upcoming appointment. Reports having seasonal allergies at this time of the year, has been using his inhalers x past 2 weeks. Reports some nasal congestion, it was better when he ran out of AZA x 3 days early this  week.    He sleeps at the side, has block nostril, was better on medrol dose pack.    Medrol dose pack finished yesterday. Last antibiotic was a month ago. Medrol works better, prednisone does not help.    Uses inhaler to prevent asthma.    No joint pain or skin rash.    Had surgery for R shoulder rotator cuff full tear which was successful.    Had J&J covid vaccine on 4/8/2021, tolerated it well.    Family History   Problem Relation Age of Onset     Colon Cancer Maternal Grandmother      Stomach Cancer Paternal Grandmother      No Known Problems Mother      No Known Problems Father      No Known Problems Maternal Grandfather      No Known Problems Paternal Grandfather      No Known Problems Brother      No Known Problems Sister      No Known Problems Son      No Known Problems Daughter      No Known Problems Maternal Half-Brother      No Known Problems Maternal Half-Sister      No Known Problems Paternal Half-Brother      No Known Problems Paternal Half-Sister      No Known Problems Niece      No Known Problems Nephew      No Known Problems Cousin      No Known Problems Other      Cancer No family hx of      Diabetes No family hx of      Heart Disease No family hx of      Hypertension No family hx of      Cerebrovascular Disease No family hx of      Asthma No family hx of      Thyroid Disease No family hx of      Depression No family hx of      Mental Illness No family hx of      Substance Abuse No family hx of      Dementia No family hx of      Bleeding Disorder No family hx of      Congenital Anomalies No family hx of      Migraines No family hx of      Stomach Problem No family hx of      Muscular Disorder No family hx of      Osteoporosis No family hx of      Unknown/Adopted No family hx of      Social History     Socioeconomic History     Marital status:      Spouse name: Not on file     Number of children: 2     Years of education: Not on file     Highest education level: Not on file   Occupational  History     Occupation:  manager   Tobacco Use     Smoking status: Never     Smokeless tobacco: Never   Substance and Sexual Activity     Alcohol use: Yes     Drug use: Never     Sexual activity: Not on file   Other Topics Concern     Not on file   Social History Narrative     Not on file     Social Determinants of Health     Financial Resource Strain: Not on file   Food Insecurity: Not on file   Transportation Needs: Not on file   Physical Activity: Not on file   Stress: Not on file   Social Connections: Not on file   Intimate Partner Violence: Not on file   Housing Stability: Not on file       No Known Allergies    Outpatient Encounter Medications as of 11/30/2022   Medication Sig Dispense Refill     albuterol (PROAIR HFA/PROVENTIL HFA/VENTOLIN HFA) 108 (90 Base) MCG/ACT inhaler as needed        budesonide (PULMICORT) 1 MG/2ML neb solution Take 1 mg by nebulization daily        levocetirizine (XYZAL) 5 MG tablet Take 5 mg by mouth every morning       montelukast (SINGULAIR) 10 MG tablet Take 10 mg by mouth At Bedtime        SYMBICORT 160-4.5 MCG/ACT Inhaler Inhale 2 puffs into the lungs 2 times daily        cetirizine (ZYRTEC) 10 MG tablet Take 10 mg by mouth daily  (Patient not taking: Reported on 9/23/2022)       clindamycin phosphate (EVOCLIN) 1 % external foam daily  (Patient not taking: Reported on 9/23/2022)       diclofenac (VOLTAREN) 1 % topical gel Apply 2 g topically 4 times daily as needed for moderate pain To use over R shoulder (Patient not taking: Reported on 9/23/2022) 100 g 1     FLOVENT  MCG/ACT inhaler Inhale 1 puff into the lungs daily  (Patient not taking: Reported on 9/23/2022)       methylPREDNISolone (MEDROL DOSEPAK) 4 MG tablet therapy pack Take per package instructions. As needed for flare up (Patient not taking: Reported on 11/30/2022) 21 tablet 3     mycophenolate (GENERIC EQUIVALENT) 500 MG tablet Take 1 tablet (500 mg) by mouth 2 times daily (Patient not taking: Reported  on 11/30/2022) 60 tablet 3     tretinoin (RETIN-A) 0.1 % external cream Apply topically as needed  (Patient not taking: Reported on 9/23/2022)       No facility-administered encounter medications on file as of 11/30/2022.       ROS:  A comprehensive ROS was done, positives are per HPI.    Ph.E:    /85   Pulse 63   Wt 91.9 kg (202 lb 9.6 oz)   SpO2 96%   BMI 30.81 kg/m        Constitutional: WD/WN. Pleasant. In no acute distress.  HEENT: EOM intact, sclera anicteric, conj not injected. No saddle nose deformity  Chest: CTAB  CV: no M/R/G, RRR  Abdomen: soft, NT  MS: No synovitis or joint tenderness   Skin: No skin rash  Neuro: A&O x 3  Psych: NL affect     His records were reviewed.    4/2019: pos MPO, p-ANCA    7/2019: NL ESR/CRP    Component      Latest Ref Rng & Units 3/25/2020   WBC      4.0 - 11.0 10e9/L 3.8 (L)   RBC Count      4.4 - 5.9 10e12/L 4.53   Hemoglobin      13.3 - 17.7 g/dL 15.1   Hematocrit      40.0 - 53.0 % 45.3   MCV      78 - 100 fl 100   MCH      26.5 - 33.0 pg 33.3 (H)   MCHC      31.5 - 36.5 g/dL 33.3   RDW      10.0 - 15.0 % 13.0   Platelet Count      150 - 450 10e9/L 235   Diff Method       Automated Method   % Neutrophils      % 43.3   % Lymphocytes      % 41.0   % Monocytes      % 9.0   % Eosinophils      % 4.8   % Basophils      % 1.6   % Immature Granulocytes      % 0.3   Nucleated RBCs      0 /100 0   Absolute Neutrophil      1.6 - 8.3 10e9/L 1.6   Absolute Lymphocytes      0.8 - 5.3 10e9/L 1.5   Absolute Monocytes      0.0 - 1.3 10e9/L 0.3   Absolute Eosinophils      0.0 - 0.7 10e9/L 0.2   Absolute Basophils      0.0 - 0.2 10e9/L 0.1   Abs Immature Granulocytes      0 - 0.4 10e9/L 0.0   Absolute Nucleated RBC       0.0   Sodium      133 - 144 mmol/L 140   Potassium      3.4 - 5.3 mmol/L 3.6   Chloride      94 - 109 mmol/L 106   Carbon Dioxide      20 - 32 mmol/L 32   Anion Gap      3 - 14 mmol/L 2 (L)   Glucose      70 - 99 mg/dL 53 (L)   Urea Nitrogen      7 - 30 mg/dL 22    Creatinine      0.66 - 1.25 mg/dL 0.98   GFR Estimate      >60 mL/min/1.73:m2 >90   GFR Estimate If Black      >60 mL/min/1.73:m2 >90   Calcium      8.5 - 10.1 mg/dL 9.1   Bilirubin Total      0.2 - 1.3 mg/dL 0.6   Albumin      3.4 - 5.0 g/dL 4.4   Protein Total      6.8 - 8.8 g/dL 7.5   Alkaline Phosphatase      40 - 150 U/L 52   ALT      0 - 70 U/L 45   AST      0 - 45 U/L 28   Color Urine       Yellow   Appearance Urine       Clear   Glucose Urine      NEG:Negative mg/dL Negative   Bilirubin Urine      NEG:Negative Negative   Ketones Urine      NEG:Negative mg/dL Negative   Specific Gravity Urine      1.003 - 1.035 1.006   Blood Urine      NEG:Negative Negative   pH Urine      5.0 - 7.0 pH 7.0   Protein Albumin Urine      NEG:Negative mg/dL Negative   Urobilinogen mg/dL      0.0 - 2.0 mg/dL 0.0   Nitrite Urine      NEG:Negative Negative   Leukocyte Esterase Urine      NEG:Negative Negative   Source       Midstream Urine   WBC Urine      0 - 5 /HPF 0   RBC Urine      0 - 2 /HPF 0   Squamous Epithelial /HPF Urine      0 - 1 /HPF <1   Neutrophil Cytoplasmic Antibody      <1:10 titer <1:10   Neutrophil Cytoplasmic Antibody Pattern       The ANCA IFA is <1:10.  No further testing will be performed.   Protein Random Urine      g/L <0.05   Protein Total Urine g/gr Creatinine      0 - 0.2 g/g Cr Unable to calculate due to low value   Myeloperoxidase Antibody IgG      0.0 - 0.9 AI 4.7 (H)   Proteinase 3 Antibody IgG      0.0 - 0.9 AI <0.2   IGE      0 - 114 KIU/L 22   CRP Inflammation      0.0 - 8.0 mg/L <2.9   Sed Rate      0 - 15 mm/h 5   Creatinine Urine      mg/dL 24     Stable labs in 2/2021    Component      Latest Ref Rng & Units 11/16/2021   Color Urine      Colorless, Straw, Light Yellow, Yellow Yellow   Appearance Urine      Clear Clear   Glucose Urine      Negative mg/dL Negative   Bilirubin Urine      Negative Negative   Ketones Urine      Negative mg/dL Negative   Specific Gravity Urine      1.003 - 1.035  1.009   Blood Urine      Negative Negative   pH Urine      5.0 - 7.0 6.0   Protein Albumin Urine      Negative mg/dL Negative   Urobilinogen mg/dL      Normal, 2.0 mg/dL Normal   Nitrite Urine      Negative Negative   Leukocyte Esterase Urine      Negative Negative   RBC Urine      <=2 /HPF <1   WBC Urine      <=5 /HPF 0   MPO Zenaida IgG Instrument Value      <3.5 U/mL 17.0 (H)   Myeloperoxidase Antibody IgG      Negative Positive (A)   Proteinase 3 Zenaida IgG Instrument Value      <2.0 U/mL <1.0   Proteinase 3 Antibody IgG      Negative Negative   Protein Random Urine      g/L <0.05   Protein Total Urine g/gr Creatinine          Creatinine Urine      mg/dL 37   IGG      610-1,616 mg/dL 1,053   IGA      84 - 499 mg/dL 178   IGM      35 - 242 mg/dL 28 (L)   Creatinine      0.66 - 1.25 mg/dL 0.92   GFR Estimate      >60 mL/min/1.73m2 >90   CD19 B Cells      6 - 27 % 7   Absolute CD19      107 - 698 cells/uL 92 (L)   Neutrophil Cytoplasmic Antibody      <1:10 <1:10   Neutrophil Cytoplasmic Antibody Pattern       The ANCA IFA is <1:10.  No further testing will be performed.   AST      0 - 45 U/L 34   ALT      0 - 70 U/L 46   Albumin      3.4 - 5.0 g/dL 3.8   CRP Inflammation      0.0 - 8.0 mg/L <2.9   Sed Rate      0 - 15 mm/hr 10     Component      Latest Ref Rng & Units 2/1/2022   Color Urine      Colorless, Straw, Light Yellow, Yellow Yellow   Appearance Urine      Clear Clear   Glucose Urine      Negative mg/dL Negative   Bilirubin Urine      Negative Negative   Ketones Urine      Negative mg/dL Negative   Specific Gravity Urine      1.003 - 1.035 1.004   Blood Urine      Negative Negative   pH Urine      5.0 - 7.0 6.0   Protein Albumin Urine      Negative mg/dL Negative   Urobilinogen mg/dL      Normal, 2.0 mg/dL Normal   Nitrite Urine      Negative Negative   Leukocyte Esterase Urine      Negative Negative   RBC Urine      <=2 /HPF <1   WBC Urine      <=5 /HPF <1   MPO Zenaida IgG Instrument Value      <3.5 U/mL     Myeloperoxidase Antibody IgG      Negative    Proteinase 3 Zenaida IgG Instrument Value      <2.0 U/mL    Proteinase 3 Antibody IgG      Negative    Protein Random Urine      g/L <0.05   Protein Total Urine g/gr Creatinine          Creatinine Urine      mg/dL 24   IGG      610-1,616 mg/dL 1,146   IGA      84 - 499 mg/dL 168   IGM      35 - 242 mg/dL 30 (L)   Creatinine      0.66 - 1.25 mg/dL 1.06   GFR Estimate      >60 mL/min/1.73m2 89   CD19 B Cells      6 - 27 % 7   Absolute CD19      107 - 698 cells/uL 116   Neutrophil Cytoplasmic Antibody      <1:10 1:160 (H)   Neutrophil Cytoplasmic Antibody Pattern       Perinuclear ANCA (p-ANCA) staining pattern observed and confirmed on formalin-fixed neutrophils.   AST      0 - 45 U/L 38   ALT      0 - 70 U/L 40   Albumin      3.4 - 5.0 g/dL 3.7   CRP Inflammation      0.0 - 8.0 mg/L 7.4   Sed Rate      0 - 15 mm/hr 13     Component      Latest Ref Rng & Units 2/1/2022   WBC      4.0 - 11.0 10e3/uL 5.5   RBC Count      4.40 - 5.90 10e6/uL 4.39 (L)   Hemoglobin      13.3 - 17.7 g/dL 14.9   Hematocrit      40.0 - 53.0 % 42.8   MCV      78 - 100 fL 98   MCH      26.5 - 33.0 pg 33.9 (H)   MCHC      31.5 - 36.5 g/dL 34.8   RDW      10.0 - 15.0 % 12.2   Platelet Count      150 - 450 10e3/uL 257   % Neutrophils      % 54   % Lymphocytes      % 25   % Monocytes      % 10   % Eosinophils      % 9   % Basophils      % 2   % Immature Granulocytes      % 0   NRBCs per 100 WBC      <1 /100 0   Absolute Neutrophils      1.6 - 8.3 10e3/uL 3.0   Absolute Lymphocytes      0.8 - 5.3 10e3/uL 1.3   Absolute Monocytes      0.0 - 1.3 10e3/uL 0.6   Absolute Eosinophils      0.0 - 0.7 10e3/uL 0.5   Absolute Basophils      0.0 - 0.2 10e3/uL 0.1   Absolute Immature Granulocytes      <=0.4 10e3/uL 0.0   Absolute NRBCs      10e3/uL 0.0   Color Urine      Colorless, Straw, Light Yellow, Yellow Yellow   Appearance Urine      Clear Clear   Glucose Urine      Negative mg/dL Negative   Bilirubin Urine       Negative Negative   Ketones Urine      Negative mg/dL Negative   Specific Gravity Urine      1.003 - 1.035 1.004   Blood Urine      Negative Negative   pH Urine      5.0 - 7.0 6.0   Protein Albumin Urine      Negative mg/dL Negative   Urobilinogen mg/dL      Normal, 2.0 mg/dL Normal   Nitrite Urine      Negative Negative   Leukocyte Esterase Urine      Negative Negative   RBC Urine      <=2 /HPF <1   WBC Urine      <=5 /HPF <1   MPO Zenaida IgG Instrument Value      <3.5 U/mL 102.0 (H)   Myeloperoxidase Antibody IgG      Negative Positive (A)   Proteinase 3 Zenaida IgG Instrument Value      <2.0 U/mL <1.0   Proteinase 3 Antibody IgG      Negative Negative   IGG      610-1,616 mg/dL 1,146   IGA      84 - 499 mg/dL 168   IGM      35 - 242 mg/dL 30 (L)   Protein Random Urine      g/L <0.05   Protein Total Urine g/gr Creatinine          Creatinine Urine      mg/dL 24   Neutrophil Cytoplasmic Antibody      <1:10 1:160 (H)   Neutrophil Cytoplasmic Antibody Pattern       Perinuclear ANCA (p-ANCA) staining pattern observed and confirmed on formalin-fixed neutrophils.   CD19 B Cells      6 - 27 % 7   Absolute CD19      107 - 698 cells/uL 116   Creatinine      0.66 - 1.25 mg/dL 1.06   GFR Estimate      >60 mL/min/1.73m2 89   Sed Rate      0 - 15 mm/hr 13   CRP Inflammation      0.0 - 8.0 mg/L 7.4   Albumin      3.4 - 5.0 g/dL 3.7   ALT      0 - 70 U/L 40   AST      0 - 45 U/L 38     Component      Latest Ref Rng & Units 9/12/2022   WBC      4.0 - 11.0 10e3/uL 5.0   RBC Count      4.40 - 5.90 10e6/uL 4.50   Hemoglobin      13.3 - 17.7 g/dL 15.0   Hematocrit      40.0 - 53.0 % 43.5   MCV      78 - 100 fL 97   MCH      26.5 - 33.0 pg 33.3 (H)   MCHC      31.5 - 36.5 g/dL 34.5   RDW      10.0 - 15.0 % 12.5   Platelet Count      150 - 450 10e3/uL 221   % Neutrophils      % 49   % Lymphocytes      % 36   % Monocytes      % 12   % Eosinophils      % 1   % Basophils      % 1   % Immature Granulocytes      % 1   NRBCs per 100 WBC      <1 /100  0   Absolute Neutrophils      1.6 - 8.3 10e3/uL 2.4   Absolute Lymphocytes      0.8 - 5.3 10e3/uL 1.8   Absolute Monocytes      0.0 - 1.3 10e3/uL 0.6   Absolute Eosinophils      0.0 - 0.7 10e3/uL 0.0   Absolute Basophils      0.0 - 0.2 10e3/uL 0.1   Absolute Immature Granulocytes      <=0.4 10e3/uL 0.0   Absolute NRBCs      10e3/uL 0.0   Color Urine      Colorless, Straw, Light Yellow, Yellow Yellow   Appearance Urine      Clear Clear   Glucose Urine      Negative mg/dL Negative   Bilirubin Urine      Negative Negative   Ketones Urine      Negative mg/dL Negative   Specific Gravity Urine      1.003 - 1.035 1.005   Blood Urine      Negative Negative   pH Urine      5.0 - 7.0 5.0   Protein Albumin Urine      Negative mg/dL Negative   Urobilinogen mg/dL      Normal, 2.0 mg/dL Normal   Nitrite Urine      Negative Negative   Leukocyte Esterase Urine      Negative Negative   RBC Urine      <=2 /HPF 0   WBC Urine      <=5 /HPF 0   MPO Zenaida IgG Instrument Value      <3.5 U/mL 8.0 (H)   Myeloperoxidase Antibody IgG      Negative Positive (A)   Proteinase 3 Zenaida IgG Instrument Value      <2.0 U/mL <1.0   Proteinase 3 Antibody IgG      Negative Negative   Total Protein Urine mg/dL      mg/dL <6.0   Total Protein UR MG/MG CR          Creatinine Urine      mg/dL 20.3   IGG      610 - 1,616 mg/dL 906   IGA      84 - 499 mg/dL 160   IGM      35 - 242 mg/dL 32 (L)   Creatinine      0.67 - 1.17 mg/dL 1.06   GFR Estimate      >60 mL/min/1.73m2 89   CD19 B Cells      6 - 27 % 8   Absolute CD19      107 - 698 cells/uL 151   Neutrophil Cytoplasmic Antibody      <1:10 <1:10   Neutrophil Cytoplasmic Antibody Pattern       The ANCA IFA is <1:10.  No further testing will be performed.   AST      10 - 50 U/L 36   ALT      10 - 50 U/L 35   Albumin      3.5 - 5.2 g/dL 4.5   CRP Inflammation      <5.00 mg/L <3.00   Sed Rate      0 - 15 mm/hr 6           Again, thank you for allowing me to participate in the care of your patient.       Sincerely,    Isabella Manley MD

## 2022-11-30 NOTE — LETTER
Date:December 26, 2022      Patient was self referred, no letter generated. Do not send.        [facility] Health Information

## 2022-12-11 ENCOUNTER — HEALTH MAINTENANCE LETTER (OUTPATIENT)
Age: 42
End: 2022-12-11

## 2023-03-22 ENCOUNTER — VIRTUAL VISIT (OUTPATIENT)
Dept: RHEUMATOLOGY | Facility: CLINIC | Age: 43
End: 2023-03-22
Attending: INTERNAL MEDICINE
Payer: COMMERCIAL

## 2023-03-22 DIAGNOSIS — D72.18 EOSINOPHILIC GRANULOMATOSIS WITH POLYANGIITIS (EGPA) (H): Primary | ICD-10-CM

## 2023-03-22 DIAGNOSIS — M30.1 EOSINOPHILIC GRANULOMATOSIS WITH POLYANGIITIS (EGPA) (H): Primary | ICD-10-CM

## 2023-03-22 PROCEDURE — 99213 OFFICE O/P EST LOW 20 MIN: CPT | Mod: VID | Performed by: INTERNAL MEDICINE

## 2023-03-22 RX ORDER — BUDESONIDE 0.5 MG/2ML
INHALANT ORAL
COMMUNITY
Start: 2023-01-31

## 2023-03-22 NOTE — LETTER
3/22/2023       RE: Mj Gallegosmichael Brown.  [address]  [place] [address]     Dear Colleague,    Thank you for referring your patient, Mj Mason Jr., to the [facility] [place] at [facility]. Please see a copy of my visit note below.    Virtual Visit Details    Type of service:  Video Visit 12:29-12:40 pm    Originating Location (pt. 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 He has previously failed methotrexate, azathioprine, Nucala and, most recently, MMF due to recurrent sinusitis and respiratory symptoms.     11/2022: Specifically, methotrexate 8 tabs weekly was associated with recurrent URI with no improvement in symptoms on 6 tabs weekly. He also then transitioned to AZA in 8/2019 (NL TPMT) but was put on hold in 11/2019 given recurrent sinusitis and antibiotic tx, ultimately requiring sinus surgery/polypectomy on 12/10/2019. He retried AZA 50 mg bid on 3/2020 but had a transient elevated LFTs and improvement of MPO even before resuming AZA. He subsequently trialed Nucala on 2/2022 but discontinued on 9/2022 due to persistent sinusitis and symptoms flares. 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    At this time, the patient is still completing his antibiotic course for Moraxella sinusitis and would prefer to hold off on further immunosuppression. He  does feel that his symptoms are controlled at present on antibiotics and Medrol dose pack. We discussed risks and benefits of not restarting immunosuppression, and he was agreeable to considering a retrial of MMF pending further discussion at follow-up in 3 months. Exam today was otherwise unremarkable, and labs were notable for unremarkable inflammatory markers and ANCA.     Today 3/22/2023: Resumed  mg bid recently, was on it for about 3 wk, but started having sinus sx 2 wk ago, stopped MMF (cellcept), took medrol dose pack, no antibiotic needed this time. Stable labs from 11/2022 with neg ANCA and normal ESR/CRP, no asthma flares and reportedly almost no recurrence of nasal polyps. Feeling well today. Will check labs this month and monitor off MMF for flares. If he continues to flare up with sinus sx, will consider a trial of rituximab. Risks were discussed.    Today's Plan:    Continue follow-up with ENT    Medrol dose pack available as needed    Labs this month    Return in about 4 months (video visit)    Orders Placed This Encounter   Procedures     AST     ALT     Myeloperoxidase and Proteinase 3 Panel     Albumin level     Creatinine     CRP inflammation     UA with Microscopic reflex to Culture     Protein  random urine     Creatinine random urine     Erythrocyte sedimentation rate auto     CD19 B Cell Count     ANCA IgG by IFA with Reflex to Titer     Immunoglobulins A G and M     CBC with Platelets & Differential       Isabella Manley MD    HPI:    8/20/2021: On AZA 50 mg bid since end of 3/2020. Tolerates it well. Reports no recurrence of nasal polyps. He will do follow up with ENT. Labs from 2/2021 are stable. His EGPA seems to be stable. Reports seasonal allergies at this time of the year, using inhalor. Nasal congestion got better by missing AZA x 3 days.    We discussed covid booster vaccine, no official recommendation for J&J receivers yet but most likely will need a booster being  immunocompromised.    2/4/2022: Flaring with EGPA, sinus inflammation, increased vasculitis markers. On and off AZA due to sinus infections. Recommend to switch to nucala to control EGPA; risks were discussed. Will treat with prednisone taper.    8/18/2022: Unclear if sinus sx are due to vasculitis and if he fails nucala or not, will get ENT opinion and check labs.    9/23/2022: Labs are stable, but clinically he continues to flare with sinusitis, nasal polyps, steroid responsive. Since he failed nucala, recommend to stop. Discussed next tx options cellcept versus rituximab. Agreed to try cellcept as safer option next; risks were discussed.    11/30/2022:    Patient reports that he discontinued MMF 2 weeks ago due to worsening congestion, post nasal drip, cough and shortness of breath with subsequent ENT evaluation revealing nasal culture positive sinus infection on 11/11/22. He was started on an unspecified antibiotic on 11/16 and began PRN Medrol dose pack provided by Rheumatology staff with immediate improvement in symptoms. He now continues to have aforementioned symptoms but reduced in severity.    No other acute complaints when seen today. Patient overall reports feeling that symptoms are controlled on antibiotics and steroids and would prefer to wait on restarting MMF or other immunosuppressive regimen at this time.       Today 3/22/2023:     Tried MMF again for about 3wk [place], got recurrence of sinus sx, tried medrol dose pack and stopped cellcept, this was 2 wk ago, medrol helped.    Asthma is well controlled on Symbicort inh only, has not required rescue inhaler.     No new sx of rash, neuropathy, joint pain.    Feeling well today.        History of Presenting Illness    2/4/2022: Had 2 sinusitis, went on 2 rounds of antibiotics. Had 3 different bacteria on the culture. Off antibiotic x 2 weeks, resumed AZA x past 2 weeks. Still gets PND and coughs a lot. ENT saw  inflammation on exam with no infection, this was about 2.5 wk ago when he was recommended to resume AZA. Was off AZA from 9/2021 till two weeks ago.  1st and 2nd round of antibiotic, did not come off AZA, just with 3rd round. Has some SOB, uses inhalers more. Gets winded more with exercise.     8/19/2022: His nose gets stuffy on lying down. Saw ENT and was given prednisone and it helped some, but cough and congestion is coming back off prednisone. Took last prednisone last week. It was 40 mg every day max. Seasonal allergies are triggers. Sometimes it is harder to breathe when he is working out.     9/23/2022: Lucio presents for follow up, continues to have flares of sinusitis with nasal polyps despite being on nucala, which are steroid responsive.    Mj Isabella Brown. 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    He is doing fairly well. No recurrence of nasal polyps on imuran. Last seen by ENT in 12/2020, has upcoming appointment. Reports having seasonal allergies at this time of the year, has been using his inhalers x past 2 weeks. Reports some nasal congestion, it was better when he ran out of AZA x 3 days early this week.    He sleeps at the side, has block nostril, was better on medrol dose pack.    Medrol dose pack finished yesterday. Last antibiotic was a month ago. Medrol works better, prednisone does not help.    Uses inhaler to prevent asthma.    No joint pain or skin rash.    Had surgery for R shoulder rotator cuff full tear which was successful.    Had J&J covid vaccine on 4/8/2021, tolerated it well.    Family History   Problem Relation Age of Onset     Colon Cancer Maternal Grandmother      Stomach Cancer Paternal Grandmother      No Known Problems Mother      No Known Problems Father      No Known Problems Maternal Grandfather      No Known Problems Paternal Grandfather      No Known Problems Brother      No Known Problems Sister      No Known Problems Son      No Known  Problems Daughter      No Known Problems Maternal Half-Brother      No Known Problems Maternal Half-Sister      No Known Problems Paternal Half-Brother      No Known Problems Paternal Half-Sister      No Known Problems Niece      No Known Problems Nephew      No Known Problems Cousin      No Known Problems Other      Cancer No family hx of      Diabetes No family hx of      Heart Disease No family hx of      Hypertension No family hx of      Cerebrovascular Disease No family hx of      Asthma No family hx of      Thyroid Disease No family hx of      Depression No family hx of      Mental Illness No family hx of      Substance Abuse No family hx of      Dementia No family hx of      Bleeding Disorder No family hx of      Congenital Anomalies No family hx of      Migraines No family hx of      Stomach Problem No family hx of      Muscular Disorder No family hx of      Osteoporosis No family hx of      Unknown/Adopted No family hx of      Social History     Socioeconomic History     Marital status:      Spouse name: Not on file     Number of children: 2     Years of education: Not on file     Highest education level: Not on file   Occupational History     Occupation:  manager   Tobacco Use     Smoking status: Never     Smokeless tobacco: Never   Substance and Sexual Activity     Alcohol use: Yes     Drug use: Never     Sexual activity: Not on file   Other Topics Concern     Not on file   Social History Narrative     Not on file     Social Determinants of Health     Financial Resource Strain: Not on file   Food Insecurity: Not on file   Transportation Needs: Not on file   Physical Activity: Not on file   Stress: Not on file   Social Connections: Not on file   Intimate Partner Violence: Not on file   Housing Stability: Not on file       No Known Allergies    Outpatient Encounter Medications as of 3/22/2023   Medication Sig Dispense Refill     albuterol (PROAIR HFA/PROVENTIL HFA/VENTOLIN HFA) 108 (90 Base)  MCG/ACT inhaler as needed        budesonide (PULMICORT) 1 MG/2ML neb solution Take 1 mg by nebulization daily        montelukast (SINGULAIR) 10 MG tablet Take 10 mg by mouth At Bedtime        SYMBICORT 160-4.5 MCG/ACT Inhaler Inhale 2 puffs into the lungs 2 times daily        budesonide (PULMICORT) 0.5 MG/2ML neb solution  (Patient not taking: Reported on 3/22/2023)       cetirizine (ZYRTEC) 10 MG tablet Take 10 mg by mouth daily  (Patient not taking: Reported on 9/23/2022)       clindamycin phosphate (EVOCLIN) 1 % external foam daily  (Patient not taking: Reported on 9/23/2022)       diclofenac (VOLTAREN) 1 % topical gel Apply 2 g topically 4 times daily as needed for moderate pain To use over R shoulder (Patient not taking: Reported on 9/23/2022) 100 g 1     FLOVENT  MCG/ACT inhaler Inhale 1 puff into the lungs daily  (Patient not taking: Reported on 9/23/2022)       levocetirizine (XYZAL) 5 MG tablet Take 5 mg by mouth every morning (Patient not taking: Reported on 3/22/2023)       methylPREDNISolone (MEDROL DOSEPAK) 4 MG tablet therapy pack Take per package instructions. As needed for flare up (Patient not taking: Reported on 3/22/2023) 21 tablet 3     mycophenolate (GENERIC EQUIVALENT) 500 MG tablet Take 1 tablet (500 mg) by mouth 2 times daily (Patient not taking: Reported on 11/30/2022) 60 tablet 3     tretinoin (RETIN-A) 0.1 % external cream Apply topically as needed  (Patient not taking: Reported on 9/23/2022)       No facility-administered encounter medications on file as of 3/22/2023.       ROS:  A comprehensive ROS was done, positives are per HPI.    Ph.E:    Constitutional: WD/WN. Pleasant. In no acute distress.  HEENT: EOM intact, sclera anicteric, conj not injected. No saddle nose deformity, sounds a little bit congested  MS: No synovitis  Skin: No skin rash  Neuro: A&O x 3  Psych: NL affect     His records were reviewed.    4/2019: pos MPO, p-ANCA    7/2019: NL ESR/CRP    Component      Latest  Ref Rng & Units 3/25/2020   WBC      4.0 - 11.0 10e9/L 3.8 (L)   RBC Count      4.4 - 5.9 10e12/L 4.53   Hemoglobin      13.3 - 17.7 g/dL 15.1   Hematocrit      40.0 - 53.0 % 45.3   MCV      78 - 100 fl 100   MCH      26.5 - 33.0 pg 33.3 (H)   MCHC      31.5 - 36.5 g/dL 33.3   RDW      10.0 - 15.0 % 13.0   Platelet Count      150 - 450 10e9/L 235   Diff Method       Automated Method   % Neutrophils      % 43.3   % Lymphocytes      % 41.0   % Monocytes      % 9.0   % Eosinophils      % 4.8   % Basophils      % 1.6   % Immature Granulocytes      % 0.3   Nucleated RBCs      0 /100 0   Absolute Neutrophil      1.6 - 8.3 10e9/L 1.6   Absolute Lymphocytes      0.8 - 5.3 10e9/L 1.5   Absolute Monocytes      0.0 - 1.3 10e9/L 0.3   Absolute Eosinophils      0.0 - 0.7 10e9/L 0.2   Absolute Basophils      0.0 - 0.2 10e9/L 0.1   Abs Immature Granulocytes      0 - 0.4 10e9/L 0.0   Absolute Nucleated RBC       0.0   Sodium      133 - 144 mmol/L 140   Potassium      3.4 - 5.3 mmol/L 3.6   Chloride      94 - 109 mmol/L 106   Carbon Dioxide      20 - 32 mmol/L 32   Anion Gap      3 - 14 mmol/L 2 (L)   Glucose      70 - 99 mg/dL 53 (L)   Urea Nitrogen      7 - 30 mg/dL 22   Creatinine      0.66 - 1.25 mg/dL 0.98   GFR Estimate      >60 mL/min/1.73:m2 >90   GFR Estimate If Black      >60 mL/min/1.73:m2 >90   Calcium      8.5 - 10.1 mg/dL 9.1   Bilirubin Total      0.2 - 1.3 mg/dL 0.6   Albumin      3.4 - 5.0 g/dL 4.4   Protein Total      6.8 - 8.8 g/dL 7.5   Alkaline Phosphatase      40 - 150 U/L 52   ALT      0 - 70 U/L 45   AST      0 - 45 U/L 28   Color Urine       Yellow   Appearance Urine       Clear   Glucose Urine      NEG:Negative mg/dL Negative   Bilirubin Urine      NEG:Negative Negative   Ketones Urine      NEG:Negative mg/dL Negative   Specific Gravity Urine      1.003 - 1.035 1.006   Blood Urine      NEG:Negative Negative   pH Urine      5.0 - 7.0 pH 7.0   Protein Albumin Urine      NEG:Negative mg/dL Negative    Urobilinogen mg/dL      0.0 - 2.0 mg/dL 0.0   Nitrite Urine      NEG:Negative Negative   Leukocyte Esterase Urine      NEG:Negative Negative   Source       Midstream Urine   WBC Urine      0 - 5 /HPF 0   RBC Urine      0 - 2 /HPF 0   Squamous Epithelial /HPF Urine      0 - 1 /HPF <1   Neutrophil Cytoplasmic Antibody      <1:10 titer <1:10   Neutrophil Cytoplasmic Antibody Pattern       The ANCA IFA is <1:10.  No further testing will be performed.   Protein Random Urine      g/L <0.05   Protein Total Urine g/gr Creatinine      0 - 0.2 g/g Cr Unable to calculate due to low value   Myeloperoxidase Antibody IgG      0.0 - 0.9 AI 4.7 (H)   Proteinase 3 Antibody IgG      0.0 - 0.9 AI <0.2   IGE      0 - 114 KIU/L 22   CRP Inflammation      0.0 - 8.0 mg/L <2.9   Sed Rate      0 - 15 mm/h 5   Creatinine Urine      mg/dL 24     Stable labs in 2/2021    Component      Latest Ref Rng & Units 11/16/2021   Color Urine      Colorless, Straw, Light Yellow, Yellow Yellow   Appearance Urine      Clear Clear   Glucose Urine      Negative mg/dL Negative   Bilirubin Urine      Negative Negative   Ketones Urine      Negative mg/dL Negative   Specific Gravity Urine      1.003 - 1.035 1.009   Blood Urine      Negative Negative   pH Urine      5.0 - 7.0 6.0   Protein Albumin Urine      Negative mg/dL Negative   Urobilinogen mg/dL      Normal, 2.0 mg/dL Normal   Nitrite Urine      Negative Negative   Leukocyte Esterase Urine      Negative Negative   RBC Urine      <=2 /HPF <1   WBC Urine      <=5 /HPF 0   MPO Zenaida IgG Instrument Value      <3.5 U/mL 17.0 (H)   Myeloperoxidase Antibody IgG      Negative Positive (A)   Proteinase 3 Zenaida IgG Instrument Value      <2.0 U/mL <1.0   Proteinase 3 Antibody IgG      Negative Negative   Protein Random Urine      g/L <0.05   Protein Total Urine g/gr Creatinine          Creatinine Urine      mg/dL 37   IGG      610-1,616 mg/dL 1,053   IGA      84 - 499 mg/dL 178   IGM      35 - 242 mg/dL 28 (L)    Creatinine      0.66 - 1.25 mg/dL 0.92   GFR Estimate      >60 mL/min/1.73m2 >90   CD19 B Cells      6 - 27 % 7   Absolute CD19      107 - 698 cells/uL 92 (L)   Neutrophil Cytoplasmic Antibody      <1:10 <1:10   Neutrophil Cytoplasmic Antibody Pattern       The ANCA IFA is <1:10.  No further testing will be performed.   AST      0 - 45 U/L 34   ALT      0 - 70 U/L 46   Albumin      3.4 - 5.0 g/dL 3.8   CRP Inflammation      0.0 - 8.0 mg/L <2.9   Sed Rate      0 - 15 mm/hr 10     Component      Latest Ref Rng & Units 2/1/2022   Color Urine      Colorless, Straw, Light Yellow, Yellow Yellow   Appearance Urine      Clear Clear   Glucose Urine      Negative mg/dL Negative   Bilirubin Urine      Negative Negative   Ketones Urine      Negative mg/dL Negative   Specific Gravity Urine      1.003 - 1.035 1.004   Blood Urine      Negative Negative   pH Urine      5.0 - 7.0 6.0   Protein Albumin Urine      Negative mg/dL Negative   Urobilinogen mg/dL      Normal, 2.0 mg/dL Normal   Nitrite Urine      Negative Negative   Leukocyte Esterase Urine      Negative Negative   RBC Urine      <=2 /HPF <1   WBC Urine      <=5 /HPF <1   MPO Zenaida IgG Instrument Value      <3.5 U/mL    Myeloperoxidase Antibody IgG      Negative    Proteinase 3 Zenaida IgG Instrument Value      <2.0 U/mL    Proteinase 3 Antibody IgG      Negative    Protein Random Urine      g/L <0.05   Protein Total Urine g/gr Creatinine          Creatinine Urine      mg/dL 24   IGG      610-1,616 mg/dL 1,146   IGA      84 - 499 mg/dL 168   IGM      35 - 242 mg/dL 30 (L)   Creatinine      0.66 - 1.25 mg/dL 1.06   GFR Estimate      >60 mL/min/1.73m2 89   CD19 B Cells      6 - 27 % 7   Absolute CD19      107 - 698 cells/uL 116   Neutrophil Cytoplasmic Antibody      <1:10 1:160 (H)   Neutrophil Cytoplasmic Antibody Pattern       Perinuclear ANCA (p-ANCA) staining pattern observed and confirmed on formalin-fixed neutrophils.   AST      0 - 45 U/L 38   ALT      0 - 70 U/L 40    Albumin      3.4 - 5.0 g/dL 3.7   CRP Inflammation      0.0 - 8.0 mg/L 7.4   Sed Rate      0 - 15 mm/hr 13     Component      Latest Ref Rng & Units 2/1/2022   WBC      4.0 - 11.0 10e3/uL 5.5   RBC Count      4.40 - 5.90 10e6/uL 4.39 (L)   Hemoglobin      13.3 - 17.7 g/dL 14.9   Hematocrit      40.0 - 53.0 % 42.8   MCV      78 - 100 fL 98   MCH      26.5 - 33.0 pg 33.9 (H)   MCHC      31.5 - 36.5 g/dL 34.8   RDW      10.0 - 15.0 % 12.2   Platelet Count      150 - 450 10e3/uL 257   % Neutrophils      % 54   % Lymphocytes      % 25   % Monocytes      % 10   % Eosinophils      % 9   % Basophils      % 2   % Immature Granulocytes      % 0   NRBCs per 100 WBC      <1 /100 0   Absolute Neutrophils      1.6 - 8.3 10e3/uL 3.0   Absolute Lymphocytes      0.8 - 5.3 10e3/uL 1.3   Absolute Monocytes      0.0 - 1.3 10e3/uL 0.6   Absolute Eosinophils      0.0 - 0.7 10e3/uL 0.5   Absolute Basophils      0.0 - 0.2 10e3/uL 0.1   Absolute Immature Granulocytes      <=0.4 10e3/uL 0.0   Absolute NRBCs      10e3/uL 0.0   Color Urine      Colorless, Straw, Light Yellow, Yellow Yellow   Appearance Urine      Clear Clear   Glucose Urine      Negative mg/dL Negative   Bilirubin Urine      Negative Negative   Ketones Urine      Negative mg/dL Negative   Specific Gravity Urine      1.003 - 1.035 1.004   Blood Urine      Negative Negative   pH Urine      5.0 - 7.0 6.0   Protein Albumin Urine      Negative mg/dL Negative   Urobilinogen mg/dL      Normal, 2.0 mg/dL Normal   Nitrite Urine      Negative Negative   Leukocyte Esterase Urine      Negative Negative   RBC Urine      <=2 /HPF <1   WBC Urine      <=5 /HPF <1   MPO Zenaida IgG Instrument Value      <3.5 U/mL 102.0 (H)   Myeloperoxidase Antibody IgG      Negative Positive (A)   Proteinase 3 Zenaida IgG Instrument Value      <2.0 U/mL <1.0   Proteinase 3 Antibody IgG      Negative Negative   IGG      610-1,616 mg/dL 1,146   IGA      84 - 499 mg/dL 168   IGM      35 - 242 mg/dL 30 (L)   Protein  Random Urine      g/L <0.05   Protein Total Urine g/gr Creatinine          Creatinine Urine      mg/dL 24   Neutrophil Cytoplasmic Antibody      <1:10 1:160 (H)   Neutrophil Cytoplasmic Antibody Pattern       Perinuclear ANCA (p-ANCA) staining pattern observed and confirmed on formalin-fixed neutrophils.   CD19 B Cells      6 - 27 % 7   Absolute CD19      107 - 698 cells/uL 116   Creatinine      0.66 - 1.25 mg/dL 1.06   GFR Estimate      >60 mL/min/1.73m2 89   Sed Rate      0 - 15 mm/hr 13   CRP Inflammation      0.0 - 8.0 mg/L 7.4   Albumin      3.4 - 5.0 g/dL 3.7   ALT      0 - 70 U/L 40   AST      0 - 45 U/L 38     Component      Latest Ref Rng & Units 9/12/2022   WBC      4.0 - 11.0 10e3/uL 5.0   RBC Count      4.40 - 5.90 10e6/uL 4.50   Hemoglobin      13.3 - 17.7 g/dL 15.0   Hematocrit      40.0 - 53.0 % 43.5   MCV      78 - 100 fL 97   MCH      26.5 - 33.0 pg 33.3 (H)   MCHC      31.5 - 36.5 g/dL 34.5   RDW      10.0 - 15.0 % 12.5   Platelet Count      150 - 450 10e3/uL 221   % Neutrophils      % 49   % Lymphocytes      % 36   % Monocytes      % 12   % Eosinophils      % 1   % Basophils      % 1   % Immature Granulocytes      % 1   NRBCs per 100 WBC      <1 /100 0   Absolute Neutrophils      1.6 - 8.3 10e3/uL 2.4   Absolute Lymphocytes      0.8 - 5.3 10e3/uL 1.8   Absolute Monocytes      0.0 - 1.3 10e3/uL 0.6   Absolute Eosinophils      0.0 - 0.7 10e3/uL 0.0   Absolute Basophils      0.0 - 0.2 10e3/uL 0.1   Absolute Immature Granulocytes      <=0.4 10e3/uL 0.0   Absolute NRBCs      10e3/uL 0.0   Color Urine      Colorless, Straw, Light Yellow, Yellow Yellow   Appearance Urine      Clear Clear   Glucose Urine      Negative mg/dL Negative   Bilirubin Urine      Negative Negative   Ketones Urine      Negative mg/dL Negative   Specific Gravity Urine      1.003 - 1.035 1.005   Blood Urine      Negative Negative   pH Urine      5.0 - 7.0 5.0   Protein Albumin Urine      Negative mg/dL Negative   Urobilinogen  mg/dL      Normal, 2.0 mg/dL Normal   Nitrite Urine      Negative Negative   Leukocyte Esterase Urine      Negative Negative   RBC Urine      <=2 /HPF 0   WBC Urine      <=5 /HPF 0   MPO Zenaida IgG Instrument Value      <3.5 U/mL 8.0 (H)   Myeloperoxidase Antibody IgG      Negative Positive (A)   Proteinase 3 Zenaida IgG Instrument Value      <2.0 U/mL <1.0   Proteinase 3 Antibody IgG      Negative Negative   Total Protein Urine mg/dL      mg/dL <6.0   Total Protein UR MG/MG CR          Creatinine Urine      mg/dL 20.3   IGG      610 - 1,616 mg/dL 906   IGA      84 - 499 mg/dL 160   IGM      35 - 242 mg/dL 32 (L)   Creatinine      0.67 - 1.17 mg/dL 1.06   GFR Estimate      >60 mL/min/1.73m2 89   CD19 B Cells      6 - 27 % 8   Absolute CD19      107 - 698 cells/uL 151   Neutrophil Cytoplasmic Antibody      <1:10 <1:10   Neutrophil Cytoplasmic Antibody Pattern       The ANCA IFA is <1:10.  No further testing will be performed.   AST      10 - 50 U/L 36   ALT      10 - 50 U/L 35   Albumin      3.5 - 5.2 g/dL 4.5   CRP Inflammation      <5.00 mg/L <3.00   Sed Rate      0 - 15 mm/hr 6     Component      Latest Ref Rng & Units 11/1/2022   WBC      4.0 - 11.0 10e3/uL 6.4   RBC Count      4.40 - 5.90 10e6/uL 4.44   Hemoglobin      13.3 - 17.7 g/dL 14.8   Hematocrit      40.0 - 53.0 % 42.7   MCV      78 - 100 fL 96   MCH      26.5 - 33.0 pg 33.3 (H)   MCHC      31.5 - 36.5 g/dL 34.7   RDW      10.0 - 15.0 % 12.5   Platelet Count      150 - 450 10e3/uL 247   % Neutrophils      % 62   % Lymphocytes      % 26   % Monocytes      % 9   % Eosinophils      % 2   % Basophils      % 1   % Immature Granulocytes      % 0   NRBCs per 100 WBC      <1 /100 0   Absolute Neutrophils      1.6 - 8.3 10e3/uL 4.0   Absolute Lymphocytes      0.8 - 5.3 10e3/uL 1.7   Absolute Monocytes      0.0 - 1.3 10e3/uL 0.6   Absolute Eosinophils      0.0 - 0.7 10e3/uL 0.1   Absolute Basophils      0.0 - 0.2 10e3/uL 0.1   Absolute Immature Granulocytes      <=0.4  10e3/uL 0.0   Absolute NRBCs      10e3/uL 0.0   Color Urine      Colorless, Straw, Light Yellow, Yellow Straw   Appearance Urine      Clear Clear   Glucose Urine      Negative mg/dL Negative   Bilirubin Urine      Negative Negative   Ketones Urine      Negative mg/dL Negative   Specific Gravity Urine      1.003 - 1.035 1.010   Blood Urine      Negative Negative   pH Urine      5.0 - 7.0 6.0   Protein Albumin Urine      Negative mg/dL Negative   Urobilinogen mg/dL      Normal, 2.0 mg/dL Normal   Nitrite Urine      Negative Negative   Leukocyte Esterase Urine      Negative Negative   RBC Urine      <=2 /HPF 0   WBC Urine      <=5 /HPF <1   MPO Zenaida IgG Instrument Value      <3.5 U/mL 1.5   Myeloperoxidase Antibody IgG      Negative Negative   Proteinase 3 [place] IgG Instrument Value      <2.0 U/mL <1.0   Proteinase 3 Antibody IgG      Negative Negative   Total Protein Urine mg/dL      mg/dL <6.0   Total Protein UR MG/MG CR          Creatinine Urine      mg/dL 36.7   IGG      610 - 1,616 mg/dL 948   IGA      84 - 499 mg/dL 154   IGM      35 - 242 mg/dL 27 (L)   Creatinine      0.67 - 1.17 mg/dL 0.97   GFR Estimate      >60 mL/min/1.73m2 >90   CD19 B Cells      6 - 27 % 13   Absolute CD19      107 - 698 cells/uL 240   Neutrophil Cytoplasmic Antibody      <1:10 <1:10   Neutrophil Cytoplasmic Antibody Pattern       The ANCA IFA is <1:10.  No further testing will be performed.   AST      10 - 50 U/L 44   ALT      10 - 50 U/L 37   Albumin      3.5 - 5.2 g/dL 4.6   CRP Inflammation      <5.00 mg/L <3.00   Sed Rate      0 - 15 mm/hr 6           Again, thank you for allowing me to participate in the care of your patient.      Sincerely,    Isabella Manley MD

## 2023-03-22 NOTE — PROGRESS NOTES
Virtual Visit Details    Type of service:  Video Visit 12:29-12:40 pm    Originating Location (pt. 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 He has previously failed methotrexate, azathioprine, Nucala and, most recently, MMF due to recurrent sinusitis and respiratory symptoms.     11/2022: Specifically, methotrexate 8 tabs weekly was associated with recurrent URI with no improvement in symptoms on 6 tabs weekly. He also then transitioned to AZA in 8/2019 (NL TPMT) but was put on hold in 11/2019 given recurrent sinusitis and antibiotic tx, ultimately requiring sinus surgery/polypectomy on 12/10/2019. He retried AZA 50 mg bid on 3/2020 but had a transient elevated LFTs and improvement of MPO even before resuming AZA. He subsequently trialed Nucala on 2/2022 but discontinued on 9/2022 due to persistent sinusitis and symptoms flares. 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    At this time, the patient is still completing his antibiotic course for Moraxella sinusitis and would prefer to hold off on further immunosuppression. He does feel that his symptoms are controlled at present on antibiotics and Medrol dose pack. We discussed risks and benefits of not restarting immunosuppression, and he was agreeable to considering a retrial of MMF pending further discussion at follow-up in 3 months. Exam today was otherwise unremarkable, and labs were  notable for unremarkable inflammatory markers and ANCA.     Today 3/22/2023: Resumed  mg bid recently, was on it for about 3 wk, but started having sinus sx 2 wk ago, stopped MMF (cellcept), took medrol dose pack, no antibiotic needed this time. Stable labs from 11/2022 with neg ANCA and normal ESR/CRP, no asthma flares and reportedly almost no recurrence of nasal polyps. Feeling well today. Will check labs this month and monitor off MMF for flares. If he continues to flare up with sinus sx, will consider a trial of rituximab. Risks were discussed.    Today's Plan:    Continue follow-up with ENT    Medrol dose pack available as needed    Labs this month    Return in about 4 months (video visit)    Orders Placed This Encounter   Procedures     AST     ALT     Myeloperoxidase and Proteinase 3 Panel     Albumin level     Creatinine     CRP inflammation     UA with Microscopic reflex to Culture     Protein  random urine     Creatinine random urine     Erythrocyte sedimentation rate auto     CD19 B Cell Count     ANCA IgG by IFA with Reflex to Titer     Immunoglobulins A G and M     CBC with Platelets & Differential       Isabella Manley MD    HPI:    8/20/2021: On AZA 50 mg bid since end of 3/2020. Tolerates it well. Reports no recurrence of nasal polyps. He will do follow up with ENT. Labs from 2/2021 are stable. His EGPA seems to be stable. Reports seasonal allergies at this time of the year, using inhalor. Nasal congestion got better by missing AZA x 3 days.    We discussed covid booster vaccine, no official recommendation for J&J receivers yet but most likely will need a booster being immunocompromised.    2/4/2022: Flaring with EGPA, sinus inflammation, increased vasculitis markers. On and off AZA due to sinus infections. Recommend to switch to nucala to control EGPA; risks were discussed. Will treat with prednisone taper.    8/18/2022: Unclear if sinus sx are due to vasculitis and if he fails nucala or not,  will get ENT opinion and check labs.    9/23/2022: Labs are stable, but clinically he continues to flare with sinusitis, nasal polyps, steroid responsive. Since he failed nucala, recommend to stop. Discussed next tx options cellcept versus rituximab. Agreed to try cellcept as safer option next; risks were discussed.    11/30/2022:    Patient reports that he discontinued MMF 2 weeks ago due to worsening congestion, post nasal drip, cough and shortness of breath with subsequent ENT evaluation revealing nasal culture positive sinus infection on 11/11/22. He was started on an unspecified antibiotic on 11/16 and began PRN Medrol dose pack provided by Rheumatology staff with immediate improvement in symptoms. He now continues to have aforementioned symptoms but reduced in severity.    No other acute complaints when seen today. Patient overall reports feeling that symptoms are controlled on antibiotics and steroids and would prefer to wait on restarting MMF or other immunosuppressive regimen at this time.       Today 3/22/2023:     Tried MMF again for about 3wk [place], got recurrence of sinus sx, tried medrol dose pack and stopped cellcept, this was 2 wk ago, medrol helped.    Asthma is well controlled on Symbicort inh only, has not required rescue inhaler.     No new sx of rash, neuropathy, joint pain.    Feeling well today.        History of Presenting Illness    2/4/2022: Had 2 sinusitis, went on 2 rounds of antibiotics. Had 3 different bacteria on the culture. Off antibiotic x 2 weeks, resumed AZA x past 2 weeks. Still gets PND and coughs a lot. ENT saw inflammation on exam with no infection, this was about 2.5 wk ago when he was recommended to resume AZA. Was off AZA from 9/2021 till two weeks ago.  1st and 2nd round of antibiotic, did not come off AZA, just with 3rd round. Has some SOB, uses inhalers more. Gets winded more with exercise.     8/19/2022: His nose gets stuffy on lying  down. Saw ENT and was given prednisone and it helped some, but cough and congestion is coming back off prednisone. Took last prednisone last week. It was 40 mg every day max. Seasonal allergies are triggers. Sometimes it is harder to breathe when he is working out.     9/23/2022: Lucio presents for follow up, continues to have flares of sinusitis with nasal polyps despite being on nucala, which are steroid responsive.    Mj Georgeantoni Brown. 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    He is doing fairly well. No recurrence of nasal polyps on imuran. Last seen by ENT in 12/2020, has upcoming appointment. Reports having seasonal allergies at this time of the year, has been using his inhalers x past 2 weeks. Reports some nasal congestion, it was better when he ran out of AZA x 3 days early this week.    He sleeps at the side, has block nostril, was better on medrol dose pack.    Medrol dose pack finished yesterday. Last antibiotic was a month ago. Medrol works better, prednisone does not help.    Uses inhaler to prevent asthma.    No joint pain or skin rash.    Had surgery for R shoulder rotator cuff full tear which was successful.    Had J&J covid vaccine on 4/8/2021, tolerated it well.    Family History   Problem Relation Age of Onset     Colon Cancer Maternal Grandmother      Stomach Cancer Paternal Grandmother      No Known Problems Mother      No Known Problems Father      No Known Problems Maternal Grandfather      No Known Problems Paternal Grandfather      No Known Problems Brother      No Known Problems Sister      No Known Problems Son      No Known Problems Daughter      No Known Problems Maternal Half-Brother      No Known Problems Maternal Half-Sister      No Known Problems Paternal Half-Brother      No Known Problems Paternal Half-Sister      No Known Problems Niece      No Known Problems Nephew      No Known Problems Cousin      No Known Problems Other      Cancer No family hx  of      Diabetes No family hx of      Heart Disease No family hx of      Hypertension No family hx of      Cerebrovascular Disease No family hx of      Asthma No family hx of      Thyroid Disease No family hx of      Depression No family hx of      Mental Illness No family hx of      Substance Abuse No family hx of      Dementia No family hx of      Bleeding Disorder No family hx of      Congenital Anomalies No family hx of      Migraines No family hx of      Stomach Problem No family hx of      Muscular Disorder No family hx of      Osteoporosis No family hx of      Unknown/Adopted No family hx of      Social History     Socioeconomic History     Marital status:      Spouse name: Not on file     Number of children: 2     Years of education: Not on file     Highest education level: Not on file   Occupational History     Occupation:  manager   Tobacco Use     Smoking status: Never     Smokeless tobacco: Never   Substance and Sexual Activity     Alcohol use: Yes     Drug use: Never     Sexual activity: Not on file   Other Topics Concern     Not on file   Social History Narrative     Not on file     Social Determinants of Health     Financial Resource Strain: Not on file   Food Insecurity: Not on file   Transportation Needs: Not on file   Physical Activity: Not on file   Stress: Not on file   Social Connections: Not on file   Intimate Partner Violence: Not on file   Housing Stability: Not on file       No Known Allergies    Outpatient Encounter Medications as of 3/22/2023   Medication Sig Dispense Refill     albuterol (PROAIR HFA/PROVENTIL HFA/VENTOLIN HFA) 108 (90 Base) MCG/ACT inhaler as needed        budesonide (PULMICORT) 1 MG/2ML neb solution Take 1 mg by nebulization daily        montelukast (SINGULAIR) 10 MG tablet Take 10 mg by mouth At Bedtime        SYMBICORT 160-4.5 MCG/ACT Inhaler Inhale 2 puffs into the lungs 2 times daily        budesonide (PULMICORT) 0.5 MG/2ML neb solution  (Patient not  taking: Reported on 3/22/2023)       cetirizine (ZYRTEC) 10 MG tablet Take 10 mg by mouth daily  (Patient not taking: Reported on 9/23/2022)       clindamycin phosphate (EVOCLIN) 1 % external foam daily  (Patient not taking: Reported on 9/23/2022)       diclofenac (VOLTAREN) 1 % topical gel Apply 2 g topically 4 times daily as needed for moderate pain To use over R shoulder (Patient not taking: Reported on 9/23/2022) 100 g 1     FLOVENT  MCG/ACT inhaler Inhale 1 puff into the lungs daily  (Patient not taking: Reported on 9/23/2022)       levocetirizine (XYZAL) 5 MG tablet Take 5 mg by mouth every morning (Patient not taking: Reported on 3/22/2023)       methylPREDNISolone (MEDROL DOSEPAK) 4 MG tablet therapy pack Take per package instructions. As needed for flare up (Patient not taking: Reported on 3/22/2023) 21 tablet 3     mycophenolate (GENERIC EQUIVALENT) 500 MG tablet Take 1 tablet (500 mg) by mouth 2 times daily (Patient not taking: Reported on 11/30/2022) 60 tablet 3     tretinoin (RETIN-A) 0.1 % external cream Apply topically as needed  (Patient not taking: Reported on 9/23/2022)       No facility-administered encounter medications on file as of 3/22/2023.       ROS:  A comprehensive ROS was done, positives are per HPI.    Ph.E:    Constitutional: WD/WN. Pleasant. In no acute distress.  HEENT: EOM intact, sclera anicteric, conj not injected. No saddle nose deformity, sounds a little bit congested  MS: No synovitis  Skin: No skin rash  Neuro: A&O x 3  Psych: NL affect     His records were reviewed.    4/2019: pos MPO, p-ANCA    7/2019: NL ESR/CRP    Component      Latest Ref Rng & Units 3/25/2020   WBC      4.0 - 11.0 10e9/L 3.8 (L)   RBC Count      4.4 - 5.9 10e12/L 4.53   Hemoglobin      13.3 - 17.7 g/dL 15.1   Hematocrit      40.0 - 53.0 % 45.3   MCV      78 - 100 fl 100   MCH      26.5 - 33.0 pg 33.3 (H)   MCHC      31.5 - 36.5 g/dL 33.3   RDW      10.0 - 15.0 % 13.0   Platelet Count      150 - 450  10e9/L 235   Diff Method       Automated Method   % Neutrophils      % 43.3   % Lymphocytes      % 41.0   % Monocytes      % 9.0   % Eosinophils      % 4.8   % Basophils      % 1.6   % Immature Granulocytes      % 0.3   Nucleated RBCs      0 /100 0   Absolute Neutrophil      1.6 - 8.3 10e9/L 1.6   Absolute Lymphocytes      0.8 - 5.3 10e9/L 1.5   Absolute Monocytes      0.0 - 1.3 10e9/L 0.3   Absolute Eosinophils      0.0 - 0.7 10e9/L 0.2   Absolute Basophils      0.0 - 0.2 10e9/L 0.1   Abs Immature Granulocytes      0 - 0.4 10e9/L 0.0   Absolute Nucleated RBC       0.0   Sodium      133 - 144 mmol/L 140   Potassium      3.4 - 5.3 mmol/L 3.6   Chloride      94 - 109 mmol/L 106   Carbon Dioxide      20 - 32 mmol/L 32   Anion Gap      3 - 14 mmol/L 2 (L)   Glucose      70 - 99 mg/dL 53 (L)   Urea Nitrogen      7 - 30 mg/dL 22   Creatinine      0.66 - 1.25 mg/dL 0.98   GFR Estimate      >60 mL/min/1.73:m2 >90   GFR Estimate If Black      >60 mL/min/1.73:m2 >90   Calcium      8.5 - 10.1 mg/dL 9.1   Bilirubin Total      0.2 - 1.3 mg/dL 0.6   Albumin      3.4 - 5.0 g/dL 4.4   Protein Total      6.8 - 8.8 g/dL 7.5   Alkaline Phosphatase      40 - 150 U/L 52   ALT      0 - 70 U/L 45   AST      0 - 45 U/L 28   Color Urine       Yellow   Appearance Urine       Clear   Glucose Urine      NEG:Negative mg/dL Negative   Bilirubin Urine      NEG:Negative Negative   Ketones Urine      NEG:Negative mg/dL Negative   Specific Gravity Urine      1.003 - 1.035 1.006   Blood Urine      NEG:Negative Negative   pH Urine      5.0 - 7.0 pH 7.0   Protein Albumin Urine      NEG:Negative mg/dL Negative   Urobilinogen mg/dL      0.0 - 2.0 mg/dL 0.0   Nitrite Urine      NEG:Negative Negative   Leukocyte Esterase Urine      NEG:Negative Negative   Source       Midstream Urine   WBC Urine      0 - 5 /HPF 0   RBC Urine      0 - 2 /HPF 0   Squamous Epithelial /HPF Urine      0 - 1 /HPF <1   Neutrophil Cytoplasmic Antibody      <1:10 titer <1:10    Neutrophil Cytoplasmic Antibody Pattern       The ANCA IFA is <1:10.  No further testing will be performed.   Protein Random Urine      g/L <0.05   Protein Total Urine g/gr Creatinine      0 - 0.2 g/g Cr Unable to calculate due to low value   Myeloperoxidase Antibody IgG      0.0 - 0.9 AI 4.7 (H)   Proteinase 3 Antibody IgG      0.0 - 0.9 AI <0.2   IGE      0 - 114 KIU/L 22   CRP Inflammation      0.0 - 8.0 mg/L <2.9   Sed Rate      0 - 15 mm/h 5   Creatinine Urine      mg/dL 24     Stable labs in 2/2021    Component      Latest Ref Rng & Units 11/16/2021   Color Urine      Colorless, Straw, Light Yellow, Yellow Yellow   Appearance Urine      Clear Clear   Glucose Urine      Negative mg/dL Negative   Bilirubin Urine      Negative Negative   Ketones Urine      Negative mg/dL Negative   Specific Gravity Urine      1.003 - 1.035 1.009   Blood Urine      Negative Negative   pH Urine      5.0 - 7.0 6.0   Protein Albumin Urine      Negative mg/dL Negative   Urobilinogen mg/dL      Normal, 2.0 mg/dL Normal   Nitrite Urine      Negative Negative   Leukocyte Esterase Urine      Negative Negative   RBC Urine      <=2 /HPF <1   WBC Urine      <=5 /HPF 0   MPO Zenaida IgG Instrument Value      <3.5 U/mL 17.0 (H)   Myeloperoxidase Antibody IgG      Negative Positive (A)   Proteinase 3 Zenaida IgG Instrument Value      <2.0 U/mL <1.0   Proteinase 3 Antibody IgG      Negative Negative   Protein Random Urine      g/L <0.05   Protein Total Urine g/gr Creatinine          Creatinine Urine      mg/dL 37   IGG      610-1,616 mg/dL 1,053   IGA      84 - 499 mg/dL 178   IGM      35 - 242 mg/dL 28 (L)   Creatinine      0.66 - 1.25 mg/dL 0.92   GFR Estimate      >60 mL/min/1.73m2 >90   CD19 B Cells      6 - 27 % 7   Absolute CD19      107 - 698 cells/uL 92 (L)   Neutrophil Cytoplasmic Antibody      <1:10 <1:10   Neutrophil Cytoplasmic Antibody Pattern       The ANCA IFA is <1:10.  No further testing will be performed.   AST      0 - 45 U/L 34    ALT      0 - 70 U/L 46   Albumin      3.4 - 5.0 g/dL 3.8   CRP Inflammation      0.0 - 8.0 mg/L <2.9   Sed Rate      0 - 15 mm/hr 10     Component      Latest Ref Rng & Units 2/1/2022   Color Urine      Colorless, Straw, Light Yellow, Yellow Yellow   Appearance Urine      Clear Clear   Glucose Urine      Negative mg/dL Negative   Bilirubin Urine      Negative Negative   Ketones Urine      Negative mg/dL Negative   Specific Gravity Urine      1.003 - 1.035 1.004   Blood Urine      Negative Negative   pH Urine      5.0 - 7.0 6.0   Protein Albumin Urine      Negative mg/dL Negative   Urobilinogen mg/dL      Normal, 2.0 mg/dL Normal   Nitrite Urine      Negative Negative   Leukocyte Esterase Urine      Negative Negative   RBC Urine      <=2 /HPF <1   WBC Urine      <=5 /HPF <1   MPO Zenaida IgG Instrument Value      <3.5 U/mL    Myeloperoxidase Antibody IgG      Negative    Proteinase 3 Zenaida IgG Instrument Value      <2.0 U/mL    Proteinase 3 Antibody IgG      Negative    Protein Random Urine      g/L <0.05   Protein Total Urine g/gr Creatinine          Creatinine Urine      mg/dL 24   IGG      610-1,616 mg/dL 1,146   IGA      84 - 499 mg/dL 168   IGM      35 - 242 mg/dL 30 (L)   Creatinine      0.66 - 1.25 mg/dL 1.06   GFR Estimate      >60 mL/min/1.73m2 89   CD19 B Cells      6 - 27 % 7   Absolute CD19      107 - 698 cells/uL 116   Neutrophil Cytoplasmic Antibody      <1:10 1:160 (H)   Neutrophil Cytoplasmic Antibody Pattern       Perinuclear ANCA (p-ANCA) staining pattern observed and confirmed on formalin-fixed neutrophils.   AST      0 - 45 U/L 38   ALT      0 - 70 U/L 40   Albumin      3.4 - 5.0 g/dL 3.7   CRP Inflammation      0.0 - 8.0 mg/L 7.4   Sed Rate      0 - 15 mm/hr 13     Component      Latest Ref Rng & Units 2/1/2022   WBC      4.0 - 11.0 10e3/uL 5.5   RBC Count      4.40 - 5.90 10e6/uL 4.39 (L)   Hemoglobin      13.3 - 17.7 g/dL 14.9   Hematocrit      40.0 - 53.0 % 42.8   MCV      78 - 100 fL 98   MCH       26.5 - 33.0 pg 33.9 (H)   MCHC      31.5 - 36.5 g/dL 34.8   RDW      10.0 - 15.0 % 12.2   Platelet Count      150 - 450 10e3/uL 257   % Neutrophils      % 54   % Lymphocytes      % 25   % Monocytes      % 10   % Eosinophils      % 9   % Basophils      % 2   % Immature Granulocytes      % 0   NRBCs per 100 WBC      <1 /100 0   Absolute Neutrophils      1.6 - 8.3 10e3/uL 3.0   Absolute Lymphocytes      0.8 - 5.3 10e3/uL 1.3   Absolute Monocytes      0.0 - 1.3 10e3/uL 0.6   Absolute Eosinophils      0.0 - 0.7 10e3/uL 0.5   Absolute Basophils      0.0 - 0.2 10e3/uL 0.1   Absolute Immature Granulocytes      <=0.4 10e3/uL 0.0   Absolute NRBCs      10e3/uL 0.0   Color Urine      Colorless, Straw, Light Yellow, Yellow Yellow   Appearance Urine      Clear Clear   Glucose Urine      Negative mg/dL Negative   Bilirubin Urine      Negative Negative   Ketones Urine      Negative mg/dL Negative   Specific Gravity Urine      1.003 - 1.035 1.004   Blood Urine      Negative Negative   pH Urine      5.0 - 7.0 6.0   Protein Albumin Urine      Negative mg/dL Negative   Urobilinogen mg/dL      Normal, 2.0 mg/dL Normal   Nitrite Urine      Negative Negative   Leukocyte Esterase Urine      Negative Negative   RBC Urine      <=2 /HPF <1   WBC Urine      <=5 /HPF <1   MPO Zenaida IgG Instrument Value      <3.5 U/mL 102.0 (H)   Myeloperoxidase Antibody IgG      Negative Positive (A)   Proteinase 3 Zenaida IgG Instrument Value      <2.0 U/mL <1.0   Proteinase 3 Antibody IgG      Negative Negative   IGG      610-1,616 mg/dL 1,146   IGA      84 - 499 mg/dL 168   IGM      35 - 242 mg/dL 30 (L)   Protein Random Urine      g/L <0.05   Protein Total Urine g/gr Creatinine          Creatinine Urine      mg/dL 24   Neutrophil Cytoplasmic Antibody      <1:10 1:160 (H)   Neutrophil Cytoplasmic Antibody Pattern       Perinuclear ANCA (p-ANCA) staining pattern observed and confirmed on formalin-fixed neutrophils.   CD19 B Cells      6 - 27 % 7   Absolute CD19       107 - 698 cells/uL 116   Creatinine      0.66 - 1.25 mg/dL 1.06   GFR Estimate      >60 mL/min/1.73m2 89   Sed Rate      0 - 15 mm/hr 13   CRP Inflammation      0.0 - 8.0 mg/L 7.4   Albumin      3.4 - 5.0 g/dL 3.7   ALT      0 - 70 U/L 40   AST      0 - 45 U/L 38     Component      Latest Ref Rng & Units 9/12/2022   WBC      4.0 - 11.0 10e3/uL 5.0   RBC Count      4.40 - 5.90 10e6/uL 4.50   Hemoglobin      13.3 - 17.7 g/dL 15.0   Hematocrit      40.0 - 53.0 % 43.5   MCV      78 - 100 fL 97   MCH      26.5 - 33.0 pg 33.3 (H)   MCHC      31.5 - 36.5 g/dL 34.5   RDW      10.0 - 15.0 % 12.5   Platelet Count      150 - 450 10e3/uL 221   % Neutrophils      % 49   % Lymphocytes      % 36   % Monocytes      % 12   % Eosinophils      % 1   % Basophils      % 1   % Immature Granulocytes      % 1   NRBCs per 100 WBC      <1 /100 0   Absolute Neutrophils      1.6 - 8.3 10e3/uL 2.4   Absolute Lymphocytes      0.8 - 5.3 10e3/uL 1.8   Absolute Monocytes      0.0 - 1.3 10e3/uL 0.6   Absolute Eosinophils      0.0 - 0.7 10e3/uL 0.0   Absolute Basophils      0.0 - 0.2 10e3/uL 0.1   Absolute Immature Granulocytes      <=0.4 10e3/uL 0.0   Absolute NRBCs      10e3/uL 0.0   Color Urine      Colorless, Straw, Light Yellow, Yellow Yellow   Appearance Urine      Clear Clear   Glucose Urine      Negative mg/dL Negative   Bilirubin Urine      Negative Negative   Ketones Urine      Negative mg/dL Negative   Specific Gravity Urine      1.003 - 1.035 1.005   Blood Urine      Negative Negative   pH Urine      5.0 - 7.0 5.0   Protein Albumin Urine      Negative mg/dL Negative   Urobilinogen mg/dL      Normal, 2.0 mg/dL Normal   Nitrite Urine      Negative Negative   Leukocyte Esterase Urine      Negative Negative   RBC Urine      <=2 /HPF 0   WBC Urine      <=5 /HPF 0   MPO Zenaida IgG Instrument Value      <3.5 U/mL 8.0 (H)   Myeloperoxidase Antibody IgG      Negative Positive (A)   Proteinase 3 Zenaida IgG Instrument Value      <2.0 U/mL <1.0    Proteinase 3 Antibody IgG      Negative Negative   Total Protein Urine mg/dL      mg/dL <6.0   Total Protein UR MG/MG CR          Creatinine Urine      mg/dL 20.3   IGG      610 - 1,616 mg/dL 906   IGA      84 - 499 mg/dL 160   IGM      35 - 242 mg/dL 32 (L)   Creatinine      0.67 - 1.17 mg/dL 1.06   GFR Estimate      >60 mL/min/1.73m2 89   CD19 B Cells      6 - 27 % 8   Absolute CD19      107 - 698 cells/uL 151   Neutrophil Cytoplasmic Antibody      <1:10 <1:10   Neutrophil Cytoplasmic Antibody Pattern       The ANCA IFA is <1:10.  No further testing will be performed.   AST      10 - 50 U/L 36   ALT      10 - 50 U/L 35   Albumin      3.5 - 5.2 g/dL 4.5   CRP Inflammation      <5.00 mg/L <3.00   Sed Rate      0 - 15 mm/hr 6     Component      Latest Ref Rng & Units 11/1/2022   WBC      4.0 - 11.0 10e3/uL 6.4   RBC Count      4.40 - 5.90 10e6/uL 4.44   Hemoglobin      13.3 - 17.7 g/dL 14.8   Hematocrit      40.0 - 53.0 % 42.7   MCV      78 - 100 fL 96   MCH      26.5 - 33.0 pg 33.3 (H)   MCHC      31.5 - 36.5 g/dL 34.7   RDW      10.0 - 15.0 % 12.5   Platelet Count      150 - 450 10e3/uL 247   % Neutrophils      % 62   % Lymphocytes      % 26   % Monocytes      % 9   % Eosinophils      % 2   % Basophils      % 1   % Immature Granulocytes      % 0   NRBCs per 100 WBC      <1 /100 0   Absolute Neutrophils      1.6 - 8.3 10e3/uL 4.0   Absolute Lymphocytes      0.8 - 5.3 10e3/uL 1.7   Absolute Monocytes      0.0 - 1.3 10e3/uL 0.6   Absolute Eosinophils      0.0 - 0.7 10e3/uL 0.1   Absolute Basophils      0.0 - 0.2 10e3/uL 0.1   Absolute Immature Granulocytes      <=0.4 10e3/uL 0.0   Absolute NRBCs      10e3/uL 0.0   Color Urine      Colorless, Straw, Light Yellow, Yellow Straw   Appearance Urine      Clear Clear   Glucose Urine      Negative mg/dL Negative   Bilirubin Urine      Negative Negative   Ketones Urine      Negative mg/dL Negative   Specific Gravity Urine      1.003 - 1.035 1.010   Blood Urine      Negative  Negative   pH Urine      5.0 - 7.0 6.0   Protein Albumin Urine      Negative mg/dL Negative   Urobilinogen mg/dL      Normal, 2.0 mg/dL Normal   Nitrite Urine      Negative Negative   Leukocyte Esterase Urine      Negative Negative   RBC Urine      <=2 /HPF 0   WBC Urine      <=5 /HPF <1   MPO Zenaida IgG Instrument Value      <3.5 U/mL 1.5   Myeloperoxidase Antibody IgG      Negative Negative   Proteinase 3 Zenaida IgG Instrument Value      <2.0 U/mL <1.0   Proteinase 3 Antibody IgG      Negative Negative   Total Protein Urine mg/dL      mg/dL <6.0   Total Protein UR MG/MG CR          Creatinine Urine      mg/dL 36.7   IGG      610 - 1,616 mg/dL 948   IGA      84 - 499 mg/dL 154   IGM      35 - 242 mg/dL 27 (L)   Creatinine      0.67 - 1.17 mg/dL 0.97   GFR Estimate      >60 mL/min/1.73m2 >90   CD19 B Cells      6 - 27 % 13   Absolute CD19      107 - 698 cells/uL 240   Neutrophil Cytoplasmic Antibody      <1:10 <1:10   Neutrophil Cytoplasmic Antibody Pattern       The ANCA IFA is <1:10.  No further testing will be performed.   AST      10 - 50 U/L 44   ALT      10 - 50 U/L 37   Albumin      3.5 - 5.2 g/dL 4.6   CRP Inflammation      <5.00 mg/L <3.00   Sed Rate      0 - 15 mm/hr 6

## 2023-03-22 NOTE — NURSING NOTE
Is the patient currently in the state of [place]? YES    Visit mode:VIDEO    If the visit is dropped, the patient can be reconnected by: VIDEO VISIT: Text to cell phone: [phone]    Will anyone else be joining the visit? NO      How would you like to obtain your AVS? MyChart    Are changes needed to the allergy or medication list? NO    Reason for visit: 2-3 month follow up

## 2023-03-22 NOTE — LETTER
Date:March 23, 2023      Provider requested that no letter be sent. Do not send.       [facility]

## 2023-06-06 ENCOUNTER — LAB (OUTPATIENT)
Dept: LAB | Facility: CLINIC | Age: 43
End: 2023-06-06
Payer: COMMERCIAL

## 2023-06-06 DIAGNOSIS — M30.1 EOSINOPHILIC GRANULOMATOSIS WITH POLYANGIITIS (EGPA) (H): ICD-10-CM

## 2023-06-06 DIAGNOSIS — D72.18 EOSINOPHILIC GRANULOMATOSIS WITH POLYANGIITIS (EGPA) (H): ICD-10-CM

## 2023-06-06 LAB
ALBUMIN MFR UR ELPH: <6 MG/DL (ref 1–14)
ALBUMIN SERPL BCG-MCNC: 4.3 G/DL (ref 3.5–5.2)
ALBUMIN UR-MCNC: NEGATIVE MG/DL
ALT SERPL W P-5'-P-CCNC: 47 U/L (ref 10–50)
APPEARANCE UR: CLEAR
AST SERPL W P-5'-P-CCNC: 41 U/L (ref 10–50)
BASOPHILS # BLD AUTO: 0.1 10E3/UL (ref 0–0.2)
BASOPHILS NFR BLD AUTO: 2 %
BILIRUB UR QL STRIP: NEGATIVE
CD19 B CELL COMMENT: NORMAL
CD19 CELLS # BLD: 182 CELLS/UL (ref 107–698)
CD19 CELLS NFR BLD: 11 % (ref 6–27)
COLOR UR AUTO: NORMAL
CREAT SERPL-MCNC: 0.89 MG/DL (ref 0.67–1.17)
CREAT UR-MCNC: 27.9 MG/DL
CRP SERPL-MCNC: 4.24 MG/L
EOSINOPHIL # BLD AUTO: 0.4 10E3/UL (ref 0–0.7)
EOSINOPHIL NFR BLD AUTO: 6 %
ERYTHROCYTE [DISTWIDTH] IN BLOOD BY AUTOMATED COUNT: 12.3 % (ref 10–15)
ERYTHROCYTE [SEDIMENTATION RATE] IN BLOOD BY WESTERGREN METHOD: 18 MM/HR (ref 0–15)
GFR SERPL CREATININE-BSD FRML MDRD: >90 ML/MIN/1.73M2
GLUCOSE UR STRIP-MCNC: NEGATIVE MG/DL
HCT VFR BLD AUTO: 40.6 % (ref 40–53)
HGB BLD-MCNC: 14 G/DL (ref 13.3–17.7)
HGB UR QL STRIP: NEGATIVE
IMM GRANULOCYTES # BLD: 0 10E3/UL
IMM GRANULOCYTES NFR BLD: 0 %
KETONES UR STRIP-MCNC: NEGATIVE MG/DL
LEUKOCYTE ESTERASE UR QL STRIP: NEGATIVE
LYMPHOCYTES # BLD AUTO: 1.7 10E3/UL (ref 0.8–5.3)
LYMPHOCYTES NFR BLD AUTO: 26 %
MCH RBC QN AUTO: 33.2 PG (ref 26.5–33)
MCHC RBC AUTO-ENTMCNC: 34.5 G/DL (ref 31.5–36.5)
MCV RBC AUTO: 96 FL (ref 78–100)
MONOCYTES # BLD AUTO: 0.5 10E3/UL (ref 0–1.3)
MONOCYTES NFR BLD AUTO: 8 %
NEUTROPHILS # BLD AUTO: 3.7 10E3/UL (ref 1.6–8.3)
NEUTROPHILS NFR BLD AUTO: 58 %
NITRATE UR QL: NEGATIVE
NRBC # BLD AUTO: 0 10E3/UL
NRBC BLD AUTO-RTO: 0 /100
PH UR STRIP: 5.5 [PH] (ref 5–7)
PLATELET # BLD AUTO: 325 10E3/UL (ref 150–450)
PROT/CREAT 24H UR: NORMAL MG/G{CREAT}
RBC # BLD AUTO: 4.22 10E6/UL (ref 4.4–5.9)
RBC URINE: <1 /HPF
SP GR UR STRIP: 1.01 (ref 1–1.03)
UROBILINOGEN UR STRIP-MCNC: NORMAL MG/DL
WBC # BLD AUTO: 6.3 10E3/UL (ref 4–11)
WBC URINE: <1 /HPF

## 2023-06-06 PROCEDURE — 85652 RBC SED RATE AUTOMATED: CPT | Performed by: PATHOLOGY

## 2023-06-06 PROCEDURE — 84450 TRANSFERASE (AST) (SGOT): CPT | Performed by: PATHOLOGY

## 2023-06-06 PROCEDURE — 99000 SPECIMEN HANDLING OFFICE-LAB: CPT | Performed by: PATHOLOGY

## 2023-06-06 PROCEDURE — 83516 IMMUNOASSAY NONANTIBODY: CPT | Mod: 90 | Performed by: PATHOLOGY

## 2023-06-06 PROCEDURE — 84156 ASSAY OF PROTEIN URINE: CPT | Performed by: PATHOLOGY

## 2023-06-06 PROCEDURE — 83876 ASSAY MYELOPEROXIDASE: CPT | Mod: 90 | Performed by: PATHOLOGY

## 2023-06-06 PROCEDURE — 86140 C-REACTIVE PROTEIN: CPT | Performed by: PATHOLOGY

## 2023-06-06 PROCEDURE — 85025 COMPLETE CBC W/AUTO DIFF WBC: CPT | Performed by: PATHOLOGY

## 2023-06-06 PROCEDURE — 86355 B CELLS TOTAL COUNT: CPT | Mod: 90 | Performed by: PATHOLOGY

## 2023-06-06 PROCEDURE — 81001 URINALYSIS AUTO W/SCOPE: CPT | Performed by: PATHOLOGY

## 2023-06-06 PROCEDURE — 82565 ASSAY OF CREATININE: CPT | Performed by: PATHOLOGY

## 2023-06-06 PROCEDURE — 82784 ASSAY IGA/IGD/IGG/IGM EACH: CPT | Mod: 90 | Performed by: PATHOLOGY

## 2023-06-06 PROCEDURE — 82040 ASSAY OF SERUM ALBUMIN: CPT | Performed by: PATHOLOGY

## 2023-06-06 PROCEDURE — 86036 ANCA SCREEN EACH ANTIBODY: CPT | Mod: 90 | Performed by: PATHOLOGY

## 2023-06-06 PROCEDURE — 84460 ALANINE AMINO (ALT) (SGPT): CPT | Performed by: PATHOLOGY

## 2023-06-06 PROCEDURE — 86037 ANCA TITER EACH ANTIBODY: CPT | Mod: 90 | Performed by: PATHOLOGY

## 2023-06-06 PROCEDURE — 36415 COLL VENOUS BLD VENIPUNCTURE: CPT | Performed by: PATHOLOGY

## 2023-06-07 LAB
ANCA AB PATTERN SER IF-IMP: ABNORMAL
C-ANCA TITR SER IF: ABNORMAL {TITER}
IGA SERPL-MCNC: 183 MG/DL (ref 84–499)
IGG SERPL-MCNC: 1204 MG/DL (ref 610–1616)
IGM SERPL-MCNC: 49 MG/DL (ref 35–242)

## 2023-06-09 LAB
MYELOPEROXIDASE AB SER IA-ACNC: 12 U/ML
MYELOPEROXIDASE AB SER IA-ACNC: POSITIVE
PROTEINASE3 AB SER IA-ACNC: <1 U/ML
PROTEINASE3 AB SER IA-ACNC: NEGATIVE

## 2023-07-19 ENCOUNTER — VIRTUAL VISIT (OUTPATIENT)
Dept: RHEUMATOLOGY | Facility: CLINIC | Age: 43
End: 2023-07-19
Attending: INTERNAL MEDICINE
Payer: COMMERCIAL

## 2023-07-19 VITALS — WEIGHT: 185 LBS | BODY MASS INDEX: 28.04 KG/M2 | HEIGHT: 68 IN

## 2023-07-19 DIAGNOSIS — M30.1 EOSINOPHILIC GRANULOMATOSIS WITH POLYANGIITIS (EGPA) (H): Primary | ICD-10-CM

## 2023-07-19 DIAGNOSIS — Z51.81 MEDICATION MONITORING ENCOUNTER: ICD-10-CM

## 2023-07-19 DIAGNOSIS — D72.18 EOSINOPHILIC GRANULOMATOSIS WITH POLYANGIITIS (EGPA) (H): Primary | ICD-10-CM

## 2023-07-19 PROCEDURE — 99213 OFFICE O/P EST LOW 20 MIN: CPT | Mod: VID | Performed by: INTERNAL MEDICINE

## 2023-07-19 ASSESSMENT — PAIN SCALES - GENERAL: PAINLEVEL: NO PAIN (0)

## 2023-07-19 NOTE — PROGRESS NOTES
Virtual Visit Details    Type of service:  Video Visit 1:07 pm-1:21 pm    Originating Location (pt. 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 He has previously failed methotrexate, azathioprine, Nucala and, most recently, MMF due to recurrent sinusitis and respiratory symptoms.     11/2022: Specifically, methotrexate 8 tabs weekly was associated with recurrent URI with no improvement in symptoms on 6 tabs weekly. He also then transitioned to AZA in 8/2019 (NL TPMT) but was put on hold in 11/2019 given recurrent sinusitis and antibiotic tx, ultimately requiring sinus surgery/polypectomy on 12/10/2019. He retried AZA 50 mg bid on 3/2020 but had a transient elevated LFTs and improvement of MPO even before resuming AZA. He subsequently trialed Nucala on 2/2022 but discontinued on 9/2022 due to persistent sinusitis and symptoms flares. 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    At this time, the patient is still completing his antibiotic course for Moraxella sinusitis and would prefer to hold off on further immunosuppression. He does feel that his symptoms are controlled at present on antibiotics and Medrol dose pack. We discussed risks and benefits of not restarting immunosuppression, and he was agreeable to considering a retrial of MMF pending further discussion at follow-up in 3 months. Exam today was otherwise unremarkable, and labs were  notable for unremarkable inflammatory markers and ANCA.     3/22/2023: Resumed  mg bid recently, was on it for about 3 wk, but started having sinus sx 2 wk ago, stopped MMF (cellcept), took medrol dose pack, no antibiotic needed this time. Stable labs from 11/2022 with neg ANCA and normal ESR/CRP, no asthma flares and reportedly almost no recurrence of nasal polyps. Feeling well today. Will check labs this month and monitor off MMF for flares. If he continues to flare up with sinus sx, will consider a trial of rituximab. Risks were discussed.      Today 7/19/2023: stable with stable labs (except slight elevation of vasculitis markers), will monitor off tx. Recent labs were reviewed and discussed.    Today's Plan:    Continue follow-up with ENT    Labs (just ANCA, MPO) in 9/2023 and every 3 months      Return in 6 months (video), could be re-scheduled if no complaints    Orders Placed This Encounter   Procedures     Myeloperoxidase and Proteinase 3 Panel     ANCA IgG by IFA with Reflex to Titer       Isabella Manley MD    HPI:    8/20/2021: On AZA 50 mg bid since end of 3/2020. Tolerates it well. Reports no recurrence of nasal polyps. He will do follow up with ENT. Labs from 2/2021 are stable. His EGPA seems to be stable. Reports seasonal allergies at this time of the year, using inhalor. Nasal congestion got better by missing AZA x 3 days.    We discussed covid booster vaccine, no official recommendation for J&J receivers yet but most likely will need a booster being immunocompromised.    2/4/2022: Flaring with EGPA, sinus inflammation, increased vasculitis markers. On and off AZA due to sinus infections. Recommend to switch to nucala to control EGPA; risks were discussed. Will treat with prednisone taper.    8/18/2022: Unclear if sinus sx are due to vasculitis and if he fails nucala or not, will get ENT opinion and check labs.    9/23/2022: Labs are stable, but clinically he continues to flare with  sinusitis, nasal polyps, steroid responsive. Since he failed nucala, recommend to stop. Discussed next tx options cellcept versus rituximab. Agreed to try cellcept as safer option next; risks were discussed.    11/30/2022:    Patient reports that he discontinued MMF 2 weeks ago due to worsening congestion, post nasal drip, cough and shortness of breath with subsequent ENT evaluation revealing nasal culture positive sinus infection on 11/11/22. He was started on an unspecified antibiotic on 11/16 and began PRN Medrol dose pack provided by Rheumatology staff with immediate improvement in symptoms. He now continues to have aforementioned symptoms but reduced in severity.    No other acute complaints when seen today. Patient overall reports feeling that symptoms are controlled on antibiotics and steroids and would prefer to wait on restarting MMF or other immunosuppressive regimen at this time.       3/22/2023:     Tried MMF again for about 3wk [place], got recurrence of sinus sx, tried medrol dose pack and stopped cellcept, this was 2 wk ago, medrol helped.    Asthma is well controlled on Symbicort inh only, has not required rescue inhaler.     No new sx of rash, neuropathy, joint pain.    Feeling well today.    Today 7/19/2023:    -Sometimes sinuses feels plugged on especially with moaning the lawn, uses Symbicort once-twice a day, uses rescue inhaler less often.    -No more steroid use since last visit    -No recent infection needing antibiotic        History of Presenting Illness    2/4/2022: Had 2 sinusitis, went on 2 rounds of antibiotics. Had 3 different bacteria on the culture. Off antibiotic x 2 weeks, resumed AZA x past 2 weeks. Still gets PND and coughs a lot. ENT saw inflammation on exam with no infection, this was about 2.5 wk ago when he was recommended to resume AZA. Was off AZA from 9/2021 till two weeks ago.  1st and 2nd round of antibiotic, did not come off AZA, just with  3rd round. Has some SOB, uses inhalers more. Gets winded more with exercise.     8/19/2022: His nose gets stuffy on lying down. Saw ENT and was given prednisone and it helped some, but cough and congestion is coming back off prednisone. Took last prednisone last week. It was 40 mg every day max. Seasonal allergies are triggers. Sometimes it is harder to breathe when he is working out.     9/23/2022: Lucio presents for follow up, continues to have flares of sinusitis with nasal polyps despite being on nucala, which are steroid responsive.    Mj Georgeantoni Brown. 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    He is doing fairly well. No recurrence of nasal polyps on imuran. Last seen by ENT in 12/2020, has upcoming appointment. Reports having seasonal allergies at this time of the year, has been using his inhalers x past 2 weeks. Reports some nasal congestion, it was better when he ran out of AZA x 3 days early this week.    He sleeps at the side, has block nostril, was better on medrol dose pack.    Medrol dose pack finished yesterday. Last antibiotic was a month ago. Medrol works better, prednisone does not help.    Uses inhaler to prevent asthma.    No joint pain or skin rash.    Had surgery for R shoulder rotator cuff full tear which was successful.    Had J&J covid vaccine on 4/8/2021, tolerated it well.    Family History   Problem Relation Age of Onset     Colon Cancer Maternal Grandmother      Stomach Cancer Paternal Grandmother      No Known Problems Mother      No Known Problems Father      No Known Problems Maternal Grandfather      No Known Problems Paternal Grandfather      No Known Problems Brother      No Known Problems Sister      No Known Problems Son      No Known Problems Daughter      No Known Problems Maternal Half-Brother      No Known Problems Maternal Half-Sister      No Known Problems Paternal Half-Brother      No Known Problems Paternal Half-Sister      No Known  Problems Niece      No Known Problems Nephew      No Known Problems Cousin      No Known Problems Other      Cancer No family hx of      Diabetes No family hx of      Heart Disease No family hx of      Hypertension No family hx of      Cerebrovascular Disease No family hx of      Asthma No family hx of      Thyroid Disease No family hx of      Depression No family hx of      Mental Illness No family hx of      Substance Abuse No family hx of      Dementia No family hx of      Bleeding Disorder No family hx of      Congenital Anomalies No family hx of      Migraines No family hx of      Stomach Problem No family hx of      Muscular Disorder No family hx of      Osteoporosis No family hx of      Unknown/Adopted No family hx of      Social History     Socioeconomic History     Marital status:      Spouse name: Not on file     Number of children: 2     Years of education: Not on file     Highest education level: Not on file   Occupational History     Occupation:  manager   Tobacco Use     Smoking status: Never     Smokeless tobacco: Never   Substance and Sexual Activity     Alcohol use: Yes     Drug use: Never     Sexual activity: Not on file   Other Topics Concern     Not on file   Social History Narrative     Not on file     Social Determinants of Health     Financial Resource Strain: Not on file   Food Insecurity: Not on file   Transportation Needs: Not on file   Physical Activity: Not on file   Stress: Not on file   Social Connections: Not on file   Intimate Partner Violence: Not on file   Housing Stability: Not on file       No Known Allergies    Outpatient Encounter Medications as of 7/19/2023   Medication Sig Dispense Refill     albuterol (PROAIR HFA/PROVENTIL HFA/VENTOLIN HFA) 108 (90 Base) MCG/ACT inhaler as needed        budesonide (PULMICORT) 1 MG/2ML neb solution Take 1 mg by nebulization daily        montelukast (SINGULAIR) 10 MG tablet Take 10 mg by mouth At Bedtime        SYMBICORT 160-4.5  MCG/ACT Inhaler Inhale 2 puffs into the lungs 2 times daily        budesonide (PULMICORT) 0.5 MG/2ML neb solution  (Patient not taking: Reported on 3/22/2023)       cetirizine (ZYRTEC) 10 MG tablet Take 10 mg by mouth daily  (Patient not taking: Reported on 9/23/2022)       clindamycin phosphate (EVOCLIN) 1 % external foam daily  (Patient not taking: Reported on 9/23/2022)       diclofenac (VOLTAREN) 1 % topical gel Apply 2 g topically 4 times daily as needed for moderate pain To use over R shoulder (Patient not taking: Reported on 9/23/2022) 100 g 1     FLOVENT  MCG/ACT inhaler Inhale 1 puff into the lungs daily  (Patient not taking: Reported on 9/23/2022)       levocetirizine (XYZAL) 5 MG tablet Take 5 mg by mouth every morning (Patient not taking: Reported on 3/22/2023)       methylPREDNISolone (MEDROL DOSEPAK) 4 MG tablet therapy pack Take per package instructions. As needed for flare up (Patient not taking: Reported on 3/22/2023) 21 tablet 3     tretinoin (RETIN-A) 0.1 % external cream Apply topically as needed  (Patient not taking: Reported on 9/23/2022)       No facility-administered encounter medications on file as of 7/19/2023.       ROS:  A comprehensive ROS was done, positives are per HPI.    Ph.E:    Constitutional: WD/WN. Pleasant. In no acute distress.  HEENT: EOM intact, sclera anicteric, conj not injected. No saddle nose deformity  MS: No synovitis  Skin: No skin rash  Neuro: A&O x 3  Psych: NL affect     His records were reviewed.    4/2019: pos MPO, p-ANCA    7/2019: NL ESR/CRP    Component      Latest Ref Rng & Units 3/25/2020   WBC      4.0 - 11.0 10e9/L 3.8 (L)   RBC Count      4.4 - 5.9 10e12/L 4.53   Hemoglobin      13.3 - 17.7 g/dL 15.1   Hematocrit      40.0 - 53.0 % 45.3   MCV      78 - 100 fl 100   MCH      26.5 - 33.0 pg 33.3 (H)   MCHC      31.5 - 36.5 g/dL 33.3   RDW      10.0 - 15.0 % 13.0   Platelet Count      150 - 450 10e9/L 235   Diff Method       Automated Method   %  Neutrophils      % 43.3   % Lymphocytes      % 41.0   % Monocytes      % 9.0   % Eosinophils      % 4.8   % Basophils      % 1.6   % Immature Granulocytes      % 0.3   Nucleated RBCs      0 /100 0   Absolute Neutrophil      1.6 - 8.3 10e9/L 1.6   Absolute Lymphocytes      0.8 - 5.3 10e9/L 1.5   Absolute Monocytes      0.0 - 1.3 10e9/L 0.3   Absolute Eosinophils      0.0 - 0.7 10e9/L 0.2   Absolute Basophils      0.0 - 0.2 10e9/L 0.1   Abs Immature Granulocytes      0 - 0.4 10e9/L 0.0   Absolute Nucleated RBC       0.0   Sodium      133 - 144 mmol/L 140   Potassium      3.4 - 5.3 mmol/L 3.6   Chloride      94 - 109 mmol/L 106   Carbon Dioxide      20 - 32 mmol/L 32   Anion Gap      3 - 14 mmol/L 2 (L)   Glucose      70 - 99 mg/dL 53 (L)   Urea Nitrogen      7 - 30 mg/dL 22   Creatinine      0.66 - 1.25 mg/dL 0.98   GFR Estimate      >60 mL/min/1.73:m2 >90   GFR Estimate If Black      >60 mL/min/1.73:m2 >90   Calcium      8.5 - 10.1 mg/dL 9.1   Bilirubin Total      0.2 - 1.3 mg/dL 0.6   Albumin      3.4 - 5.0 g/dL 4.4   Protein Total      6.8 - 8.8 g/dL 7.5   Alkaline Phosphatase      40 - 150 U/L 52   ALT      0 - 70 U/L 45   AST      0 - 45 U/L 28   Color Urine       Yellow   Appearance Urine       Clear   Glucose Urine      NEG:Negative mg/dL Negative   Bilirubin Urine      NEG:Negative Negative   Ketones Urine      NEG:Negative mg/dL Negative   Specific Gravity Urine      1.003 - 1.035 1.006   Blood Urine      NEG:Negative Negative   pH Urine      5.0 - 7.0 pH 7.0   Protein Albumin Urine      NEG:Negative mg/dL Negative   Urobilinogen mg/dL      0.0 - 2.0 mg/dL 0.0   Nitrite Urine      NEG:Negative Negative   Leukocyte Esterase Urine      NEG:Negative Negative   Source       Midstream Urine   WBC Urine      0 - 5 /HPF 0   RBC Urine      0 - 2 /HPF 0   Squamous Epithelial /HPF Urine      0 - 1 /HPF <1   Neutrophil Cytoplasmic Antibody      <1:10 titer <1:10   Neutrophil Cytoplasmic Antibody Pattern       The ANCA  IFA is <1:10.  No further testing will be performed.   Protein Random Urine      g/L <0.05   Protein Total Urine g/gr Creatinine      0 - 0.2 g/g Cr Unable to calculate due to low value   Myeloperoxidase Antibody IgG      0.0 - 0.9 AI 4.7 (H)   Proteinase 3 Antibody IgG      0.0 - 0.9 AI <0.2   IGE      0 - 114 KIU/L 22   CRP Inflammation      0.0 - 8.0 mg/L <2.9   Sed Rate      0 - 15 mm/h 5   Creatinine Urine      mg/dL 24     Stable labs in 2/2021    Component      Latest Ref Rng & Units 11/16/2021   Color Urine      Colorless, Straw, Light Yellow, Yellow Yellow   Appearance Urine      Clear Clear   Glucose Urine      Negative mg/dL Negative   Bilirubin Urine      Negative Negative   Ketones Urine      Negative mg/dL Negative   Specific Gravity Urine      1.003 - 1.035 1.009   Blood Urine      Negative Negative   pH Urine      5.0 - 7.0 6.0   Protein Albumin Urine      Negative mg/dL Negative   Urobilinogen mg/dL      Normal, 2.0 mg/dL Normal   Nitrite Urine      Negative Negative   Leukocyte Esterase Urine      Negative Negative   RBC Urine      <=2 /HPF <1   WBC Urine      <=5 /HPF 0   MPO Zenaida IgG Instrument Value      <3.5 U/mL 17.0 (H)   Myeloperoxidase Antibody IgG      Negative Positive (A)   Proteinase 3 Zenaida IgG Instrument Value      <2.0 U/mL <1.0   Proteinase 3 Antibody IgG      Negative Negative   Protein Random Urine      g/L <0.05   Protein Total Urine g/gr Creatinine          Creatinine Urine      mg/dL 37   IGG      610-1,616 mg/dL 1,053   IGA      84 - 499 mg/dL 178   IGM      35 - 242 mg/dL 28 (L)   Creatinine      0.66 - 1.25 mg/dL 0.92   GFR Estimate      >60 mL/min/1.73m2 >90   CD19 B Cells      6 - 27 % 7   Absolute CD19      107 - 698 cells/uL 92 (L)   Neutrophil Cytoplasmic Antibody      <1:10 <1:10   Neutrophil Cytoplasmic Antibody Pattern       The ANCA IFA is <1:10.  No further testing will be performed.   AST      0 - 45 U/L 34   ALT      0 - 70 U/L 46   Albumin      3.4 - 5.0 g/dL 3.8    CRP Inflammation      0.0 - 8.0 mg/L <2.9   Sed Rate      0 - 15 mm/hr 10     Component      Latest Ref Rng & Units 2/1/2022   Color Urine      Colorless, Straw, Light Yellow, Yellow Yellow   Appearance Urine      Clear Clear   Glucose Urine      Negative mg/dL Negative   Bilirubin Urine      Negative Negative   Ketones Urine      Negative mg/dL Negative   Specific Gravity Urine      1.003 - 1.035 1.004   Blood Urine      Negative Negative   pH Urine      5.0 - 7.0 6.0   Protein Albumin Urine      Negative mg/dL Negative   Urobilinogen mg/dL      Normal, 2.0 mg/dL Normal   Nitrite Urine      Negative Negative   Leukocyte Esterase Urine      Negative Negative   RBC Urine      <=2 /HPF <1   WBC Urine      <=5 /HPF <1   MPO Zenaida IgG Instrument Value      <3.5 U/mL    Myeloperoxidase Antibody IgG      Negative    Proteinase 3 Zenaida IgG Instrument Value      <2.0 U/mL    Proteinase 3 Antibody IgG      Negative    Protein Random Urine      g/L <0.05   Protein Total Urine g/gr Creatinine          Creatinine Urine      mg/dL 24   IGG      610-1,616 mg/dL 1,146   IGA      84 - 499 mg/dL 168   IGM      35 - 242 mg/dL 30 (L)   Creatinine      0.66 - 1.25 mg/dL 1.06   GFR Estimate      >60 mL/min/1.73m2 89   CD19 B Cells      6 - 27 % 7   Absolute CD19      107 - 698 cells/uL 116   Neutrophil Cytoplasmic Antibody      <1:10 1:160 (H)   Neutrophil Cytoplasmic Antibody Pattern       Perinuclear ANCA (p-ANCA) staining pattern observed and confirmed on formalin-fixed neutrophils.   AST      0 - 45 U/L 38   ALT      0 - 70 U/L 40   Albumin      3.4 - 5.0 g/dL 3.7   CRP Inflammation      0.0 - 8.0 mg/L 7.4   Sed Rate      0 - 15 mm/hr 13     Component      Latest Ref Rng & Units 2/1/2022   WBC      4.0 - 11.0 10e3/uL 5.5   RBC Count      4.40 - 5.90 10e6/uL 4.39 (L)   Hemoglobin      13.3 - 17.7 g/dL 14.9   Hematocrit      40.0 - 53.0 % 42.8   MCV      78 - 100 fL 98   MCH      26.5 - 33.0 pg 33.9 (H)   MCHC      31.5 - 36.5 g/dL 34.8    RDW      10.0 - 15.0 % 12.2   Platelet Count      150 - 450 10e3/uL 257   % Neutrophils      % 54   % Lymphocytes      % 25   % Monocytes      % 10   % Eosinophils      % 9   % Basophils      % 2   % Immature Granulocytes      % 0   NRBCs per 100 WBC      <1 /100 0   Absolute Neutrophils      1.6 - 8.3 10e3/uL 3.0   Absolute Lymphocytes      0.8 - 5.3 10e3/uL 1.3   Absolute Monocytes      0.0 - 1.3 10e3/uL 0.6   Absolute Eosinophils      0.0 - 0.7 10e3/uL 0.5   Absolute Basophils      0.0 - 0.2 10e3/uL 0.1   Absolute Immature Granulocytes      <=0.4 10e3/uL 0.0   Absolute NRBCs      10e3/uL 0.0   Color Urine      Colorless, Straw, Light Yellow, Yellow Yellow   Appearance Urine      Clear Clear   Glucose Urine      Negative mg/dL Negative   Bilirubin Urine      Negative Negative   Ketones Urine      Negative mg/dL Negative   Specific Gravity Urine      1.003 - 1.035 1.004   Blood Urine      Negative Negative   pH Urine      5.0 - 7.0 6.0   Protein Albumin Urine      Negative mg/dL Negative   Urobilinogen mg/dL      Normal, 2.0 mg/dL Normal   Nitrite Urine      Negative Negative   Leukocyte Esterase Urine      Negative Negative   RBC Urine      <=2 /HPF <1   WBC Urine      <=5 /HPF <1   MPO Zenaida IgG Instrument Value      <3.5 U/mL 102.0 (H)   Myeloperoxidase Antibody IgG      Negative Positive (A)   Proteinase 3 Zenaida IgG Instrument Value      <2.0 U/mL <1.0   Proteinase 3 Antibody IgG      Negative Negative   IGG      610-1,616 mg/dL 1,146   IGA      84 - 499 mg/dL 168   IGM      35 - 242 mg/dL 30 (L)   Protein Random Urine      g/L <0.05   Protein Total Urine g/gr Creatinine          Creatinine Urine      mg/dL 24   Neutrophil Cytoplasmic Antibody      <1:10 1:160 (H)   Neutrophil Cytoplasmic Antibody Pattern       Perinuclear ANCA (p-ANCA) staining pattern observed and confirmed on formalin-fixed neutrophils.   CD19 B Cells      6 - 27 % 7   Absolute CD19      107 - 698 cells/uL 116   Creatinine      0.66 - 1.25  mg/dL 1.06   GFR Estimate      >60 mL/min/1.73m2 89   Sed Rate      0 - 15 mm/hr 13   CRP Inflammation      0.0 - 8.0 mg/L 7.4   Albumin      3.4 - 5.0 g/dL 3.7   ALT      0 - 70 U/L 40   AST      0 - 45 U/L 38     Component      Latest Ref Rng & Units 9/12/2022   WBC      4.0 - 11.0 10e3/uL 5.0   RBC Count      4.40 - 5.90 10e6/uL 4.50   Hemoglobin      13.3 - 17.7 g/dL 15.0   Hematocrit      40.0 - 53.0 % 43.5   MCV      78 - 100 fL 97   MCH      26.5 - 33.0 pg 33.3 (H)   MCHC      31.5 - 36.5 g/dL 34.5   RDW      10.0 - 15.0 % 12.5   Platelet Count      150 - 450 10e3/uL 221   % Neutrophils      % 49   % Lymphocytes      % 36   % Monocytes      % 12   % Eosinophils      % 1   % Basophils      % 1   % Immature Granulocytes      % 1   NRBCs per 100 WBC      <1 /100 0   Absolute Neutrophils      1.6 - 8.3 10e3/uL 2.4   Absolute Lymphocytes      0.8 - 5.3 10e3/uL 1.8   Absolute Monocytes      0.0 - 1.3 10e3/uL 0.6   Absolute Eosinophils      0.0 - 0.7 10e3/uL 0.0   Absolute Basophils      0.0 - 0.2 10e3/uL 0.1   Absolute Immature Granulocytes      <=0.4 10e3/uL 0.0   Absolute NRBCs      10e3/uL 0.0   Color Urine      Colorless, Straw, Light Yellow, Yellow Yellow   Appearance Urine      Clear Clear   Glucose Urine      Negative mg/dL Negative   Bilirubin Urine      Negative Negative   Ketones Urine      Negative mg/dL Negative   Specific Gravity Urine      1.003 - 1.035 1.005   Blood Urine      Negative Negative   pH Urine      5.0 - 7.0 5.0   Protein Albumin Urine      Negative mg/dL Negative   Urobilinogen mg/dL      Normal, 2.0 mg/dL Normal   Nitrite Urine      Negative Negative   Leukocyte Esterase Urine      Negative Negative   RBC Urine      <=2 /HPF 0   WBC Urine      <=5 /HPF 0   MPO Zenaida IgG Instrument Value      <3.5 U/mL 8.0 (H)   Myeloperoxidase Antibody IgG      Negative Positive (A)   Proteinase 3 Zenaida IgG Instrument Value      <2.0 U/mL <1.0   Proteinase 3 Antibody IgG      Negative Negative   Total  Protein Urine mg/dL      mg/dL <6.0   Total Protein UR MG/MG CR          Creatinine Urine      mg/dL 20.3   IGG      610 - 1,616 mg/dL 906   IGA      84 - 499 mg/dL 160   IGM      35 - 242 mg/dL 32 (L)   Creatinine      0.67 - 1.17 mg/dL 1.06   GFR Estimate      >60 mL/min/1.73m2 89   CD19 B Cells      6 - 27 % 8   Absolute CD19      107 - 698 cells/uL 151   Neutrophil Cytoplasmic Antibody      <1:10 <1:10   Neutrophil Cytoplasmic Antibody Pattern       The ANCA IFA is <1:10.  No further testing will be performed.   AST      10 - 50 U/L 36   ALT      10 - 50 U/L 35   Albumin      3.5 - 5.2 g/dL 4.5   CRP Inflammation      <5.00 mg/L <3.00   Sed Rate      0 - 15 mm/hr 6     Component      Latest Ref Rng & Units 11/1/2022   WBC      4.0 - 11.0 10e3/uL 6.4   RBC Count      4.40 - 5.90 10e6/uL 4.44   Hemoglobin      13.3 - 17.7 g/dL 14.8   Hematocrit      40.0 - 53.0 % 42.7   MCV      78 - 100 fL 96   MCH      26.5 - 33.0 pg 33.3 (H)   MCHC      31.5 - 36.5 g/dL 34.7   RDW      10.0 - 15.0 % 12.5   Platelet Count      150 - 450 10e3/uL 247   % Neutrophils      % 62   % Lymphocytes      % 26   % Monocytes      % 9   % Eosinophils      % 2   % Basophils      % 1   % Immature Granulocytes      % 0   NRBCs per 100 WBC      <1 /100 0   Absolute Neutrophils      1.6 - 8.3 10e3/uL 4.0   Absolute Lymphocytes      0.8 - 5.3 10e3/uL 1.7   Absolute Monocytes      0.0 - 1.3 10e3/uL 0.6   Absolute Eosinophils      0.0 - 0.7 10e3/uL 0.1   Absolute Basophils      0.0 - 0.2 10e3/uL 0.1   Absolute Immature Granulocytes      <=0.4 10e3/uL 0.0   Absolute NRBCs      10e3/uL 0.0   Color Urine      Colorless, Straw, Light Yellow, Yellow Straw   Appearance Urine      Clear Clear   Glucose Urine      Negative mg/dL Negative   Bilirubin Urine      Negative Negative   Ketones Urine      Negative mg/dL Negative   Specific Gravity Urine      1.003 - 1.035 1.010   Blood Urine      Negative Negative   pH Urine      5.0 - 7.0 6.0   Protein Albumin  Urine      Negative mg/dL Negative   Urobilinogen mg/dL      Normal, 2.0 mg/dL Normal   Nitrite Urine      Negative Negative   Leukocyte Esterase Urine      Negative Negative   RBC Urine      <=2 /HPF 0   WBC Urine      <=5 /HPF <1   MPO Zenaida IgG Instrument Value      <3.5 U/mL 1.5   Myeloperoxidase Antibody IgG      Negative Negative   Proteinase 3 Zenaida IgG Instrument Value      <2.0 U/mL <1.0   Proteinase 3 Antibody IgG      Negative Negative   Total Protein Urine mg/dL      mg/dL <6.0   Total Protein UR MG/MG CR          Creatinine Urine      mg/dL 36.7   IGG      610 - 1,616 mg/dL 948   IGA      84 - 499 mg/dL 154   IGM      35 - 242 mg/dL 27 (L)   Creatinine      0.67 - 1.17 mg/dL 0.97   GFR Estimate      >60 mL/min/1.73m2 >90   CD19 B Cells      6 - 27 % 13   Absolute CD19      107 - 698 cells/uL 240   Neutrophil Cytoplasmic Antibody      <1:10 <1:10   Neutrophil Cytoplasmic Antibody Pattern       The ANCA IFA is <1:10.  No further testing will be performed.   AST      10 - 50 U/L 44   ALT      10 - 50 U/L 37   Albumin      3.5 - 5.2 g/dL 4.6   CRP Inflammation      <5.00 mg/L <3.00   Sed Rate      0 - 15 mm/hr 6     Component      Latest Ref [facility] 6/6/2023  11:57 AM   WBC      4.0 - 11.0 10e3/uL 6.3    RBC Count      4.40 - 5.90 10e6/uL 4.22 (L)    Hemoglobin      13.3 - 17.7 g/dL 14.0    Hematocrit      40.0 - 53.0 % 40.6    MCV      78 - 100 fL 96    MCH      26.5 - 33.0 pg 33.2 (H)    MCHC      31.5 - 36.5 g/dL 34.5    RDW      10.0 - 15.0 % 12.3    Platelet Count      150 - 450 10e3/uL 325    % Neutrophils      % 58    % Lymphocytes      % 26    % Monocytes      % 8    % Eosinophils      % 6    % Basophils      % 2    % Immature Granulocytes      % 0    NRBCs per 100 WBC      <1 /100 0    Absolute Neutrophils      1.6 - 8.3 10e3/uL 3.7    Absolute Lymphocytes      0.8 - 5.3 10e3/uL 1.7    Absolute Monocytes      0.0 - 1.3 10e3/uL 0.5    Absolute Eosinophils      0.0 - 0.7 10e3/uL 0.4    Absolute Basophils       0.0 - 0.2 10e3/uL 0.1    Absolute Immature Granulocytes      <=0.4 10e3/uL 0.0    Absolute NRBCs      10e3/uL 0.0    Color Urine      Colorless, Straw, Light Yellow, Yellow     Appearance Urine      Clear     Glucose Urine      Negative mg/dL    Bilirubin Urine      Negative     Ketones Urine      Negative mg/dL    Specific Gravity Urine      1.003 - 1.035     Blood Urine      Negative     pH Urine      5.0 - 7.0     Protein Albumin Urine      Negative mg/dL    Urobilinogen mg/dL      Normal, 2.0 mg/dL    Nitrite Urine      Negative     Leukocyte Esterase Urine      Negative     RBC Urine      <=2 /HPF    WBC Urine      <=5 /HPF    MPO Zenaida IgG Instrument Value      <3.5 U/mL 12.0 (H)    Myeloperoxidase Antibody IgG      Negative  Positive !    Proteinase 3 Zenaida IgG Instrument Value      <2.0 U/mL <1.0    Proteinase 3 Antibody IgG      Negative  Negative    Total Protein Urine mg/dL      1.0 - 14.0 mg/dL    Total Protein UR MG/MG CR    Creatinine Urine      mg/dL    CD19 B Cells      6 - 27 % 11    Absolute CD19      107 - 698 cells/uL 182    IGG      610 - 1,616 mg/dL 1,204    IGA      84 - 499 mg/dL 183    IGM      35 - 242 mg/dL 49    Creatinine      0.67 - 1.17 mg/dL 0.89    GFR Estimate      >60 mL/min/1.73m2 >90    Neutrophil Cytoplasmic Antibody      <1:10  1:10 (H)    Neutrophil Cytoplasmic Antibody Pattern Cytoplasmic ANCA (c-ANCA) staining pattern observed and confirmed on formalin-fixed neutrophils.    AST      10 - 50 U/L 41    ALT      10 - 50 U/L 47    Albumin      3.5 - 5.2 g/dL 4.3    CRP Inflammation      <5.00 mg/L 4.24    Sed Rate      0 - 15 mm/hr 18 (H)      Component      Latest Ref [facility] 6/6/2023  12:00 PM   WBC      4.0 - 11.0 10e3/uL    RBC Count      4.40 - 5.90 10e6/uL    Hemoglobin      13.3 - 17.7 g/dL    Hematocrit      40.0 - 53.0 %    MCV      78 - 100 fL    MCH      26.5 - 33.0 pg    MCHC      31.5 - 36.5 g/dL    RDW      10.0 - 15.0 %    Platelet Count      150 - 450 10e3/uL    %  Neutrophils      %    % Lymphocytes      %    % Monocytes      %    % Eosinophils      %    % Basophils      %    % Immature Granulocytes      %    NRBCs per 100 WBC      <1 /100    Absolute Neutrophils      1.6 - 8.3 10e3/uL    Absolute Lymphocytes      0.8 - 5.3 10e3/uL    Absolute Monocytes      0.0 - 1.3 10e3/uL    Absolute Eosinophils      0.0 - 0.7 10e3/uL    Absolute Basophils      0.0 - 0.2 10e3/uL    Absolute Immature Granulocytes      <=0.4 10e3/uL    Absolute NRBCs      10e3/uL    Color Urine      Colorless, Straw, Light Yellow, Yellow  Straw    Appearance Urine      Clear  Clear    Glucose Urine      Negative mg/dL Negative    Bilirubin Urine      Negative  Negative    Ketones Urine      Negative mg/dL Negative    Specific Gravity Urine      1.003 - 1.035  1.009    Blood Urine      Negative  Negative    pH Urine      5.0 - 7.0  5.5    Protein Albumin Urine      Negative mg/dL Negative    Urobilinogen mg/dL      Normal, 2.0 mg/dL Normal    Nitrite Urine      Negative  Negative    Leukocyte Esterase Urine      Negative  Negative    RBC Urine      <=2 /HPF <1    WBC Urine      <=5 /HPF <1    MPO Zenaida IgG Instrument Value      <3.5 U/mL    Myeloperoxidase Antibody IgG      Negative     Proteinase 3 Zenaida IgG Instrument Value      <2.0 U/mL    Proteinase 3 Antibody IgG      Negative     Total Protein Urine mg/dL      1.0 - 14.0 mg/dL <6.0    Total Protein UR MG/MG CR --    Creatinine Urine      mg/dL 27.9    CD19 B Cells      6 - 27 %    Absolute CD19      107 - 698 cells/uL    IGG      610 - 1,616 mg/dL    IGA      84 - 499 mg/dL    IGM      35 - 242 mg/dL    Creatinine      0.67 - 1.17 mg/dL    GFR Estimate      >60 mL/min/1.73m2    Neutrophil Cytoplasmic Antibody      <1:10     Neutrophil Cytoplasmic Antibody Pattern    AST      10 - 50 U/L    ALT      10 - 50 U/L    Albumin      3.5 - 5.2 g/dL    CRP Inflammation      <5.00 mg/L    Sed Rate      0 - 15 mm/hr       Legend:  (L) Low  (H) High  ! Abnormal

## 2023-07-19 NOTE — NURSING NOTE
Patient is also taking a Multi vitamin      Is the patient currently in the state of [place]? [place], [place] where patient lives.     Visit mode:VIDEO    If the visit is dropped, the patient can be reconnected by: VIDEO VISIT: Text to cell phone: [phone]    Will anyone else be joining the visit? NO      How would you like to obtain your AVS? MyChart    Are changes needed to the allergy or medication list? YES: ADD-  Multi vitamin    Reason for visit: JUSTUS [place], VF

## 2023-07-19 NOTE — PATIENT INSTRUCTIONS
Labs (just ANCA, MPO) in 9/2023 and every 3 months      Return in 6 months (video), could be re-scheduled if no complaints

## 2023-07-19 NOTE — LETTER
7/19/2023       RE: Mj Gallegosmichael Brown.  [address]  [place] [address]     Dear Colleague,    Thank you for referring your patient, Mj Mason Jr., to the [facility] [place] at [facility]. Please see a copy of my visit note below.    Virtual Visit Details    Type of service:  Video Visit 1:07 pm-1:21 pm    Originating Location (pt. 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 He has previously failed methotrexate, azathioprine, Nucala and, most recently, MMF due to recurrent sinusitis and respiratory symptoms.     11/2022: Specifically, methotrexate 8 tabs weekly was associated with recurrent URI with no improvement in symptoms on 6 tabs weekly. He also then transitioned to AZA in 8/2019 (NL TPMT) but was put on hold in 11/2019 given recurrent sinusitis and antibiotic tx, ultimately requiring sinus surgery/polypectomy on 12/10/2019. He retried AZA 50 mg bid on 3/2020 but had a transient elevated LFTs and improvement of MPO even before resuming AZA. He subsequently trialed Nucala on 2/2022 but discontinued on 9/2022 due to persistent sinusitis and symptoms flares. 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    At this time, the patient is still completing his antibiotic course for Moraxella sinusitis and would prefer to hold off on further immunosuppression. He  does feel that his symptoms are controlled at present on antibiotics and Medrol dose pack. We discussed risks and benefits of not restarting immunosuppression, and he was agreeable to considering a retrial of MMF pending further discussion at follow-up in 3 months. Exam today was otherwise unremarkable, and labs were notable for unremarkable inflammatory markers and ANCA.     3/22/2023: Resumed  mg bid recently, was on it for about 3 wk, but started having sinus sx 2 wk ago, stopped MMF (cellcept), took medrol dose pack, no antibiotic needed this time. Stable labs from 11/2022 with neg ANCA and normal ESR/CRP, no asthma flares and reportedly almost no recurrence of nasal polyps. Feeling well today. Will check labs this month and monitor off MMF for flares. If he continues to flare up with sinus sx, will consider a trial of rituximab. Risks were discussed.      Today 7/19/2023: stable with stable labs (except slight elevation of vasculitis markers), will monitor off tx. Recent labs were reviewed and discussed.    Today's Plan:    Continue follow-up with ENT    Labs (just ANCA, MPO) in 9/2023 and every 3 months      Return in 6 months (video), could be re-scheduled if no complaints    Orders Placed This Encounter   Procedures    Myeloperoxidase and Proteinase 3 Panel    ANCA IgG by IFA with Reflex to Titer       Isabella Manley MD    HPI:    8/20/2021: On AZA 50 mg bid since end of 3/2020. Tolerates it well. Reports no recurrence of nasal polyps. He will do follow up with ENT. Labs from 2/2021 are stable. His EGPA seems to be stable. Reports seasonal allergies at this time of the year, using inhalor. Nasal congestion got better by missing AZA x 3 days.    We discussed covid booster vaccine, no official recommendation for J&J receivers yet but most likely will need a booster being immunocompromised.    2/4/2022: Flaring with EGPA, sinus inflammation, increased vasculitis markers. On and off AZA due to sinus  infections. Recommend to switch to nucala to control EGPA; risks were discussed. Will treat with prednisone taper.    8/18/2022: Unclear if sinus sx are due to vasculitis and if he fails nucala or not, will get ENT opinion and check labs.    9/23/2022: Labs are stable, but clinically he continues to flare with sinusitis, nasal polyps, steroid responsive. Since he failed nucala, recommend to stop. Discussed next tx options cellcept versus rituximab. Agreed to try cellcept as safer option next; risks were discussed.    11/30/2022:    Patient reports that he discontinued MMF 2 weeks ago due to worsening congestion, post nasal drip, cough and shortness of breath with subsequent ENT evaluation revealing nasal culture positive sinus infection on 11/11/22. He was started on an unspecified antibiotic on 11/16 and began PRN Medrol dose pack provided by Rheumatology staff with immediate improvement in symptoms. He now continues to have aforementioned symptoms but reduced in severity.    No other acute complaints when seen today. Patient overall reports feeling that symptoms are controlled on antibiotics and steroids and would prefer to wait on restarting MMF or other immunosuppressive regimen at this time.       3/22/2023:     Tried MMF again for about 3wk [place], got recurrence of sinus sx, tried medrol dose pack and stopped cellcept, this was 2 wk ago, medrol helped.    Asthma is well controlled on Symbicort inh only, has not required rescue inhaler.     No new sx of rash, neuropathy, joint pain.    Feeling well today.    Today 7/19/2023:    -Sometimes sinuses feels plugged on especially with moaning the lawn, uses Symbicort once-twice a day, uses rescue inhaler less often.    -No more steroid use since last visit    -No recent infection needing antibiotic        History of Presenting Illness    2/4/2022: Had 2 sinusitis, went on 2 rounds of antibiotics. Had 3 different bacteria on the  culture. Off antibiotic x 2 weeks, resumed AZA x past 2 weeks. Still gets PND and coughs a lot. ENT saw inflammation on exam with no infection, this was about 2.5 wk ago when he was recommended to resume AZA. Was off AZA from 9/2021 till two weeks ago.  1st and 2nd round of antibiotic, did not come off AZA, just with 3rd round. Has some SOB, uses inhalers more. Gets winded more with exercise.     8/19/2022: His nose gets stuffy on lying down. Saw ENT and was given prednisone and it helped some, but cough and congestion is coming back off prednisone. Took last prednisone last week. It was 40 mg every day max. Seasonal allergies are triggers. Sometimes it is harder to breathe when he is working out.     9/23/2022: Lucio presents for follow up, continues to have flares of sinusitis with nasal polyps despite being on nucala, which are steroid responsive.    Mj Isabella Brown. 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    He is doing fairly well. No recurrence of nasal polyps on imuran. Last seen by ENT in 12/2020, has upcoming appointment. Reports having seasonal allergies at this time of the year, has been using his inhalers x past 2 weeks. Reports some nasal congestion, it was better when he ran out of AZA x 3 days early this week.    He sleeps at the side, has block nostril, was better on medrol dose pack.    Medrol dose pack finished yesterday. Last antibiotic was a month ago. Medrol works better, prednisone does not help.    Uses inhaler to prevent asthma.    No joint pain or skin rash.    Had surgery for R shoulder rotator cuff full tear which was successful.    Had J&J covid vaccine on 4/8/2021, tolerated it well.    Family History   Problem Relation Age of Onset    Colon Cancer Maternal Grandmother     Stomach Cancer Paternal Grandmother     No Known Problems Mother     No Known Problems Father     No Known Problems Maternal Grandfather     No Known Problems Paternal Grandfather     No  Known Problems Brother     No Known Problems Sister     No Known Problems Son     No Known Problems Daughter     No Known Problems Maternal Half-Brother     No Known Problems Maternal Half-Sister     No Known Problems Paternal Half-Brother     No Known Problems Paternal Half-Sister     No Known Problems Niece     No Known Problems Nephew     No Known Problems Cousin     No Known Problems Other     Cancer No family hx of     Diabetes No family hx of     Heart Disease No family hx of     Hypertension No family hx of     Cerebrovascular Disease No family hx of     Asthma No family hx of     Thyroid Disease No family hx of     Depression No family hx of     Mental Illness No family hx of     Substance Abuse No family hx of     Dementia No family hx of     Bleeding Disorder No family hx of     Congenital Anomalies No family hx of     Migraines No family hx of     Stomach Problem No family hx of     Muscular Disorder No family hx of     Osteoporosis No family hx of     Unknown/Adopted No family hx of      Social History     Socioeconomic History    Marital status:      Spouse name: Not on file    Number of children: 2    Years of education: Not on file    Highest education level: Not on file   Occupational History    Occupation:  manager   Tobacco Use    Smoking status: Never    Smokeless tobacco: Never   Substance and Sexual Activity    Alcohol use: Yes    Drug use: Never    Sexual activity: Not on file   Other Topics Concern    Not on file   Social History Narrative    Not on file     Social Determinants of Health     Financial Resource Strain: Not on file   Food Insecurity: Not on file   Transportation Needs: Not on file   Physical Activity: Not on file   Stress: Not on file   Social Connections: Not on file   Intimate Partner Violence: Not on file   Housing Stability: Not on file       No Known Allergies    Outpatient Encounter Medications as of 7/19/2023   Medication Sig Dispense Refill    albuterol  (PROAIR HFA/PROVENTIL HFA/VENTOLIN HFA) 108 (90 Base) MCG/ACT inhaler as needed       budesonide (PULMICORT) 1 MG/2ML neb solution Take 1 mg by nebulization daily       montelukast (SINGULAIR) 10 MG tablet Take 10 mg by mouth At Bedtime       SYMBICORT 160-4.5 MCG/ACT Inhaler Inhale 2 puffs into the lungs 2 times daily       budesonide (PULMICORT) 0.5 MG/2ML neb solution  (Patient not taking: Reported on 3/22/2023)      cetirizine (ZYRTEC) 10 MG tablet Take 10 mg by mouth daily  (Patient not taking: Reported on 9/23/2022)      clindamycin phosphate (EVOCLIN) 1 % external foam daily  (Patient not taking: Reported on 9/23/2022)      diclofenac (VOLTAREN) 1 % topical gel Apply 2 g topically 4 times daily as needed for moderate pain To use over R shoulder (Patient not taking: Reported on 9/23/2022) 100 g 1    FLOVENT  MCG/ACT inhaler Inhale 1 puff into the lungs daily  (Patient not taking: Reported on 9/23/2022)      levocetirizine (XYZAL) 5 MG tablet Take 5 mg by mouth every morning (Patient not taking: Reported on 3/22/2023)      methylPREDNISolone (MEDROL DOSEPAK) 4 MG tablet therapy pack Take per package instructions. As needed for flare up (Patient not taking: Reported on 3/22/2023) 21 tablet 3    tretinoin (RETIN-A) 0.1 % external cream Apply topically as needed  (Patient not taking: Reported on 9/23/2022)       No facility-administered encounter medications on file as of 7/19/2023.       ROS:  A comprehensive ROS was done, positives are per HPI.    Ph.E:    Constitutional: WD/WN. Pleasant. In no acute distress.  HEENT: EOM intact, sclera anicteric, conj not injected. No saddle nose deformity  MS: No synovitis  Skin: No skin rash  Neuro: A&O x 3  Psych: NL affect     His records were reviewed.    4/2019: pos MPO, p-ANCA    7/2019: NL ESR/CRP    Component      Latest Ref Rng & Units 3/25/2020   WBC      4.0 - 11.0 10e9/L 3.8 (L)   RBC Count      4.4 - 5.9 10e12/L 4.53   Hemoglobin      13.3 - 17.7 g/dL 15.1    Hematocrit      40.0 - 53.0 % 45.3   MCV      78 - 100 fl 100   MCH      26.5 - 33.0 pg 33.3 (H)   MCHC      31.5 - 36.5 g/dL 33.3   RDW      10.0 - 15.0 % 13.0   Platelet Count      150 - 450 10e9/L 235   Diff Method       Automated Method   % Neutrophils      % 43.3   % Lymphocytes      % 41.0   % Monocytes      % 9.0   % Eosinophils      % 4.8   % Basophils      % 1.6   % Immature Granulocytes      % 0.3   Nucleated RBCs      0 /100 0   Absolute Neutrophil      1.6 - 8.3 10e9/L 1.6   Absolute Lymphocytes      0.8 - 5.3 10e9/L 1.5   Absolute Monocytes      0.0 - 1.3 10e9/L 0.3   Absolute Eosinophils      0.0 - 0.7 10e9/L 0.2   Absolute Basophils      0.0 - 0.2 10e9/L 0.1   Abs Immature Granulocytes      0 - 0.4 10e9/L 0.0   Absolute Nucleated RBC       0.0   Sodium      133 - 144 mmol/L 140   Potassium      3.4 - 5.3 mmol/L 3.6   Chloride      94 - 109 mmol/L 106   Carbon Dioxide      20 - 32 mmol/L 32   Anion Gap      3 - 14 mmol/L 2 (L)   Glucose      70 - 99 mg/dL 53 (L)   Urea Nitrogen      7 - 30 mg/dL 22   Creatinine      0.66 - 1.25 mg/dL 0.98   GFR Estimate      >60 mL/min/1.73:m2 >90   GFR Estimate If Black      >60 mL/min/1.73:m2 >90   Calcium      8.5 - 10.1 mg/dL 9.1   Bilirubin Total      0.2 - 1.3 mg/dL 0.6   Albumin      3.4 - 5.0 g/dL 4.4   Protein Total      6.8 - 8.8 g/dL 7.5   Alkaline Phosphatase      40 - 150 U/L 52   ALT      0 - 70 U/L 45   AST      0 - 45 U/L 28   Color Urine       Yellow   Appearance Urine       Clear   Glucose Urine      NEG:Negative mg/dL Negative   Bilirubin Urine      NEG:Negative Negative   Ketones Urine      NEG:Negative mg/dL Negative   Specific Gravity Urine      1.003 - 1.035 1.006   Blood Urine      NEG:Negative Negative   pH Urine      5.0 - 7.0 pH 7.0   Protein Albumin Urine      NEG:Negative mg/dL Negative   Urobilinogen mg/dL      0.0 - 2.0 mg/dL 0.0   Nitrite Urine      NEG:Negative Negative   Leukocyte Esterase Urine      NEG:Negative Negative   Source        Midstream Urine   WBC Urine      0 - 5 /HPF 0   RBC Urine      0 - 2 /HPF 0   Squamous Epithelial /HPF Urine      0 - 1 /HPF <1   Neutrophil Cytoplasmic Antibody      <1:10 titer <1:10   Neutrophil Cytoplasmic Antibody Pattern       The ANCA IFA is <1:10.  No further testing will be performed.   Protein Random Urine      g/L <0.05   Protein Total Urine g/gr Creatinine      0 - 0.2 g/g Cr Unable to calculate due to low value   Myeloperoxidase Antibody IgG      0.0 - 0.9 AI 4.7 (H)   Proteinase 3 Antibody IgG      0.0 - 0.9 AI <0.2   IGE      0 - 114 KIU/L 22   CRP Inflammation      0.0 - 8.0 mg/L <2.9   Sed Rate      0 - 15 mm/h 5   Creatinine Urine      mg/dL 24     Stable labs in 2/2021    Component      Latest Ref Rng & Units 11/16/2021   Color Urine      Colorless, Straw, Light Yellow, Yellow Yellow   Appearance Urine      Clear Clear   Glucose Urine      Negative mg/dL Negative   Bilirubin Urine      Negative Negative   Ketones Urine      Negative mg/dL Negative   Specific Gravity Urine      1.003 - 1.035 1.009   Blood Urine      Negative Negative   pH Urine      5.0 - 7.0 6.0   Protein Albumin Urine      Negative mg/dL Negative   Urobilinogen mg/dL      Normal, 2.0 mg/dL Normal   Nitrite Urine      Negative Negative   Leukocyte Esterase Urine      Negative Negative   RBC Urine      <=2 /HPF <1   WBC Urine      <=5 /HPF 0   MPO Zenaida IgG Instrument Value      <3.5 U/mL 17.0 (H)   Myeloperoxidase Antibody IgG      Negative Positive (A)   Proteinase 3 Zenaida IgG Instrument Value      <2.0 U/mL <1.0   Proteinase 3 Antibody IgG      Negative Negative   Protein Random Urine      g/L <0.05   Protein Total Urine g/gr Creatinine          Creatinine Urine      mg/dL 37   IGG      610-1,616 mg/dL 1,053   IGA      84 - 499 mg/dL 178   IGM      35 - 242 mg/dL 28 (L)   Creatinine      0.66 - 1.25 mg/dL 0.92   GFR Estimate      >60 mL/min/1.73m2 >90   CD19 B Cells      6 - 27 % 7   Absolute CD19      107 - 698 cells/uL 92 (L)    Neutrophil Cytoplasmic Antibody      <1:10 <1:10   Neutrophil Cytoplasmic Antibody Pattern       The ANCA IFA is <1:10.  No further testing will be performed.   AST      0 - 45 U/L 34   ALT      0 - 70 U/L 46   Albumin      3.4 - 5.0 g/dL 3.8   CRP Inflammation      0.0 - 8.0 mg/L <2.9   Sed Rate      0 - 15 mm/hr 10     Component      Latest Ref Rng & Units 2/1/2022   Color Urine      Colorless, Straw, Light Yellow, Yellow Yellow   Appearance Urine      Clear Clear   Glucose Urine      Negative mg/dL Negative   Bilirubin Urine      Negative Negative   Ketones Urine      Negative mg/dL Negative   Specific Gravity Urine      1.003 - 1.035 1.004   Blood Urine      Negative Negative   pH Urine      5.0 - 7.0 6.0   Protein Albumin Urine      Negative mg/dL Negative   Urobilinogen mg/dL      Normal, 2.0 mg/dL Normal   Nitrite Urine      Negative Negative   Leukocyte Esterase Urine      Negative Negative   RBC Urine      <=2 /HPF <1   WBC Urine      <=5 /HPF <1   MPO Zenaida IgG Instrument Value      <3.5 U/mL    Myeloperoxidase Antibody IgG      Negative    Proteinase 3 Zenaida IgG Instrument Value      <2.0 U/mL    Proteinase 3 Antibody IgG      Negative    Protein Random Urine      g/L <0.05   Protein Total Urine g/gr Creatinine          Creatinine Urine      mg/dL 24   IGG      610-1,616 mg/dL 1,146   IGA      84 - 499 mg/dL 168   IGM      35 - 242 mg/dL 30 (L)   Creatinine      0.66 - 1.25 mg/dL 1.06   GFR Estimate      >60 mL/min/1.73m2 89   CD19 B Cells      6 - 27 % 7   Absolute CD19      107 - 698 cells/uL 116   Neutrophil Cytoplasmic Antibody      <1:10 1:160 (H)   Neutrophil Cytoplasmic Antibody Pattern       Perinuclear ANCA (p-ANCA) staining pattern observed and confirmed on formalin-fixed neutrophils.   AST      0 - 45 U/L 38   ALT      0 - 70 U/L 40   Albumin      3.4 - 5.0 g/dL 3.7   CRP Inflammation      0.0 - 8.0 mg/L 7.4   Sed Rate      0 - 15 mm/hr 13     Component      Latest Ref Rng & Units 2/1/2022    WBC      4.0 - 11.0 10e3/uL 5.5   RBC Count      4.40 - 5.90 10e6/uL 4.39 (L)   Hemoglobin      13.3 - 17.7 g/dL 14.9   Hematocrit      40.0 - 53.0 % 42.8   MCV      78 - 100 fL 98   MCH      26.5 - 33.0 pg 33.9 (H)   MCHC      31.5 - 36.5 g/dL 34.8   RDW      10.0 - 15.0 % 12.2   Platelet Count      150 - 450 10e3/uL 257   % Neutrophils      % 54   % Lymphocytes      % 25   % Monocytes      % 10   % Eosinophils      % 9   % Basophils      % 2   % Immature Granulocytes      % 0   NRBCs per 100 WBC      <1 /100 0   Absolute Neutrophils      1.6 - 8.3 10e3/uL 3.0   Absolute Lymphocytes      0.8 - 5.3 10e3/uL 1.3   Absolute Monocytes      0.0 - 1.3 10e3/uL 0.6   Absolute Eosinophils      0.0 - 0.7 10e3/uL 0.5   Absolute Basophils      0.0 - 0.2 10e3/uL 0.1   Absolute Immature Granulocytes      <=0.4 10e3/uL 0.0   Absolute NRBCs      10e3/uL 0.0   Color Urine      Colorless, Straw, Light Yellow, Yellow Yellow   Appearance Urine      Clear Clear   Glucose Urine      Negative mg/dL Negative   Bilirubin Urine      Negative Negative   Ketones Urine      Negative mg/dL Negative   Specific Gravity Urine      1.003 - 1.035 1.004   Blood Urine      Negative Negative   pH Urine      5.0 - 7.0 6.0   Protein Albumin Urine      Negative mg/dL Negative   Urobilinogen mg/dL      Normal, 2.0 mg/dL Normal   Nitrite Urine      Negative Negative   Leukocyte Esterase Urine      Negative Negative   RBC Urine      <=2 /HPF <1   WBC Urine      <=5 /HPF <1   MPO Zenaida IgG Instrument Value      <3.5 U/mL 102.0 (H)   Myeloperoxidase Antibody IgG      Negative Positive (A)   Proteinase 3 Zenaida IgG Instrument Value      <2.0 U/mL <1.0   Proteinase 3 Antibody IgG      Negative Negative   IGG      610-1,616 mg/dL 1,146   IGA      84 - 499 mg/dL 168   IGM      35 - 242 mg/dL 30 (L)   Protein Random Urine      g/L <0.05   Protein Total Urine g/gr Creatinine          Creatinine Urine      mg/dL 24   Neutrophil Cytoplasmic Antibody      <1:10 1:160 (H)    Neutrophil Cytoplasmic Antibody Pattern       Perinuclear ANCA (p-ANCA) staining pattern observed and confirmed on formalin-fixed neutrophils.   CD19 B Cells      6 - 27 % 7   Absolute CD19      107 - 698 cells/uL 116   Creatinine      0.66 - 1.25 mg/dL 1.06   GFR Estimate      >60 mL/min/1.73m2 89   Sed Rate      0 - 15 mm/hr 13   CRP Inflammation      0.0 - 8.0 mg/L 7.4   Albumin      3.4 - 5.0 g/dL 3.7   ALT      0 - 70 U/L 40   AST      0 - 45 U/L 38     Component      Latest Ref Rng & Units 9/12/2022   WBC      4.0 - 11.0 10e3/uL 5.0   RBC Count      4.40 - 5.90 10e6/uL 4.50   Hemoglobin      13.3 - 17.7 g/dL 15.0   Hematocrit      40.0 - 53.0 % 43.5   MCV      78 - 100 fL 97   MCH      26.5 - 33.0 pg 33.3 (H)   MCHC      31.5 - 36.5 g/dL 34.5   RDW      10.0 - 15.0 % 12.5   Platelet Count      150 - 450 10e3/uL 221   % Neutrophils      % 49   % Lymphocytes      % 36   % Monocytes      % 12   % Eosinophils      % 1   % Basophils      % 1   % Immature Granulocytes      % 1   NRBCs per 100 WBC      <1 /100 0   Absolute Neutrophils      1.6 - 8.3 10e3/uL 2.4   Absolute Lymphocytes      0.8 - 5.3 10e3/uL 1.8   Absolute Monocytes      0.0 - 1.3 10e3/uL 0.6   Absolute Eosinophils      0.0 - 0.7 10e3/uL 0.0   Absolute Basophils      0.0 - 0.2 10e3/uL 0.1   Absolute Immature Granulocytes      <=0.4 10e3/uL 0.0   Absolute NRBCs      10e3/uL 0.0   Color Urine      Colorless, Straw, Light Yellow, Yellow Yellow   Appearance Urine      Clear Clear   Glucose Urine      Negative mg/dL Negative   Bilirubin Urine      Negative Negative   Ketones Urine      Negative mg/dL Negative   Specific Gravity Urine      1.003 - 1.035 1.005   Blood Urine      Negative Negative   pH Urine      5.0 - 7.0 5.0   Protein Albumin Urine      Negative mg/dL Negative   Urobilinogen mg/dL      Normal, 2.0 mg/dL Normal   Nitrite Urine      Negative Negative   Leukocyte Esterase Urine      Negative Negative   RBC Urine      <=2 /HPF 0   WBC Urine       <=5 /HPF 0   MPO Zenaida IgG Instrument Value      <3.5 U/mL 8.0 (H)   Myeloperoxidase Antibody IgG      Negative Positive (A)   Proteinase 3 Zenaida IgG Instrument Value      <2.0 U/mL <1.0   Proteinase 3 Antibody IgG      Negative Negative   Total Protein Urine mg/dL      mg/dL <6.0   Total Protein UR MG/MG CR          Creatinine Urine      mg/dL 20.3   IGG      610 - 1,616 mg/dL 906   IGA      84 - 499 mg/dL 160   IGM      35 - 242 mg/dL 32 (L)   Creatinine      0.67 - 1.17 mg/dL 1.06   GFR Estimate      >60 mL/min/1.73m2 89   CD19 B Cells      6 - 27 % 8   Absolute CD19      107 - 698 cells/uL 151   Neutrophil Cytoplasmic Antibody      <1:10 <1:10   Neutrophil Cytoplasmic Antibody Pattern       The ANCA IFA is <1:10.  No further testing will be performed.   AST      10 - 50 U/L 36   ALT      10 - 50 U/L 35   Albumin      3.5 - 5.2 g/dL 4.5   CRP Inflammation      <5.00 mg/L <3.00   Sed Rate      0 - 15 mm/hr 6     Component      Latest Ref Rng & Units 11/1/2022   WBC      4.0 - 11.0 10e3/uL 6.4   RBC Count      4.40 - 5.90 10e6/uL 4.44   Hemoglobin      13.3 - 17.7 g/dL 14.8   Hematocrit      40.0 - 53.0 % 42.7   MCV      78 - 100 fL 96   MCH      26.5 - 33.0 pg 33.3 (H)   MCHC      31.5 - 36.5 g/dL 34.7   RDW      10.0 - 15.0 % 12.5   Platelet Count      150 - 450 10e3/uL 247   % Neutrophils      % 62   % Lymphocytes      % 26   % Monocytes      % 9   % Eosinophils      % 2   % Basophils      % 1   % Immature Granulocytes      % 0   NRBCs per 100 WBC      <1 /100 0   Absolute Neutrophils      1.6 - 8.3 10e3/uL 4.0   Absolute Lymphocytes      0.8 - 5.3 10e3/uL 1.7   Absolute Monocytes      0.0 - 1.3 10e3/uL 0.6   Absolute Eosinophils      0.0 - 0.7 10e3/uL 0.1   Absolute Basophils      0.0 - 0.2 10e3/uL 0.1   Absolute Immature Granulocytes      <=0.4 10e3/uL 0.0   Absolute NRBCs      10e3/uL 0.0   Color Urine      Colorless, Straw, Light Yellow, Yellow Straw   Appearance Urine      Clear Clear   Glucose Urine       Negative mg/dL Negative   Bilirubin Urine      Negative Negative   Ketones Urine      Negative mg/dL Negative   Specific Gravity Urine      1.003 - 1.035 1.010   Blood Urine      Negative Negative   pH Urine      5.0 - 7.0 6.0   Protein Albumin Urine      Negative mg/dL Negative   Urobilinogen mg/dL      Normal, 2.0 mg/dL Normal   Nitrite Urine      Negative Negative   Leukocyte Esterase Urine      Negative Negative   RBC Urine      <=2 /HPF 0   WBC Urine      <=5 /HPF <1   MPO Zenaida IgG Instrument Value      <3.5 U/mL 1.5   Myeloperoxidase Antibody IgG      Negative Negative   Proteinase 3 Zenaida IgG Instrument Value      <2.0 U/mL <1.0   Proteinase 3 Antibody IgG      Negative Negative   Total Protein Urine mg/dL      mg/dL <6.0   Total Protein UR MG/MG CR          Creatinine Urine      mg/dL 36.7   IGG      610 - 1,616 mg/dL 948   IGA      84 - 499 mg/dL 154   IGM      35 - 242 mg/dL 27 (L)   Creatinine      0.67 - 1.17 mg/dL 0.97   GFR Estimate      >60 mL/min/1.73m2 >90   CD19 B Cells      6 - 27 % 13   Absolute CD19      107 - 698 cells/uL 240   Neutrophil Cytoplasmic Antibody      <1:10 <1:10   Neutrophil Cytoplasmic Antibody Pattern       The ANCA IFA is <1:10.  No further testing will be performed.   AST      10 - 50 U/L 44   ALT      10 - 50 U/L 37   Albumin      3.5 - 5.2 g/dL 4.6   CRP Inflammation      <5.00 mg/L <3.00   Sed Rate      0 - 15 mm/hr 6     Component      Latest Ref [facility] 6/6/2023  11:57 AM   WBC      4.0 - 11.0 10e3/uL 6.3    RBC Count      4.40 - 5.90 10e6/uL 4.22 (L)    Hemoglobin      13.3 - 17.7 g/dL 14.0    Hematocrit      40.0 - 53.0 % 40.6    MCV      78 - 100 fL 96    MCH      26.5 - 33.0 pg 33.2 (H)    MCHC      31.5 - 36.5 g/dL 34.5    RDW      10.0 - 15.0 % 12.3    Platelet Count      150 - 450 10e3/uL 325    % Neutrophils      % 58    % Lymphocytes      % 26    % Monocytes      % 8    % Eosinophils      % 6    % Basophils      % 2    % Immature Granulocytes      % 0    NRBCs per 100  WBC      <1 /100 0    Absolute Neutrophils      1.6 - 8.3 10e3/uL 3.7    Absolute Lymphocytes      0.8 - 5.3 10e3/uL 1.7    Absolute Monocytes      0.0 - 1.3 10e3/uL 0.5    Absolute Eosinophils      0.0 - 0.7 10e3/uL 0.4    Absolute Basophils      0.0 - 0.2 10e3/uL 0.1    Absolute Immature Granulocytes      <=0.4 10e3/uL 0.0    Absolute NRBCs      10e3/uL 0.0    Color Urine      Colorless, Straw, Light Yellow, Yellow     Appearance Urine      Clear     Glucose Urine      Negative mg/dL    Bilirubin Urine      Negative     Ketones Urine      Negative mg/dL    Specific Gravity Urine      1.003 - 1.035     Blood Urine      Negative     pH Urine      5.0 - 7.0     Protein Albumin Urine      Negative mg/dL    Urobilinogen mg/dL      Normal, 2.0 mg/dL    Nitrite Urine      Negative     Leukocyte Esterase Urine      Negative     RBC Urine      <=2 /HPF    WBC Urine      <=5 /HPF    MPO Zenaida IgG Instrument Value      <3.5 U/mL 12.0 (H)    Myeloperoxidase Antibody IgG      Negative  Positive !    Proteinase 3 Zenaida IgG Instrument Value      <2.0 U/mL <1.0    Proteinase 3 Antibody IgG      Negative  Negative    Total Protein Urine mg/dL      1.0 - 14.0 mg/dL    Total Protein UR MG/MG CR    Creatinine Urine      mg/dL    CD19 B Cells      6 - 27 % 11    Absolute CD19      107 - 698 cells/uL 182    IGG      610 - 1,616 mg/dL 1,204    IGA      84 - 499 mg/dL 183    IGM      35 - 242 mg/dL 49    Creatinine      0.67 - 1.17 mg/dL 0.89    GFR Estimate      >60 mL/min/1.73m2 >90    Neutrophil Cytoplasmic Antibody      <1:10  1:10 (H)    Neutrophil Cytoplasmic Antibody Pattern Cytoplasmic ANCA (c-ANCA) staining pattern observed and confirmed on formalin-fixed neutrophils.    AST      10 - 50 U/L 41    ALT      10 - 50 U/L 47    Albumin      3.5 - 5.2 g/dL 4.3    CRP Inflammation      <5.00 mg/L 4.24    Sed Rate      0 - 15 mm/hr 18 (H)      Component      Latest Ref [facility] 6/6/2023  12:00 PM   WBC      4.0 - 11.0 10e3/uL    RBC Count       4.40 - 5.90 10e6/uL    Hemoglobin      13.3 - 17.7 g/dL    Hematocrit      40.0 - 53.0 %    MCV      78 - 100 fL    MCH      26.5 - 33.0 pg    MCHC      31.5 - 36.5 g/dL    RDW      10.0 - 15.0 %    Platelet Count      150 - 450 10e3/uL    % Neutrophils      %    % Lymphocytes      %    % Monocytes      %    % Eosinophils      %    % Basophils      %    % Immature Granulocytes      %    NRBCs per 100 WBC      <1 /100    Absolute Neutrophils      1.6 - 8.3 10e3/uL    Absolute Lymphocytes      0.8 - 5.3 10e3/uL    Absolute Monocytes      0.0 - 1.3 10e3/uL    Absolute Eosinophils      0.0 - 0.7 10e3/uL    Absolute Basophils      0.0 - 0.2 10e3/uL    Absolute Immature Granulocytes      <=0.4 10e3/uL    Absolute NRBCs      10e3/uL    Color Urine      Colorless, Straw, Light Yellow, Yellow  Straw    Appearance Urine      Clear  Clear    Glucose Urine      Negative mg/dL Negative    Bilirubin Urine      Negative  Negative    Ketones Urine      Negative mg/dL Negative    Specific Gravity Urine      1.003 - 1.035  1.009    Blood Urine      Negative  Negative    pH Urine      5.0 - 7.0  5.5    Protein Albumin Urine      Negative mg/dL Negative    Urobilinogen mg/dL      Normal, 2.0 mg/dL Normal    Nitrite Urine      Negative  Negative    Leukocyte Esterase Urine      Negative  Negative    RBC Urine      <=2 /HPF <1    WBC Urine      <=5 /HPF <1    MPO Zenaida IgG Instrument Value      <3.5 U/mL    Myeloperoxidase Antibody IgG      Negative     Proteinase 3 Zenaida IgG Instrument Value      <2.0 U/mL    Proteinase 3 Antibody IgG      Negative     Total Protein Urine mg/dL      1.0 - 14.0 mg/dL <6.0    Total Protein UR MG/MG CR --    Creatinine Urine      mg/dL 27.9    CD19 B Cells      6 - 27 %    Absolute CD19      107 - 698 cells/uL    IGG      610 - 1,616 mg/dL    IGA      84 - 499 mg/dL    IGM      35 - 242 mg/dL    Creatinine      0.67 - 1.17 mg/dL    GFR Estimate      >60 mL/min/1.73m2    Neutrophil Cytoplasmic Antibody       <1:10     Neutrophil Cytoplasmic Antibody Pattern    AST      10 - 50 U/L    ALT      10 - 50 U/L    Albumin      3.5 - 5.2 g/dL    CRP Inflammation      <5.00 mg/L    Sed Rate      0 - 15 mm/hr       Legend:  (L) Low  (H) High  ! Abnormal      sIabella Manley MD

## 2023-08-10 ENCOUNTER — TELEPHONE (OUTPATIENT)
Dept: RHEUMATOLOGY | Facility: CLINIC | Age: 43
End: 2023-08-10
Payer: COMMERCIAL

## 2023-08-10 NOTE — TELEPHONE ENCOUNTER
HEATHER and darrell sent for the patient to call back and schedule the following:    Appointment type: Return Video  Provider: Dr. Manley  Return date: January 2024  Specialty phone number: [phone]  Additional appointment(s) needed: Labs (just ANCA, MPO) in 9/2023 and every 3 months   Additonal Notes:

## 2023-08-15 ENCOUNTER — TELEPHONE (OUTPATIENT)
Dept: RHEUMATOLOGY | Facility: CLINIC | Age: 43
End: 2023-08-15
Payer: COMMERCIAL

## 2023-08-15 NOTE — TELEPHONE ENCOUNTER
LVM x2 for the patient to call back and schedule the following:    Appointment type: Return Video  Provider: Dr. Manley  Return date: January 2024  Specialty phone number: [phone]  Additional appointment(s) needed:   Additonal Notes:

## 2023-09-08 ENCOUNTER — TRANSFERRED RECORDS (OUTPATIENT)
Dept: HEALTH INFORMATION MANAGEMENT | Facility: CLINIC | Age: 43
End: 2023-09-08

## 2023-10-11 ENCOUNTER — LAB (OUTPATIENT)
Dept: LAB | Facility: CLINIC | Age: 43
End: 2023-10-11
Payer: COMMERCIAL

## 2023-10-11 DIAGNOSIS — M30.1 EOSINOPHILIC GRANULOMATOSIS WITH POLYANGIITIS (EGPA) (H): ICD-10-CM

## 2023-10-11 DIAGNOSIS — Z51.81 MEDICATION MONITORING ENCOUNTER: ICD-10-CM

## 2023-10-11 DIAGNOSIS — D72.18 EOSINOPHILIC GRANULOMATOSIS WITH POLYANGIITIS (EGPA) (H): ICD-10-CM

## 2023-10-11 PROCEDURE — 86037 ANCA TITER EACH ANTIBODY: CPT | Performed by: INTERNAL MEDICINE

## 2023-10-11 PROCEDURE — 83876 ASSAY MYELOPEROXIDASE: CPT | Performed by: INTERNAL MEDICINE

## 2023-10-11 PROCEDURE — 36415 COLL VENOUS BLD VENIPUNCTURE: CPT | Performed by: PATHOLOGY

## 2023-10-11 PROCEDURE — 99000 SPECIMEN HANDLING OFFICE-LAB: CPT | Performed by: PATHOLOGY

## 2023-10-13 LAB
ANCA AB PATTERN SER IF-IMP: NORMAL
C-ANCA TITR SER IF: NORMAL {TITER}
MYELOPEROXIDASE AB SER IA-ACNC: 12 U/ML
MYELOPEROXIDASE AB SER IA-ACNC: POSITIVE
PROTEINASE3 AB SER IA-ACNC: <1 U/ML
PROTEINASE3 AB SER IA-ACNC: NEGATIVE

## 2024-01-21 ENCOUNTER — HEALTH MAINTENANCE LETTER (OUTPATIENT)
Age: 44
End: 2024-01-21

## 2024-02-15 ENCOUNTER — TELEPHONE (OUTPATIENT)
Dept: RHEUMATOLOGY | Facility: CLINIC | Age: 44
End: 2024-02-15
Payer: COMMERCIAL

## 2024-02-15 ENCOUNTER — VIRTUAL VISIT (OUTPATIENT)
Dept: RHEUMATOLOGY | Facility: CLINIC | Age: 44
End: 2024-02-15
Attending: INTERNAL MEDICINE
Payer: COMMERCIAL

## 2024-02-15 DIAGNOSIS — D72.18 EOSINOPHILIC GRANULOMATOSIS WITH POLYANGIITIS (EGPA) (H): Primary | ICD-10-CM

## 2024-02-15 DIAGNOSIS — M30.1 EOSINOPHILIC GRANULOMATOSIS WITH POLYANGIITIS (EGPA) (H): Primary | ICD-10-CM

## 2024-02-15 DIAGNOSIS — Z51.81 MEDICATION MONITORING ENCOUNTER: ICD-10-CM

## 2024-02-15 PROCEDURE — 99213 OFFICE O/P EST LOW 20 MIN: CPT | Mod: 95 | Performed by: INTERNAL MEDICINE

## 2024-02-15 ASSESSMENT — PAIN SCALES - GENERAL: PAINLEVEL: NO PAIN (0)

## 2024-02-15 NOTE — NURSING NOTE
Unable to complete PHQ-2 due to time restraint.     Is the patient currently in the state of [place]? YES    Visit mode:VIDEO    If the visit is dropped, the patient can be reconnected by: VIDEO VISIT: Text to cell phone:   Telephone Information:   Mobile [phone]       Will anyone else be joining the visit? NO  (If patient encounters technical issues they should call [phone]    How would you like to obtain your AVS? MyChart    Are changes needed to the allergy or medication list? No    Reason for visit: RECHECK    Medications and allergies have been reviewed.      Solitario JOLLYF

## 2024-02-15 NOTE — PROGRESS NOTES
Virtual Visit Details    Type of service:  Video Visit     Joined the call at 2/15/2024, 3:39:11 pm.  Left the call at 2/15/2024, 3:48:43 pm.    Originating Location (pt. 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 He has previously failed methotrexate, azathioprine, Nucala and, most recently, MMF due to recurrent sinusitis and respiratory symptoms.     11/2022: Specifically, methotrexate 8 tabs weekly was associated with recurrent URI with no improvement in symptoms on 6 tabs weekly. He also then transitioned to AZA in 8/2019 (NL TPMT) but was put on hold in 11/2019 given recurrent sinusitis and antibiotic tx, ultimately requiring sinus surgery/polypectomy on 12/10/2019. He retried AZA 50 mg bid on 3/2020 but had a transient elevated LFTs and improvement of MPO even before resuming AZA. He subsequently trialed Nucala on 2/2022 but discontinued on 9/2022 due to persistent sinusitis and symptoms flares. 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    At this time, the patient is still completing his antibiotic course for Moraxella sinusitis and would prefer to hold off on further immunosuppression. He does feel that his symptoms are controlled at present on antibiotics and Medrol dose pack. We discussed risks and benefits of not restarting immunosuppression, and he was agreeable to considering a retrial of MMF pending further discussion at  follow-up in 3 months. Exam today was otherwise unremarkable, and labs were notable for unremarkable inflammatory markers and ANCA.     3/22/2023: Resumed  mg bid recently, was on it for about 3 wk, but started having sinus sx 2 wk ago, stopped MMF (cellcept), took medrol dose pack, no antibiotic needed this time. Stable labs from 11/2022 with neg ANCA and normal ESR/CRP, no asthma flares and reportedly almost no recurrence of nasal polyps. Feeling well today. Will check labs this month and monitor off MMF for flares. If he continues to flare up with sinus sx, will consider a trial of rituximab. Risks were discussed.       7/19/2023: stable with stable labs (except slight elevation of vasculitis markers), will monitor off tx. Recent labs were reviewed and discussed.    Today's Plan:    Continue follow-up with ENT    Labs (just ANCA, MPO) in 9/2023 and every 3 months      Return in 6 months (video), could be re-scheduled if no complaints    Today 2/15/2024:    Stable ANCA vasculitis off Tx, stable labs. Will continue to monitor sx off tx.    Plan:    Labs at [facility], please fax orders    Return video visit in 6 months    Orders Placed This Encounter   Procedures     AST     ALT     Myeloperoxidase and Proteinase 3 Panel     Albumin level     Creatinine     CRP inflammation     UA with Microscopic reflex to Culture     Protein  random urine     Creatinine random urine     Erythrocyte sedimentation rate auto     ANCA IgG by IFA with Reflex to Titer     CBC with Platelets & Differential       Isabella Manley MD    HPI:    8/20/2021: On AZA 50 mg bid since end of 3/2020. Tolerates it well. Reports no recurrence of nasal polyps. He will do follow up with ENT. Labs from 2/2021 are stable. His EGPA seems to be stable. Reports seasonal allergies at this time of the year, using inhalor. Nasal congestion got better by missing AZA x 3 days.    We discussed covid booster vaccine, no official recommendation for J&J  receivers yet but most likely will need a booster being immunocompromised.    2/4/2022: Flaring with EGPA, sinus inflammation, increased vasculitis markers. On and off AZA due to sinus infections. Recommend to switch to nucala to control EGPA; risks were discussed. Will treat with prednisone taper.    8/18/2022: Unclear if sinus sx are due to vasculitis and if he fails nucala or not, will get ENT opinion and check labs.    9/23/2022: Labs are stable, but clinically he continues to flare with sinusitis, nasal polyps, steroid responsive. Since he failed nucala, recommend to stop. Discussed next tx options cellcept versus rituximab. Agreed to try cellcept as safer option next; risks were discussed.    11/30/2022:    Patient reports that he discontinued MMF 2 weeks ago due to worsening congestion, post nasal drip, cough and shortness of breath with subsequent ENT evaluation revealing nasal culture positive sinus infection on 11/11/22. He was started on an unspecified antibiotic on 11/16 and began PRN Medrol dose pack provided by Rheumatology staff with immediate improvement in symptoms. He now continues to have aforementioned symptoms but reduced in severity.    No other acute complaints when seen today. Patient overall reports feeling that symptoms are controlled on antibiotics and steroids and would prefer to wait on restarting MMF or other immunosuppressive regimen at this time.       3/22/2023:     Tried MMF again for about 3wk [place], got recurrence of sinus sx, tried medrol dose pack and stopped cellcept, this was 2 wk ago, medrol helped.    Asthma is well controlled on Symbicort inh only, has not required rescue inhaler.     No new sx of rash, neuropathy, joint pain.    Feeling well today.    7/19/2023:    -Sometimes sinuses feels plugged on especially with moaning the lawn, uses Symbicort once-twice a day, uses rescue inhaler less often.    -No more steroid use since last  visit    -No recent infection needing antibiotic    Today 2/15/2024:      Was feeling stuffy due to allergies around Thanksgiving; otherwise no flares of vasculitis, no nasal polyps, no infections      History of Presenting Illness    2/4/2022: Had 2 sinusitis, went on 2 rounds of antibiotics. Had 3 different bacteria on the culture. Off antibiotic x 2 weeks, resumed AZA x past 2 weeks. Still gets PND and coughs a lot. ENT saw inflammation on exam with no infection, this was about 2.5 wk ago when he was recommended to resume AZA. Was off AZA from 9/2021 till two weeks ago.  1st and 2nd round of antibiotic, did not come off AZA, just with 3rd round. Has some SOB, uses inhalers more. Gets winded more with exercise.     8/19/2022: His nose gets stuffy on lying down. Saw ENT and was given prednisone and it helped some, but cough and congestion is coming back off prednisone. Took last prednisone last week. It was 40 mg every day max. Seasonal allergies are triggers. Sometimes it is harder to breathe when he is working out.     9/23/2022: Lucio presents for follow up, continues to have flares of sinusitis with nasal polyps despite being on nucala, which are steroid responsive.    Mj Isabella Brown. 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    He is doing fairly well. No recurrence of nasal polyps on imuran. Last seen by ENT in 12/2020, has upcoming appointment. Reports having seasonal allergies at this time of the year, has been using his inhalers x past 2 weeks. Reports some nasal congestion, it was better when he ran out of AZA x 3 days early this week.    He sleeps at the side, has block nostril, was better on medrol dose pack.    Medrol dose pack finished yesterday. Last antibiotic was a month ago. Medrol works better, prednisone does not help.    Uses inhaler to prevent asthma.    No joint pain or skin rash.    Had surgery for R shoulder rotator cuff full tear which was successful.    Had  J&J covid vaccine on 4/8/2021, tolerated it well.    Family History   Problem Relation Age of Onset     Colon Cancer Maternal Grandmother      Stomach Cancer Paternal Grandmother      No Known Problems Mother      No Known Problems Father      No Known Problems Maternal Grandfather      No Known Problems Paternal Grandfather      No Known Problems Brother      No Known Problems Sister      No Known Problems Son      No Known Problems Daughter      No Known Problems Maternal Half-Brother      No Known Problems Maternal Half-Sister      No Known Problems Paternal Half-Brother      No Known Problems Paternal Half-Sister      No Known Problems Niece      No Known Problems Nephew      No Known Problems Cousin      No Known Problems Other      Cancer No family hx of      Diabetes No family hx of      Heart Disease No family hx of      Hypertension No family hx of      Cerebrovascular Disease No family hx of      Asthma No family hx of      Thyroid Disease No family hx of      Depression No family hx of      Mental Illness No family hx of      Substance Abuse No family hx of      Dementia No family hx of      Bleeding Disorder No family hx of      Congenital Anomalies No family hx of      Migraines No family hx of      Stomach Problem No family hx of      Muscular Disorder No family hx of      Osteoporosis No family hx of      Unknown/Adopted No family hx of      Social History     Socioeconomic History     Marital status:      Spouse name: Not on file     Number of children: 2     Years of education: Not on file     Highest education level: Not on file   Occupational History     Occupation:  manager   Tobacco Use     Smoking status: Never     Passive exposure: Past     Smokeless tobacco: Never   Substance and Sexual Activity     Alcohol use: Yes     Drug use: Never     Sexual activity: Not on file   Other Topics Concern     Not on file   Social History Narrative     Not on file     Social Determinants of  Health     Financial Resource Strain: Not on file   Food Insecurity: Not on file   Transportation Needs: Not on file   Physical Activity: Not on file   Stress: Not on file   Social Connections: Not on file   Interpersonal Safety: Not on file   Housing Stability: Not on file       No Known Allergies    Outpatient Encounter Medications as of 2/15/2024   Medication Sig Dispense Refill     albuterol (PROAIR HFA/PROVENTIL HFA/VENTOLIN HFA) 108 (90 Base) MCG/ACT inhaler as needed        budesonide (PULMICORT) 0.5 MG/2ML neb solution        budesonide (PULMICORT) 1 MG/2ML neb solution Take 1 mg by nebulization daily        clindamycin phosphate (EVOCLIN) 1 % external foam as needed       FLOVENT  MCG/ACT inhaler Inhale 1 puff into the lungs daily       montelukast (SINGULAIR) 10 MG tablet Take 10 mg by mouth At Bedtime        SYMBICORT 160-4.5 MCG/ACT Inhaler Inhale 2 puffs into the lungs 2 times daily        cetirizine (ZYRTEC) 10 MG tablet Take 10 mg by mouth daily  (Patient not taking: Reported on 9/23/2022)       diclofenac (VOLTAREN) 1 % topical gel Apply 2 g topically 4 times daily as needed for moderate pain To use over R shoulder (Patient not taking: Reported on 9/23/2022) 100 g 1     levocetirizine (XYZAL) 5 MG tablet Take 5 mg by mouth every morning (Patient not taking: Reported on 3/22/2023)       methylPREDNISolone (MEDROL DOSEPAK) 4 MG tablet therapy pack Take per package instructions. As needed for flare up (Patient not taking: Reported on 3/22/2023) 21 tablet 3     tretinoin (RETIN-A) 0.1 % external cream Apply topically as needed  (Patient not taking: Reported on 9/23/2022)       No facility-administered encounter medications on file as of 2/15/2024.       ROS:  A comprehensive ROS was done, positives are per HPI.    Ph.E:    Constitutional: WD/WN. Pleasant. In no acute distress.  HEENT: EOM intact, sclera anicteric, conj not injected. No saddle nose deformity  MS: No synovitis  Skin: No skin  rash  Neuro: A&O x 3  Psych: NL affect     His records were reviewed.    4/2019: pos MPO, p-ANCA    7/2019: NL ESR/CRP    Component      Latest Ref Rng & Units 3/25/2020   WBC      4.0 - 11.0 10e9/L 3.8 (L)   RBC Count      4.4 - 5.9 10e12/L 4.53   Hemoglobin      13.3 - 17.7 g/dL 15.1   Hematocrit      40.0 - 53.0 % 45.3   MCV      78 - 100 fl 100   MCH      26.5 - 33.0 pg 33.3 (H)   MCHC      31.5 - 36.5 g/dL 33.3   RDW      10.0 - 15.0 % 13.0   Platelet Count      150 - 450 10e9/L 235   Diff Method       Automated Method   % Neutrophils      % 43.3   % Lymphocytes      % 41.0   % Monocytes      % 9.0   % Eosinophils      % 4.8   % Basophils      % 1.6   % Immature Granulocytes      % 0.3   Nucleated RBCs      0 /100 0   Absolute Neutrophil      1.6 - 8.3 10e9/L 1.6   Absolute Lymphocytes      0.8 - 5.3 10e9/L 1.5   Absolute Monocytes      0.0 - 1.3 10e9/L 0.3   Absolute Eosinophils      0.0 - 0.7 10e9/L 0.2   Absolute Basophils      0.0 - 0.2 10e9/L 0.1   Abs Immature Granulocytes      0 - 0.4 10e9/L 0.0   Absolute Nucleated RBC       0.0   Sodium      133 - 144 mmol/L 140   Potassium      3.4 - 5.3 mmol/L 3.6   Chloride      94 - 109 mmol/L 106   Carbon Dioxide      20 - 32 mmol/L 32   Anion Gap      3 - 14 mmol/L 2 (L)   Glucose      70 - 99 mg/dL 53 (L)   Urea Nitrogen      7 - 30 mg/dL 22   Creatinine      0.66 - 1.25 mg/dL 0.98   GFR Estimate      >60 mL/min/1.73:m2 >90   GFR Estimate If Black      >60 mL/min/1.73:m2 >90   Calcium      8.5 - 10.1 mg/dL 9.1   Bilirubin Total      0.2 - 1.3 mg/dL 0.6   Albumin      3.4 - 5.0 g/dL 4.4   Protein Total      6.8 - 8.8 g/dL 7.5   Alkaline Phosphatase      40 - 150 U/L 52   ALT      0 - 70 U/L 45   AST      0 - 45 U/L 28   Color Urine       Yellow   Appearance Urine       Clear   Glucose Urine      NEG:Negative mg/dL Negative   Bilirubin Urine      NEG:Negative Negative   Ketones Urine      NEG:Negative mg/dL Negative   Specific Gravity Urine      1.003 - 1.035  1.006   Blood Urine      NEG:Negative Negative   pH Urine      5.0 - 7.0 pH 7.0   Protein Albumin Urine      NEG:Negative mg/dL Negative   Urobilinogen mg/dL      0.0 - 2.0 mg/dL 0.0   Nitrite Urine      NEG:Negative Negative   Leukocyte Esterase Urine      NEG:Negative Negative   Source       Midstream Urine   WBC Urine      0 - 5 /HPF 0   RBC Urine      0 - 2 /HPF 0   Squamous Epithelial /HPF Urine      0 - 1 /HPF <1   Neutrophil Cytoplasmic Antibody      <1:10 titer <1:10   Neutrophil Cytoplasmic Antibody Pattern       The ANCA IFA is <1:10.  No further testing will be performed.   Protein Random Urine      g/L <0.05   Protein Total Urine g/gr Creatinine      0 - 0.2 g/g Cr Unable to calculate due to low value   Myeloperoxidase Antibody IgG      0.0 - 0.9 AI 4.7 (H)   Proteinase 3 Antibody IgG      0.0 - 0.9 AI <0.2   IGE      0 - 114 KIU/L 22   CRP Inflammation      0.0 - 8.0 mg/L <2.9   Sed Rate      0 - 15 mm/h 5   Creatinine Urine      mg/dL 24     Stable labs in 2/2021    Component      Latest Ref Rng & Units 11/16/2021   Color Urine      Colorless, Straw, Light Yellow, Yellow Yellow   Appearance Urine      Clear Clear   Glucose Urine      Negative mg/dL Negative   Bilirubin Urine      Negative Negative   Ketones Urine      Negative mg/dL Negative   Specific Gravity Urine      1.003 - 1.035 1.009   Blood Urine      Negative Negative   pH Urine      5.0 - 7.0 6.0   Protein Albumin Urine      Negative mg/dL Negative   Urobilinogen mg/dL      Normal, 2.0 mg/dL Normal   Nitrite Urine      Negative Negative   Leukocyte Esterase Urine      Negative Negative   RBC Urine      <=2 /HPF <1   WBC Urine      <=5 /HPF 0   MPO Zenaida IgG Instrument Value      <3.5 U/mL 17.0 (H)   Myeloperoxidase Antibody IgG      Negative Positive (A)   Proteinase 3 Zenaida IgG Instrument Value      <2.0 U/mL <1.0   Proteinase 3 Antibody IgG      Negative Negative   Protein Random Urine      g/L <0.05   Protein Total Urine g/gr Creatinine           Creatinine Urine      mg/dL 37   IGG      610-1,616 mg/dL 1,053   IGA      84 - 499 mg/dL 178   IGM      35 - 242 mg/dL 28 (L)   Creatinine      0.66 - 1.25 mg/dL 0.92   GFR Estimate      >60 mL/min/1.73m2 >90   CD19 B Cells      6 - 27 % 7   Absolute CD19      107 - 698 cells/uL 92 (L)   Neutrophil Cytoplasmic Antibody      <1:10 <1:10   Neutrophil Cytoplasmic Antibody Pattern       The ANCA IFA is <1:10.  No further testing will be performed.   AST      0 - 45 U/L 34   ALT      0 - 70 U/L 46   Albumin      3.4 - 5.0 g/dL 3.8   CRP Inflammation      0.0 - 8.0 mg/L <2.9   Sed Rate      0 - 15 mm/hr 10     Component      Latest Ref Rng & Units 2/1/2022   Color Urine      Colorless, Straw, Light Yellow, Yellow Yellow   Appearance Urine      Clear Clear   Glucose Urine      Negative mg/dL Negative   Bilirubin Urine      Negative Negative   Ketones Urine      Negative mg/dL Negative   Specific Gravity Urine      1.003 - 1.035 1.004   Blood Urine      Negative Negative   pH Urine      5.0 - 7.0 6.0   Protein Albumin Urine      Negative mg/dL Negative   Urobilinogen mg/dL      Normal, 2.0 mg/dL Normal   Nitrite Urine      Negative Negative   Leukocyte Esterase Urine      Negative Negative   RBC Urine      <=2 /HPF <1   WBC Urine      <=5 /HPF <1   MPO Zenaida IgG Instrument Value      <3.5 U/mL    Myeloperoxidase Antibody IgG      Negative    Proteinase 3 Zenaida IgG Instrument Value      <2.0 U/mL    Proteinase 3 Antibody IgG      Negative    Protein Random Urine      g/L <0.05   Protein Total Urine g/gr Creatinine          Creatinine Urine      mg/dL 24   IGG      610-1,616 mg/dL 1,146   IGA      84 - 499 mg/dL 168   IGM      35 - 242 mg/dL 30 (L)   Creatinine      0.66 - 1.25 mg/dL 1.06   GFR Estimate      >60 mL/min/1.73m2 89   CD19 B Cells      6 - 27 % 7   Absolute CD19      107 - 698 cells/uL 116   Neutrophil Cytoplasmic Antibody      <1:10 1:160 (H)   Neutrophil Cytoplasmic Antibody Pattern       Perinuclear ANCA  (p-ANCA) staining pattern observed and confirmed on formalin-fixed neutrophils.   AST      0 - 45 U/L 38   ALT      0 - 70 U/L 40   Albumin      3.4 - 5.0 g/dL 3.7   CRP Inflammation      0.0 - 8.0 mg/L 7.4   Sed Rate      0 - 15 mm/hr 13     Component      Latest Ref Rng & Units 2/1/2022   WBC      4.0 - 11.0 10e3/uL 5.5   RBC Count      4.40 - 5.90 10e6/uL 4.39 (L)   Hemoglobin      13.3 - 17.7 g/dL 14.9   Hematocrit      40.0 - 53.0 % 42.8   MCV      78 - 100 fL 98   MCH      26.5 - 33.0 pg 33.9 (H)   MCHC      31.5 - 36.5 g/dL 34.8   RDW      10.0 - 15.0 % 12.2   Platelet Count      150 - 450 10e3/uL 257   % Neutrophils      % 54   % Lymphocytes      % 25   % Monocytes      % 10   % Eosinophils      % 9   % Basophils      % 2   % Immature Granulocytes      % 0   NRBCs per 100 WBC      <1 /100 0   Absolute Neutrophils      1.6 - 8.3 10e3/uL 3.0   Absolute Lymphocytes      0.8 - 5.3 10e3/uL 1.3   Absolute Monocytes      0.0 - 1.3 10e3/uL 0.6   Absolute Eosinophils      0.0 - 0.7 10e3/uL 0.5   Absolute Basophils      0.0 - 0.2 10e3/uL 0.1   Absolute Immature Granulocytes      <=0.4 10e3/uL 0.0   Absolute NRBCs      10e3/uL 0.0   Color Urine      Colorless, Straw, Light Yellow, Yellow Yellow   Appearance Urine      Clear Clear   Glucose Urine      Negative mg/dL Negative   Bilirubin Urine      Negative Negative   Ketones Urine      Negative mg/dL Negative   Specific Gravity Urine      1.003 - 1.035 1.004   Blood Urine      Negative Negative   pH Urine      5.0 - 7.0 6.0   Protein Albumin Urine      Negative mg/dL Negative   Urobilinogen mg/dL      Normal, 2.0 mg/dL Normal   Nitrite Urine      Negative Negative   Leukocyte Esterase Urine      Negative Negative   RBC Urine      <=2 /HPF <1   WBC Urine      <=5 /HPF <1   MPO Zenaida IgG Instrument Value      <3.5 U/mL 102.0 (H)   Myeloperoxidase Antibody IgG      Negative Positive (A)   Proteinase 3 Zenaida IgG Instrument Value      <2.0 U/mL <1.0   Proteinase 3 Antibody IgG       Negative Negative   IGG      610-1,616 mg/dL 1,146   IGA      84 - 499 mg/dL 168   IGM      35 - 242 mg/dL 30 (L)   Protein Random Urine      g/L <0.05   Protein Total Urine g/gr Creatinine          Creatinine Urine      mg/dL 24   Neutrophil Cytoplasmic Antibody      <1:10 1:160 (H)   Neutrophil Cytoplasmic Antibody Pattern       Perinuclear ANCA (p-ANCA) staining pattern observed and confirmed on formalin-fixed neutrophils.   CD19 B Cells      6 - 27 % 7   Absolute CD19      107 - 698 cells/uL 116   Creatinine      0.66 - 1.25 mg/dL 1.06   GFR Estimate      >60 mL/min/1.73m2 89   Sed Rate      0 - 15 mm/hr 13   CRP Inflammation      0.0 - 8.0 mg/L 7.4   Albumin      3.4 - 5.0 g/dL 3.7   ALT      0 - 70 U/L 40   AST      0 - 45 U/L 38     Component      Latest Ref Rng & Units 9/12/2022   WBC      4.0 - 11.0 10e3/uL 5.0   RBC Count      4.40 - 5.90 10e6/uL 4.50   Hemoglobin      13.3 - 17.7 g/dL 15.0   Hematocrit      40.0 - 53.0 % 43.5   MCV      78 - 100 fL 97   MCH      26.5 - 33.0 pg 33.3 (H)   MCHC      31.5 - 36.5 g/dL 34.5   RDW      10.0 - 15.0 % 12.5   Platelet Count      150 - 450 10e3/uL 221   % Neutrophils      % 49   % Lymphocytes      % 36   % Monocytes      % 12   % Eosinophils      % 1   % Basophils      % 1   % Immature Granulocytes      % 1   NRBCs per 100 WBC      <1 /100 0   Absolute Neutrophils      1.6 - 8.3 10e3/uL 2.4   Absolute Lymphocytes      0.8 - 5.3 10e3/uL 1.8   Absolute Monocytes      0.0 - 1.3 10e3/uL 0.6   Absolute Eosinophils      0.0 - 0.7 10e3/uL 0.0   Absolute Basophils      0.0 - 0.2 10e3/uL 0.1   Absolute Immature Granulocytes      <=0.4 10e3/uL 0.0   Absolute NRBCs      10e3/uL 0.0   Color Urine      Colorless, Straw, Light Yellow, Yellow Yellow   Appearance Urine      Clear Clear   Glucose Urine      Negative mg/dL Negative   Bilirubin Urine      Negative Negative   Ketones Urine      Negative mg/dL Negative   Specific Gravity Urine      1.003 - 1.035 1.005   Blood  Urine      Negative Negative   pH Urine      5.0 - 7.0 5.0   Protein Albumin Urine      Negative mg/dL Negative   Urobilinogen mg/dL      Normal, 2.0 mg/dL Normal   Nitrite Urine      Negative Negative   Leukocyte Esterase Urine      Negative Negative   RBC Urine      <=2 /HPF 0   WBC Urine      <=5 /HPF 0   MPO Zenaida IgG Instrument Value      <3.5 U/mL 8.0 (H)   Myeloperoxidase Antibody IgG      Negative Positive (A)   Proteinase 3 Zenaida IgG Instrument Value      <2.0 U/mL <1.0   Proteinase 3 Antibody IgG      Negative Negative   Total Protein Urine mg/dL      mg/dL <6.0   Total Protein UR MG/MG CR          Creatinine Urine      mg/dL 20.3   IGG      610 - 1,616 mg/dL 906   IGA      84 - 499 mg/dL 160   IGM      35 - 242 mg/dL 32 (L)   Creatinine      0.67 - 1.17 mg/dL 1.06   GFR Estimate      >60 mL/min/1.73m2 89   CD19 B Cells      6 - 27 % 8   Absolute CD19      107 - 698 cells/uL 151   Neutrophil Cytoplasmic Antibody      <1:10 <1:10   Neutrophil Cytoplasmic Antibody Pattern       The ANCA IFA is <1:10.  No further testing will be performed.   AST      10 - 50 U/L 36   ALT      10 - 50 U/L 35   Albumin      3.5 - 5.2 g/dL 4.5   CRP Inflammation      <5.00 mg/L <3.00   Sed Rate      0 - 15 mm/hr 6     Component      Latest Ref Rng & Units 11/1/2022   WBC      4.0 - 11.0 10e3/uL 6.4   RBC Count      4.40 - 5.90 10e6/uL 4.44   Hemoglobin      13.3 - 17.7 g/dL 14.8   Hematocrit      40.0 - 53.0 % 42.7   MCV      78 - 100 fL 96   MCH      26.5 - 33.0 pg 33.3 (H)   MCHC      31.5 - 36.5 g/dL 34.7   RDW      10.0 - 15.0 % 12.5   Platelet Count      150 - 450 10e3/uL 247   % Neutrophils      % 62   % Lymphocytes      % 26   % Monocytes      % 9   % Eosinophils      % 2   % Basophils      % 1   % Immature Granulocytes      % 0   NRBCs per 100 WBC      <1 /100 0   Absolute Neutrophils      1.6 - 8.3 10e3/uL 4.0   Absolute Lymphocytes      0.8 - 5.3 10e3/uL 1.7   Absolute Monocytes      0.0 - 1.3 10e3/uL 0.6   Absolute  Eosinophils      0.0 - 0.7 10e3/uL 0.1   Absolute Basophils      0.0 - 0.2 10e3/uL 0.1   Absolute Immature Granulocytes      <=0.4 10e3/uL 0.0   Absolute NRBCs      10e3/uL 0.0   Color Urine      Colorless, Straw, Light Yellow, Yellow Straw   Appearance Urine      Clear Clear   Glucose Urine      Negative mg/dL Negative   Bilirubin Urine      Negative Negative   Ketones Urine      Negative mg/dL Negative   Specific Gravity Urine      1.003 - 1.035 1.010   Blood Urine      Negative Negative   pH Urine      5.0 - 7.0 6.0   Protein Albumin Urine      Negative mg/dL Negative   Urobilinogen mg/dL      Normal, 2.0 mg/dL Normal   Nitrite Urine      Negative Negative   Leukocyte Esterase Urine      Negative Negative   RBC Urine      <=2 /HPF 0   WBC Urine      <=5 /HPF <1   MPO Zenaida IgG Instrument Value      <3.5 U/mL 1.5   Myeloperoxidase Antibody IgG      Negative Negative   Proteinase 3 Zenaida IgG Instrument Value      <2.0 U/mL <1.0   Proteinase 3 Antibody IgG      Negative Negative   Total Protein Urine mg/dL      mg/dL <6.0   Total Protein UR MG/MG CR          Creatinine Urine      mg/dL 36.7   IGG      610 - 1,616 mg/dL 948   IGA      84 - 499 mg/dL 154   IGM      35 - 242 mg/dL 27 (L)   Creatinine      0.67 - 1.17 mg/dL 0.97   GFR Estimate      >60 mL/min/1.73m2 >90   CD19 B Cells      6 - 27 % 13   Absolute CD19      107 - 698 cells/uL 240   Neutrophil Cytoplasmic Antibody      <1:10 <1:10   Neutrophil Cytoplasmic Antibody Pattern       The ANCA IFA is <1:10.  No further testing will be performed.   AST      10 - 50 U/L 44   ALT      10 - 50 U/L 37   Albumin      3.5 - 5.2 g/dL 4.6   CRP Inflammation      <5.00 mg/L <3.00   Sed Rate      0 - 15 mm/hr 6     Component      Latest [facility] 6/6/2023  11:57 AM   WBC      4.0 - 11.0 10e3/uL 6.3    RBC Count      4.40 - 5.90 10e6/uL 4.22 (L)    Hemoglobin      13.3 - 17.7 g/dL 14.0    Hematocrit      40.0 - 53.0 % 40.6    MCV      78 - 100 fL 96    MCH      26.5 - 33.0 pg 33.2  (H)    MCHC      31.5 - 36.5 g/dL 34.5    RDW      10.0 - 15.0 % 12.3    Platelet Count      150 - 450 10e3/uL 325    % Neutrophils      % 58    % Lymphocytes      % 26    % Monocytes      % 8    % Eosinophils      % 6    % Basophils      % 2    % Immature Granulocytes      % 0    NRBCs per 100 WBC      <1 /100 0    Absolute Neutrophils      1.6 - 8.3 10e3/uL 3.7    Absolute Lymphocytes      0.8 - 5.3 10e3/uL 1.7    Absolute Monocytes      0.0 - 1.3 10e3/uL 0.5    Absolute Eosinophils      0.0 - 0.7 10e3/uL 0.4    Absolute Basophils      0.0 - 0.2 10e3/uL 0.1    Absolute Immature Granulocytes      <=0.4 10e3/uL 0.0    Absolute NRBCs      10e3/uL 0.0    Color Urine      Colorless, Straw, Light Yellow, Yellow     Appearance Urine      Clear     Glucose Urine      Negative mg/dL    Bilirubin Urine      Negative     Ketones Urine      Negative mg/dL    Specific Gravity Urine      1.003 - 1.035     Blood Urine      Negative     pH Urine      5.0 - 7.0     Protein Albumin Urine      Negative mg/dL    Urobilinogen mg/dL      Normal, 2.0 mg/dL    Nitrite Urine      Negative     Leukocyte Esterase Urine      Negative     RBC Urine      <=2 /HPF    WBC Urine      <=5 /HPF    MPO Zenaida IgG Instrument Value      <3.5 U/mL 12.0 (H)    Myeloperoxidase Antibody IgG      Negative  Positive !    Proteinase 3 Zenaida IgG Instrument Value      <2.0 U/mL <1.0    Proteinase 3 Antibody IgG      Negative  Negative    Total Protein Urine mg/dL      1.0 - 14.0 mg/dL    Total Protein UR MG/MG CR    Creatinine Urine      mg/dL    CD19 B Cells      6 - 27 % 11    Absolute CD19      107 - 698 cells/uL 182    IGG      610 - 1,616 mg/dL 1,204    IGA      84 - 499 mg/dL 183    IGM      35 - 242 mg/dL 49    Creatinine      0.67 - 1.17 mg/dL 0.89    GFR Estimate      >60 mL/min/1.73m2 >90    Neutrophil Cytoplasmic Antibody      <1:10  1:10 (H)    Neutrophil Cytoplasmic Antibody Pattern Cytoplasmic ANCA (c-ANCA) staining pattern observed and confirmed on  formalin-fixed neutrophils.    AST      10 - 50 U/L 41    ALT      10 - 50 U/L 47    Albumin      3.5 - 5.2 g/dL 4.3    CRP Inflammation      <5.00 mg/L 4.24    Sed Rate      0 - 15 mm/hr 18 (H)      Component      Latest Ref Rn 6/6/2023  12:00 PM   WBC      4.0 - 11.0 10e3/uL    RBC Count      4.40 - 5.90 10e6/uL    Hemoglobin      13.3 - 17.7 g/dL    Hematocrit      40.0 - 53.0 %    MCV      78 - 100 fL    MCH      26.5 - 33.0 pg    MCHC      31.5 - 36.5 g/dL    RDW      10.0 - 15.0 %    Platelet Count      150 - 450 10e3/uL    % Neutrophils      %    % Lymphocytes      %    % Monocytes      %    % Eosinophils      %    % Basophils      %    % Immature Granulocytes      %    NRBCs per 100 WBC      <1 /100    Absolute Neutrophils      1.6 - 8.3 10e3/uL    Absolute Lymphocytes      0.8 - 5.3 10e3/uL    Absolute Monocytes      0.0 - 1.3 10e3/uL    Absolute Eosinophils      0.0 - 0.7 10e3/uL    Absolute Basophils      0.0 - 0.2 10e3/uL    Absolute Immature Granulocytes      <=0.4 10e3/uL    Absolute NRBCs      10e3/uL    Color Urine      Colorless, Straw, Light Yellow, Yellow  Straw    Appearance Urine      Clear  Clear    Glucose Urine      Negative mg/dL Negative    Bilirubin Urine      Negative  Negative    Ketones Urine      Negative mg/dL Negative    Specific Gravity Urine      1.003 - 1.035  1.009    Blood Urine      Negative  Negative    pH Urine      5.0 - 7.0  5.5    Protein Albumin Urine      Negative mg/dL Negative    Urobilinogen mg/dL      Normal, 2.0 mg/dL Normal    Nitrite Urine      Negative  Negative    Leukocyte Esterase Urine      Negative  Negative    RBC Urine      <=2 /HPF <1    WBC Urine      <=5 /HPF <1    MPO Zenaida IgG Instrument Value      <3.5 U/mL    Myeloperoxidase Antibody IgG      Negative     Proteinase 3 Zenaida IgG Instrument Value      <2.0 U/mL    Proteinase 3 Antibody IgG      Negative     Total Protein Urine mg/dL      1.0 - 14.0 mg/dL <6.0    Total Protein UR MG/MG CR --    Creatinine  Urine      mg/dL 27.9    CD19 B Cells      6 - 27 %    Absolute CD19      107 - 698 cells/uL    IGG      610 - 1,616 mg/dL    IGA      84 - 499 mg/dL    IGM      35 - 242 mg/dL    Creatinine      0.67 - 1.17 mg/dL    GFR Estimate      >60 mL/min/1.73m2    Neutrophil Cytoplasmic Antibody      <1:10     Neutrophil Cytoplasmic Antibody Pattern    AST      10 - 50 U/L    ALT      10 - 50 U/L    Albumin      3.5 - 5.2 g/dL    CRP Inflammation      <5.00 mg/L    Sed Rate      0 - 15 mm/hr       Legend:  (L) Low  (H) High  ! Abnormal  Component      Latest Ref Rn 10/11/2023  11:32 AM   MPO Zenaida IgG Instrument Value      <3.5 U/mL 12.0 (H)    Myeloperoxidase Antibody IgG      Negative  Positive !    Proteinase 3 Zenaida IgG Instrument Value      <2.0 U/mL <1.0    Proteinase 3 Antibody IgG      Negative  Negative    Neutrophil Cytoplasmic Antibody      <1:10  <1:10    Neutrophil Cytoplasmic Antibody Pattern The ANCA IFA is <1:10. No further testing will be performed.       Legend:  (H) High  ! Abnormal

## 2024-02-15 NOTE — LETTER
2/15/2024       RE: Mj Gallegosmichael Brown.  [address] [place] [place] [address]     Dear Colleague,    Thank you for referring your patient, Mj Mason Jr., to the [facility] RHEUMATOLOGY [facility]IN[facility] [place] at [facility]. Please see a copy of my visit note below.    Virtual Visit Details    Type of service:  Video Visit     Joined the call at 2/15/2024, 3:39:11 pm.  Left the call at 2/15/2024, 3:48:43 pm.    Originating Location (pt. 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 He has previously failed methotrexate, azathioprine, Nucala and, most recently, MMF due to recurrent sinusitis and respiratory symptoms.     11/2022: Specifically, methotrexate 8 tabs weekly was associated with recurrent URI with no improvement in symptoms on 6 tabs weekly. He also then transitioned to AZA in 8/2019 (NL TPMT) but was put on hold in 11/2019 given recurrent sinusitis and antibiotic tx, ultimately requiring sinus surgery/polypectomy on 12/10/2019. He retried AZA 50 mg bid on 3/2020 but had a transient elevated LFTs and improvement of MPO even before resuming AZA. He subsequently trialed Nucala on 2/2022 but discontinued on 9/2022 due to persistent sinusitis and symptoms flares. 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    At this time, the patient is still completing his antibiotic course for Moraxella  sinusitis and would prefer to hold off on further immunosuppression. He does feel that his symptoms are controlled at present on antibiotics and Medrol dose pack. We discussed risks and benefits of not restarting immunosuppression, and he was agreeable to considering a retrial of MMF pending further discussion at follow-up in 3 months. Exam today was otherwise unremarkable, and labs were notable for unremarkable inflammatory markers and ANCA.     3/22/2023: Resumed  mg bid recently, was on it for about 3 wk, but started having sinus sx 2 wk ago, stopped MMF (cellcept), took medrol dose pack, no antibiotic needed this time. Stable labs from 11/2022 with neg ANCA and normal ESR/CRP, no asthma flares and reportedly almost no recurrence of nasal polyps. Feeling well today. Will check labs this month and monitor off MMF for flares. If he continues to flare up with sinus sx, will consider a trial of rituximab. Risks were discussed.       7/19/2023: stable with stable labs (except slight elevation of vasculitis markers), will monitor off tx. Recent labs were reviewed and discussed.    Today's Plan:    Continue follow-up with ENT    Labs (just ANCA, MPO) in 9/2023 and every 3 months      Return in 6 months (video), could be re-scheduled if no complaints    Today 2/15/2024:    Stable ANCA vasculitis off Tx, stable labs. Will continue to monitor sx off tx.    Plan:    Labs at [facility], please fax orders    Return video visit in 6 months    Orders Placed This Encounter   Procedures    AST    ALT    Myeloperoxidase and Proteinase 3 Panel    Albumin level    Creatinine    CRP inflammation    UA with Microscopic reflex to Culture    Protein  random urine    Creatinine random urine    Erythrocyte sedimentation rate auto    ANCA IgG by IFA with Reflex to Titer    CBC with Platelets & Differential       Isabella Manley MD    HPI:    8/20/2021: On AZA 50 mg bid since end of 3/2020. Tolerates it well. Reports no recurrence of  nasal polyps. He will do follow up with ENT. Labs from 2/2021 are stable. His EGPA seems to be stable. Reports seasonal allergies at this time of the year, using inhalor. Nasal congestion got better by missing AZA x 3 days.    We discussed covid booster vaccine, no official recommendation for J&J receivers yet but most likely will need a booster being immunocompromised.    2/4/2022: Flaring with EGPA, sinus inflammation, increased vasculitis markers. On and off AZA due to sinus infections. Recommend to switch to nucala to control EGPA; risks were discussed. Will treat with prednisone taper.    8/18/2022: Unclear if sinus sx are due to vasculitis and if he fails nucala or not, will get ENT opinion and check labs.    9/23/2022: Labs are stable, but clinically he continues to flare with sinusitis, nasal polyps, steroid responsive. Since he failed nucala, recommend to stop. Discussed next tx options cellcept versus rituximab. Agreed to try cellcept as safer option next; risks were discussed.    11/30/2022:    Patient reports that he discontinued MMF 2 weeks ago due to worsening congestion, post nasal drip, cough and shortness of breath with subsequent ENT evaluation revealing nasal culture positive sinus infection on 11/11/22. He was started on an unspecified antibiotic on 11/16 and began PRN Medrol dose pack provided by Rheumatology staff with immediate improvement in symptoms. He now continues to have aforementioned symptoms but reduced in severity.    No other acute complaints when seen today. Patient overall reports feeling that symptoms are controlled on antibiotics and steroids and would prefer to wait on restarting MMF or other immunosuppressive regimen at this time.       3/22/2023:     Tried MMF again for about 3wk [place], got recurrence of sinus sx, tried medrol dose pack and stopped cellcept, this was 2 wk ago, medrol helped.    Asthma is well controlled on Symbicort inh only,  has not required rescue inhaler.     No new sx of rash, neuropathy, joint pain.    Feeling well today.    7/19/2023:    -Sometimes sinuses feels plugged on especially with moaning the lawn, uses Symbicort once-twice a day, uses rescue inhaler less often.    -No more steroid use since last visit    -No recent infection needing antibiotic    Today 2/15/2024:      Was feeling stuffy due to allergies around Thanksgiving; otherwise no flares of vasculitis, no nasal polyps, no infections      History of Presenting Illness    2/4/2022: Had 2 sinusitis, went on 2 rounds of antibiotics. Had 3 different bacteria on the culture. Off antibiotic x 2 weeks, resumed AZA x past 2 weeks. Still gets PND and coughs a lot. ENT saw inflammation on exam with no infection, this was about 2.5 wk ago when he was recommended to resume AZA. Was off AZA from 9/2021 till two weeks ago.  1st and 2nd round of antibiotic, did not come off AZA, just with 3rd round. Has some SOB, uses inhalers more. Gets winded more with exercise.     8/19/2022: His nose gets stuffy on lying down. Saw ENT and was given prednisone and it helped some, but cough and congestion is coming back off prednisone. Took last prednisone last week. It was 40 mg every day max. Seasonal allergies are triggers. Sometimes it is harder to breathe when he is working out.     9/23/2022: Lucio presents for follow up, continues to have flares of sinusitis with nasal polyps despite being on nucala, which are steroid responsive.    Mj Mason Jr. 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    He is doing fairly well. No recurrence of nasal polyps on imuran. Last seen by ENT in 12/2020, has upcoming appointment. Reports having seasonal allergies at this time of the year, has been using his inhalers x past 2 weeks. Reports some nasal congestion, it was better when he ran out of AZA x 3 days early this week.    He sleeps at the side, has block nostril, was better  on medrol dose pack.    Medrol dose pack finished yesterday. Last antibiotic was a month ago. Medrol works better, prednisone does not help.    Uses inhaler to prevent asthma.    No joint pain or skin rash.    Had surgery for R shoulder rotator cuff full tear which was successful.    Had J&J covid vaccine on 4/8/2021, tolerated it well.    Family History   Problem Relation Age of Onset    Colon Cancer Maternal Grandmother     Stomach Cancer Paternal Grandmother     No Known Problems Mother     No Known Problems Father     No Known Problems Maternal Grandfather     No Known Problems Paternal Grandfather     No Known Problems Brother     No Known Problems Sister     No Known Problems Son     No Known Problems Daughter     No Known Problems Maternal Half-Brother     No Known Problems Maternal Half-Sister     No Known Problems Paternal Half-Brother     No Known Problems Paternal Half-Sister     No Known Problems Niece     No Known Problems Nephew     No Known Problems Cousin     No Known Problems Other     Cancer No family hx of     Diabetes No family hx of     Heart Disease No family hx of     Hypertension No family hx of     Cerebrovascular Disease No family hx of     Asthma No family hx of     Thyroid Disease No family hx of     Depression No family hx of     Mental Illness No family hx of     Substance Abuse No family hx of     Dementia No family hx of     Bleeding Disorder No family hx of     Congenital Anomalies No family hx of     Migraines No family hx of     Stomach Problem No family hx of     Muscular Disorder No family hx of     Osteoporosis No family hx of     Unknown/Adopted No family hx of      Social History     Socioeconomic History    Marital status:      Spouse name: Not on file    Number of children: 2    Years of education: Not on file    Highest education level: Not on file   Occupational History    Occupation:  manager   Tobacco Use    Smoking status: Never     Passive exposure: Past     Smokeless tobacco: Never   Substance and Sexual Activity    Alcohol use: Yes    Drug use: Never    Sexual activity: Not on file   Other Topics Concern    Not on file   Social History Narrative    Not on file     Social Determinants of Health     Financial Resource Strain: Not on file   Food Insecurity: Not on file   Transportation Needs: Not on file   Physical Activity: Not on file   Stress: Not on file   Social Connections: Not on file   Interpersonal Safety: Not on file   Housing Stability: Not on file       No Known Allergies    Outpatient Encounter Medications as of 2/15/2024   Medication Sig Dispense Refill    albuterol (PROAIR HFA/PROVENTIL HFA/VENTOLIN HFA) 108 (90 Base) MCG/ACT inhaler as needed       budesonide (PULMICORT) 0.5 MG/2ML neb solution       budesonide (PULMICORT) 1 MG/2ML neb solution Take 1 mg by nebulization daily       clindamycin phosphate (EVOCLIN) 1 % external foam as needed      FLOVENT  MCG/ACT inhaler Inhale 1 puff into the lungs daily      montelukast (SINGULAIR) 10 MG tablet Take 10 mg by mouth At Bedtime       SYMBICORT 160-4.5 MCG/ACT Inhaler Inhale 2 puffs into the lungs 2 times daily       cetirizine (ZYRTEC) 10 MG tablet Take 10 mg by mouth daily  (Patient not taking: Reported on 9/23/2022)      diclofenac (VOLTAREN) 1 % topical gel Apply 2 g topically 4 times daily as needed for moderate pain To use over R shoulder (Patient not taking: Reported on 9/23/2022) 100 g 1    levocetirizine (XYZAL) 5 MG tablet Take 5 mg by mouth every morning (Patient not taking: Reported on 3/22/2023)      methylPREDNISolone (MEDROL DOSEPAK) 4 MG tablet therapy pack Take per package instructions. As needed for flare up (Patient not taking: Reported on 3/22/2023) 21 tablet 3    tretinoin (RETIN-A) 0.1 % external cream Apply topically as needed  (Patient not taking: Reported on 9/23/2022)       No facility-administered encounter medications on file as of 2/15/2024.       ROS:  A comprehensive  ROS was done, positives are per HPI.    Ph.E:    Constitutional: WD/WN. Pleasant. In no acute distress.  HEENT: EOM intact, sclera anicteric, conj not injected. No saddle nose deformity  MS: No synovitis  Skin: No skin rash  Neuro: A&O x 3  Psych: NL affect     His records were reviewed.    4/2019: pos MPO, p-ANCA    7/2019: NL ESR/CRP    Component      Latest Ref Rng & Units 3/25/2020   WBC      4.0 - 11.0 10e9/L 3.8 (L)   RBC Count      4.4 - 5.9 10e12/L 4.53   Hemoglobin      13.3 - 17.7 g/dL 15.1   Hematocrit      40.0 - 53.0 % 45.3   MCV      78 - 100 fl 100   MCH      26.5 - 33.0 pg 33.3 (H)   MCHC      31.5 - 36.5 g/dL 33.3   RDW      10.0 - 15.0 % 13.0   Platelet Count      150 - 450 10e9/L 235   Diff Method       Automated Method   % Neutrophils      % 43.3   % Lymphocytes      % 41.0   % Monocytes      % 9.0   % Eosinophils      % 4.8   % Basophils      % 1.6   % Immature Granulocytes      % 0.3   Nucleated RBCs      0 /100 0   Absolute Neutrophil      1.6 - 8.3 10e9/L 1.6   Absolute Lymphocytes      0.8 - 5.3 10e9/L 1.5   Absolute Monocytes      0.0 - 1.3 10e9/L 0.3   Absolute Eosinophils      0.0 - 0.7 10e9/L 0.2   Absolute Basophils      0.0 - 0.2 10e9/L 0.1   Abs Immature Granulocytes      0 - 0.4 10e9/L 0.0   Absolute Nucleated RBC       0.0   Sodium      133 - 144 mmol/L 140   Potassium      3.4 - 5.3 mmol/L 3.6   Chloride      94 - 109 mmol/L 106   Carbon Dioxide      20 - 32 mmol/L 32   Anion Gap      3 - 14 mmol/L 2 (L)   Glucose      70 - 99 mg/dL 53 (L)   Urea Nitrogen      7 - 30 mg/dL 22   Creatinine      0.66 - 1.25 mg/dL 0.98   GFR Estimate      >60 mL/min/1.73:m2 >90   GFR Estimate If Black      >60 mL/min/1.73:m2 >90   Calcium      8.5 - 10.1 mg/dL 9.1   Bilirubin Total      0.2 - 1.3 mg/dL 0.6   Albumin      3.4 - 5.0 g/dL 4.4   Protein Total      6.8 - 8.8 g/dL 7.5   Alkaline Phosphatase      40 - 150 U/L 52   ALT      0 - 70 U/L 45   AST      0 - 45 U/L 28   Color Urine       Yellow    Appearance Urine       Clear   Glucose Urine      NEG:Negative mg/dL Negative   Bilirubin Urine      NEG:Negative Negative   Ketones Urine      NEG:Negative mg/dL Negative   Specific Gravity Urine      1.003 - 1.035 1.006   Blood Urine      NEG:Negative Negative   pH Urine      5.0 - 7.0 pH 7.0   Protein Albumin Urine      NEG:Negative mg/dL Negative   Urobilinogen mg/dL      0.0 - 2.0 mg/dL 0.0   Nitrite Urine      NEG:Negative Negative   Leukocyte Esterase Urine      NEG:Negative Negative   Source       Midstream Urine   WBC Urine      0 - 5 /HPF 0   RBC Urine      0 - 2 /HPF 0   Squamous Epithelial /HPF Urine      0 - 1 /HPF <1   Neutrophil Cytoplasmic Antibody      <1:10 titer <1:10   Neutrophil Cytoplasmic Antibody Pattern       The ANCA IFA is <1:10.  No further testing will be performed.   Protein Random Urine      g/L <0.05   Protein Total Urine g/gr Creatinine      0 - 0.2 g/g Cr Unable to calculate due to low value   Myeloperoxidase Antibody IgG      0.0 - 0.9 AI 4.7 (H)   Proteinase 3 Antibody IgG      0.0 - 0.9 AI <0.2   IGE      0 - 114 KIU/L 22   CRP Inflammation      0.0 - 8.0 mg/L <2.9   Sed Rate      0 - 15 mm/h 5   Creatinine Urine      mg/dL 24     Stable labs in 2/2021    Component      Latest Ref Rng & Units 11/16/2021   Color Urine      Colorless, Straw, Light Yellow, Yellow Yellow   Appearance Urine      Clear Clear   Glucose Urine      Negative mg/dL Negative   Bilirubin Urine      Negative Negative   Ketones Urine      Negative mg/dL Negative   Specific Gravity Urine      1.003 - 1.035 1.009   Blood Urine      Negative Negative   pH Urine      5.0 - 7.0 6.0   Protein Albumin Urine      Negative mg/dL Negative   Urobilinogen mg/dL      Normal, 2.0 mg/dL Normal   Nitrite Urine      Negative Negative   Leukocyte Esterase Urine      Negative Negative   RBC Urine      <=2 /HPF <1   WBC Urine      <=5 /HPF 0   MPO Zenaida IgG Instrument Value      <3.5 U/mL 17.0 (H)   Myeloperoxidase Antibody IgG       Negative Positive (A)   Proteinase 3 Zenaida IgG Instrument Value      <2.0 U/mL <1.0   Proteinase 3 Antibody IgG      Negative Negative   Protein Random Urine      g/L <0.05   Protein Total Urine g/gr Creatinine          Creatinine Urine      mg/dL 37   IGG      610-1,616 mg/dL 1,053   IGA      84 - 499 mg/dL 178   IGM      35 - 242 mg/dL 28 (L)   Creatinine      0.66 - 1.25 mg/dL 0.92   GFR Estimate      >60 mL/min/1.73m2 >90   CD19 B Cells      6 - 27 % 7   Absolute CD19      107 - 698 cells/uL 92 (L)   Neutrophil Cytoplasmic Antibody      <1:10 <1:10   Neutrophil Cytoplasmic Antibody Pattern       The ANCA IFA is <1:10.  No further testing will be performed.   AST      0 - 45 U/L 34   ALT      0 - 70 U/L 46   Albumin      3.4 - 5.0 g/dL 3.8   CRP Inflammation      0.0 - 8.0 mg/L <2.9   Sed Rate      0 - 15 mm/hr 10     Component      Latest Ref Rng & Units 2/1/2022   Color Urine      Colorless, Straw, Light Yellow, Yellow Yellow   Appearance Urine      Clear Clear   Glucose Urine      Negative mg/dL Negative   Bilirubin Urine      Negative Negative   Ketones Urine      Negative mg/dL Negative   Specific Gravity Urine      1.003 - 1.035 1.004   Blood Urine      Negative Negative   pH Urine      5.0 - 7.0 6.0   Protein Albumin Urine      Negative mg/dL Negative   Urobilinogen mg/dL      Normal, 2.0 mg/dL Normal   Nitrite Urine      Negative Negative   Leukocyte Esterase Urine      Negative Negative   RBC Urine      <=2 /HPF <1   WBC Urine      <=5 /HPF <1   MPO Zenaida IgG Instrument Value      <3.5 U/mL    Myeloperoxidase Antibody IgG      Negative    Proteinase 3 Zenaida IgG Instrument Value      <2.0 U/mL    Proteinase 3 Antibody IgG      Negative    Protein Random Urine      g/L <0.05   Protein Total Urine g/gr Creatinine          Creatinine Urine      mg/dL 24   IGG      610-1,616 mg/dL 1,146   IGA      84 - 499 mg/dL 168   IGM      35 - 242 mg/dL 30 (L)   Creatinine      0.66 - 1.25 mg/dL 1.06   GFR Estimate      >60  mL/min/1.73m2 89   CD19 B Cells      6 - 27 % 7   Absolute CD19      107 - 698 cells/uL 116   Neutrophil Cytoplasmic Antibody      <1:10 1:160 (H)   Neutrophil Cytoplasmic Antibody Pattern       Perinuclear ANCA (p-ANCA) staining pattern observed and confirmed on formalin-fixed neutrophils.   AST      0 - 45 U/L 38   ALT      0 - 70 U/L 40   Albumin      3.4 - 5.0 g/dL 3.7   CRP Inflammation      0.0 - 8.0 mg/L 7.4   Sed Rate      0 - 15 mm/hr 13     Component      Latest Ref Rng & Units 2/1/2022   WBC      4.0 - 11.0 10e3/uL 5.5   RBC Count      4.40 - 5.90 10e6/uL 4.39 (L)   Hemoglobin      13.3 - 17.7 g/dL 14.9   Hematocrit      40.0 - 53.0 % 42.8   MCV      78 - 100 fL 98   MCH      26.5 - 33.0 pg 33.9 (H)   MCHC      31.5 - 36.5 g/dL 34.8   RDW      10.0 - 15.0 % 12.2   Platelet Count      150 - 450 10e3/uL 257   % Neutrophils      % 54   % Lymphocytes      % 25   % Monocytes      % 10   % Eosinophils      % 9   % Basophils      % 2   % Immature Granulocytes      % 0   NRBCs per 100 WBC      <1 /100 0   Absolute Neutrophils      1.6 - 8.3 10e3/uL 3.0   Absolute Lymphocytes      0.8 - 5.3 10e3/uL 1.3   Absolute Monocytes      0.0 - 1.3 10e3/uL 0.6   Absolute Eosinophils      0.0 - 0.7 10e3/uL 0.5   Absolute Basophils      0.0 - 0.2 10e3/uL 0.1   Absolute Immature Granulocytes      <=0.4 10e3/uL 0.0   Absolute NRBCs      10e3/uL 0.0   Color Urine      Colorless, Straw, Light Yellow, Yellow Yellow   Appearance Urine      Clear Clear   Glucose Urine      Negative mg/dL Negative   Bilirubin Urine      Negative Negative   Ketones Urine      Negative mg/dL Negative   Specific Gravity Urine      1.003 - 1.035 1.004   Blood Urine      Negative Negative   pH Urine      5.0 - 7.0 6.0   Protein Albumin Urine      Negative mg/dL Negative   Urobilinogen mg/dL      Normal, 2.0 mg/dL Normal   Nitrite Urine      Negative Negative   Leukocyte Esterase Urine      Negative Negative   RBC Urine      <=2 /HPF <1   WBC Urine      <=5  /HPF <1   MPO Zenaida IgG Instrument Value      <3.5 U/mL 102.0 (H)   Myeloperoxidase Antibody IgG      Negative Positive (A)   Proteinase 3 Zenaida IgG Instrument Value      <2.0 U/mL <1.0   Proteinase 3 Antibody IgG      Negative Negative   IGG      610-1,616 mg/dL 1,146   IGA      84 - 499 mg/dL 168   IGM      35 - 242 mg/dL 30 (L)   Protein Random Urine      g/L <0.05   Protein Total Urine g/gr Creatinine          Creatinine Urine      mg/dL 24   Neutrophil Cytoplasmic Antibody      <1:10 1:160 (H)   Neutrophil Cytoplasmic Antibody Pattern       Perinuclear ANCA (p-ANCA) staining pattern observed and confirmed on formalin-fixed neutrophils.   CD19 B Cells      6 - 27 % 7   Absolute CD19      107 - 698 cells/uL 116   Creatinine      0.66 - 1.25 mg/dL 1.06   GFR Estimate      >60 mL/min/1.73m2 89   Sed Rate      0 - 15 mm/hr 13   CRP Inflammation      0.0 - 8.0 mg/L 7.4   Albumin      3.4 - 5.0 g/dL 3.7   ALT      0 - 70 U/L 40   AST      0 - 45 U/L 38     Component      Latest Ref Rng & Units 9/12/2022   WBC      4.0 - 11.0 10e3/uL 5.0   RBC Count      4.40 - 5.90 10e6/uL 4.50   Hemoglobin      13.3 - 17.7 g/dL 15.0   Hematocrit      40.0 - 53.0 % 43.5   MCV      78 - 100 fL 97   MCH      26.5 - 33.0 pg 33.3 (H)   MCHC      31.5 - 36.5 g/dL 34.5   RDW      10.0 - 15.0 % 12.5   Platelet Count      150 - 450 10e3/uL 221   % Neutrophils      % 49   % Lymphocytes      % 36   % Monocytes      % 12   % Eosinophils      % 1   % Basophils      % 1   % Immature Granulocytes      % 1   NRBCs per 100 WBC      <1 /100 0   Absolute Neutrophils      1.6 - 8.3 10e3/uL 2.4   Absolute Lymphocytes      0.8 - 5.3 10e3/uL 1.8   Absolute Monocytes      0.0 - 1.3 10e3/uL 0.6   Absolute Eosinophils      0.0 - 0.7 10e3/uL 0.0   Absolute Basophils      0.0 - 0.2 10e3/uL 0.1   Absolute Immature Granulocytes      <=0.4 10e3/uL 0.0   Absolute NRBCs      10e3/uL 0.0   Color Urine      Colorless, Straw, Light Yellow, Yellow Yellow   Appearance  Urine      Clear Clear   Glucose Urine      Negative mg/dL Negative   Bilirubin Urine      Negative Negative   Ketones Urine      Negative mg/dL Negative   Specific Gravity Urine      1.003 - 1.035 1.005   Blood Urine      Negative Negative   pH Urine      5.0 - 7.0 5.0   Protein Albumin Urine      Negative mg/dL Negative   Urobilinogen mg/dL      Normal, 2.0 mg/dL Normal   Nitrite Urine      Negative Negative   Leukocyte Esterase Urine      Negative Negative   RBC Urine      <=2 /HPF 0   WBC Urine      <=5 /HPF 0   MPO Zenaida IgG Instrument Value      <3.5 U/mL 8.0 (H)   Myeloperoxidase Antibody IgG      Negative Positive (A)   Proteinase 3 Zenaida IgG Instrument Value      <2.0 U/mL <1.0   Proteinase 3 Antibody IgG      Negative Negative   Total Protein Urine mg/dL      mg/dL <6.0   Total Protein UR MG/MG CR          Creatinine Urine      mg/dL 20.3   IGG      610 - 1,616 mg/dL 906   IGA      84 - 499 mg/dL 160   IGM      35 - 242 mg/dL 32 (L)   Creatinine      0.67 - 1.17 mg/dL 1.06   GFR Estimate      >60 mL/min/1.73m2 89   CD19 B Cells      6 - 27 % 8   Absolute CD19      107 - 698 cells/uL 151   Neutrophil Cytoplasmic Antibody      <1:10 <1:10   Neutrophil Cytoplasmic Antibody Pattern       The ANCA IFA is <1:10.  No further testing will be performed.   AST      10 - 50 U/L 36   ALT      10 - 50 U/L 35   Albumin      3.5 - 5.2 g/dL 4.5   CRP Inflammation      <5.00 mg/L <3.00   Sed Rate      0 - 15 mm/hr 6     Component      Latest Ref Rng & Units 11/1/2022   WBC      4.0 - 11.0 10e3/uL 6.4   RBC Count      4.40 - 5.90 10e6/uL 4.44   Hemoglobin      13.3 - 17.7 g/dL 14.8   Hematocrit      40.0 - 53.0 % 42.7   MCV      78 - 100 fL 96   MCH      26.5 - 33.0 pg 33.3 (H)   MCHC      31.5 - 36.5 g/dL 34.7   RDW      10.0 - 15.0 % 12.5   Platelet Count      150 - 450 10e3/uL 247   % Neutrophils      % 62   % Lymphocytes      % 26   % Monocytes      % 9   % Eosinophils      % 2   % Basophils      % 1   % Immature  Granulocytes      % 0   NRBCs per 100 WBC      <1 /100 0   Absolute Neutrophils      1.6 - 8.3 10e3/uL 4.0   Absolute Lymphocytes      0.8 - 5.3 10e3/uL 1.7   Absolute Monocytes      0.0 - 1.3 10e3/uL 0.6   Absolute Eosinophils      0.0 - 0.7 10e3/uL 0.1   Absolute Basophils      0.0 - 0.2 10e3/uL 0.1   Absolute Immature Granulocytes      <=0.4 10e3/uL 0.0   Absolute NRBCs      10e3/uL 0.0   Color Urine      Colorless, Straw, Light Yellow, Yellow Straw   Appearance Urine      Clear Clear   Glucose Urine      Negative mg/dL Negative   Bilirubin Urine      Negative Negative   Ketones Urine      Negative mg/dL Negative   Specific Gravity Urine      1.003 - 1.035 1.010   Blood Urine      Negative Negative   pH Urine      5.0 - 7.0 6.0   Protein Albumin Urine      Negative mg/dL Negative   Urobilinogen mg/dL      Normal, 2.0 mg/dL Normal   Nitrite Urine      Negative Negative   Leukocyte Esterase Urine      Negative Negative   RBC Urine      <=2 /HPF 0   WBC Urine      <=5 /HPF <1   MPO Zenaida IgG Instrument Value      <3.5 U/mL 1.5   Myeloperoxidase Antibody IgG      Negative Negative   Proteinase 3 Zenaida IgG Instrument Value      <2.0 U/mL <1.0   Proteinase 3 Antibody IgG      Negative Negative   Total Protein Urine mg/dL      mg/dL <6.0   Total Protein UR MG/MG CR          Creatinine Urine      mg/dL 36.7   IGG      610 - 1,616 mg/dL 948   IGA      84 - 499 mg/dL 154   IGM      35 - 242 mg/dL 27 (L)   Creatinine      0.67 - 1.17 mg/dL 0.97   GFR Estimate      >60 mL/min/1.73m2 >90   CD19 B Cells      6 - 27 % 13   Absolute CD19      107 - 698 cells/uL 240   Neutrophil Cytoplasmic Antibody      <1:10 <1:10   Neutrophil Cytoplasmic Antibody Pattern       The ANCA IFA is <1:10.  No further testing will be performed.   AST      10 - 50 U/L 44   ALT      10 - 50 U/L 37   Albumin      3.5 - 5.2 g/dL 4.6   CRP Inflammation      <5.00 mg/L <3.00   Sed Rate      0 - 15 mm/hr 6     Component      Latest Ref [facility] 6/6/2023  11:57 AM    WBC      4.0 - 11.0 10e3/uL 6.3    RBC Count      4.40 - 5.90 10e6/uL 4.22 (L)    Hemoglobin      13.3 - 17.7 g/dL 14.0    Hematocrit      40.0 - 53.0 % 40.6    MCV      78 - 100 fL 96    MCH      26.5 - 33.0 pg 33.2 (H)    MCHC      31.5 - 36.5 g/dL 34.5    RDW      10.0 - 15.0 % 12.3    Platelet Count      150 - 450 10e3/uL 325    % Neutrophils      % 58    % Lymphocytes      % 26    % Monocytes      % 8    % Eosinophils      % 6    % Basophils      % 2    % Immature Granulocytes      % 0    NRBCs per 100 WBC      <1 /100 0    Absolute Neutrophils      1.6 - 8.3 10e3/uL 3.7    Absolute Lymphocytes      0.8 - 5.3 10e3/uL 1.7    Absolute Monocytes      0.0 - 1.3 10e3/uL 0.5    Absolute Eosinophils      0.0 - 0.7 10e3/uL 0.4    Absolute Basophils      0.0 - 0.2 10e3/uL 0.1    Absolute Immature Granulocytes      <=0.4 10e3/uL 0.0    Absolute NRBCs      10e3/uL 0.0    Color Urine      Colorless, Straw, Light Yellow, Yellow     Appearance Urine      Clear     Glucose Urine      Negative mg/dL    Bilirubin Urine      Negative     Ketones Urine      Negative mg/dL    Specific Gravity Urine      1.003 - 1.035     Blood Urine      Negative     pH Urine      5.0 - 7.0     Protein Albumin Urine      Negative mg/dL    Urobilinogen mg/dL      Normal, 2.0 mg/dL    Nitrite Urine      Negative     Leukocyte Esterase Urine      Negative     RBC Urine      <=2 /HPF    WBC Urine      <=5 /HPF    MPO Zenaida IgG Instrument Value      <3.5 U/mL 12.0 (H)    Myeloperoxidase Antibody IgG      Negative  Positive !    Proteinase 3 Zenaida IgG Instrument Value      <2.0 U/mL <1.0    Proteinase 3 Antibody IgG      Negative  Negative    Total Protein Urine mg/dL      1.0 - 14.0 mg/dL    Total Protein UR MG/MG CR    Creatinine Urine      mg/dL    CD19 B Cells      6 - 27 % 11    Absolute CD19      107 - 698 cells/uL 182    IGG      610 - 1,616 mg/dL 1,204    IGA      84 - 499 mg/dL 183    IGM      35 - 242 mg/dL 49    Creatinine      0.67 - 1.17  mg/dL 0.89    GFR Estimate      >60 mL/min/1.73m2 >90    Neutrophil Cytoplasmic Antibody      <1:10  1:10 (H)    Neutrophil Cytoplasmic Antibody Pattern Cytoplasmic ANCA (c-ANCA) staining pattern observed and confirmed on formalin-fixed neutrophils.    AST      10 - 50 U/L 41    ALT      10 - 50 U/L 47    Albumin      3.5 - 5.2 g/dL 4.3    CRP Inflammation      <5.00 mg/L 4.24    Sed Rate      0 - 15 mm/hr 18 (H)      Component      Latest Ref [facility] 6/6/2023  12:00 PM   WBC      4.0 - 11.0 10e3/uL    RBC Count      4.40 - 5.90 10e6/uL    Hemoglobin      13.3 - 17.7 g/dL    Hematocrit      40.0 - 53.0 %    MCV      78 - 100 fL    MCH      26.5 - 33.0 pg    MCHC      31.5 - 36.5 g/dL    RDW      10.0 - 15.0 %    Platelet Count      150 - 450 10e3/uL    % Neutrophils      %    % Lymphocytes      %    % Monocytes      %    % Eosinophils      %    % Basophils      %    % Immature Granulocytes      %    NRBCs per 100 WBC      <1 /100    Absolute Neutrophils      1.6 - 8.3 10e3/uL    Absolute Lymphocytes      0.8 - 5.3 10e3/uL    Absolute Monocytes      0.0 - 1.3 10e3/uL    Absolute Eosinophils      0.0 - 0.7 10e3/uL    Absolute Basophils      0.0 - 0.2 10e3/uL    Absolute Immature Granulocytes      <=0.4 10e3/uL    Absolute NRBCs      10e3/uL    Color Urine      Colorless, Straw, Light Yellow, Yellow  Straw    Appearance Urine      Clear  Clear    Glucose Urine      Negative mg/dL Negative    Bilirubin Urine      Negative  Negative    Ketones Urine      Negative mg/dL Negative    Specific Gravity Urine      1.003 - 1.035  1.009    Blood Urine      Negative  Negative    pH Urine      5.0 - 7.0  5.5    Protein Albumin Urine      Negative mg/dL Negative    Urobilinogen mg/dL      Normal, 2.0 mg/dL Normal    Nitrite Urine      Negative  Negative    Leukocyte Esterase Urine      Negative  Negative    RBC Urine      <=2 /HPF <1    WBC Urine      <=5 /HPF <1    MPO Zenaida IgG Instrument Value      <3.5 U/mL    Myeloperoxidase  Antibody IgG      Negative     Proteinase 3 Zenaida IgG Instrument Value      <2.0 U/mL    Proteinase 3 Antibody IgG      Negative     Total Protein Urine mg/dL      1.0 - 14.0 mg/dL <6.0    Total Protein UR MG/MG CR --    Creatinine Urine      mg/dL 27.9    CD19 B Cells      6 - 27 %    Absolute CD19      107 - 698 cells/uL    IGG      610 - 1,616 mg/dL    IGA      84 - 499 mg/dL    IGM      35 - 242 mg/dL    Creatinine      0.67 - 1.17 mg/dL    GFR Estimate      >60 mL/min/1.73m2    Neutrophil Cytoplasmic Antibody      <1:10     Neutrophil Cytoplasmic Antibody Pattern    AST      10 - 50 U/L    ALT      10 - 50 U/L    Albumin      3.5 - 5.2 g/dL    CRP Inflammation      <5.00 mg/L    Sed Rate      0 - 15 mm/hr       Legend:  (L) Low  (H) High  ! Abnormal  Component      Latest Ref Rng 10/11/2023  11:32 AM   MPO Zenaida IgG Instrument Value      <3.5 U/mL 12.0 (H)    Myeloperoxidase Antibody IgG      Negative  Positive !    Proteinase 3 Zenaida IgG Instrument Value      <2.0 U/mL <1.0    Proteinase 3 Antibody IgG      Negative  Negative    Neutrophil Cytoplasmic Antibody      <1:10  <1:10    Neutrophil Cytoplasmic Antibody Pattern The ANCA IFA is <1:10. No further testing will be performed.       Legend:  (H) High  ! Abnormal      Isabella Manley MD

## 2024-02-15 NOTE — TELEPHONE ENCOUNTER
Lab orders faxed to [facility] lab, confirmed receipt.    Call to patient to confirm that orders have been faxed. Patient has already scheduled lab appt.    All questions answered.    JAY HarkinsN, RN  RN Care Coordinator Rheumatology

## 2024-02-19 ENCOUNTER — TELEPHONE (OUTPATIENT)
Dept: RHEUMATOLOGY | Facility: CLINIC | Age: 44
End: 2024-02-19
Payer: COMMERCIAL

## 2024-02-19 NOTE — TELEPHONE ENCOUNTER
Patient Contacted for the patient to call back and schedule the following:    Appointment type: Return Video Visit  Provider: Dr. Manley  Return date: August 14th, 2024  Specialty phone number: [phone]  Additional appointment(s) needed: NA  Additonal Notes: NA

## 2024-04-02 ENCOUNTER — TELEPHONE (OUTPATIENT)
Dept: RHEUMATOLOGY | Facility: CLINIC | Age: 44
End: 2024-04-02
Payer: COMMERCIAL

## 2024-04-02 NOTE — TELEPHONE ENCOUNTER
Patient confirmed scheduled appointment:  Date: August 21st 2024  Time: 2pm  Visit type: Video Visit Return  Provider: Dr. Manley  Location: [facility]  Testing/imaging: NA  Additional notes: Rescheduled from August 14th
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
## 2024-05-02 DIAGNOSIS — D72.18 EOSINOPHILIC GRANULOMATOSIS WITH POLYANGIITIS (EGPA) (H): Primary | ICD-10-CM

## 2024-05-02 DIAGNOSIS — M30.1 EOSINOPHILIC GRANULOMATOSIS WITH POLYANGIITIS (EGPA) (H): Primary | ICD-10-CM

## 2024-05-02 DIAGNOSIS — Z51.81 MEDICATION MONITORING ENCOUNTER: ICD-10-CM

## 2024-05-10 ENCOUNTER — MYC MEDICAL ADVICE (OUTPATIENT)
Dept: RHEUMATOLOGY | Facility: CLINIC | Age: 44
End: 2024-05-10
Payer: COMMERCIAL

## 2024-05-15 ENCOUNTER — OFFICE VISIT (OUTPATIENT)
Dept: RHEUMATOLOGY | Facility: CLINIC | Age: 44
End: 2024-05-15
Attending: INTERNAL MEDICINE
Payer: COMMERCIAL

## 2024-05-15 VITALS
WEIGHT: 194.8 LBS | SYSTOLIC BLOOD PRESSURE: 119 MMHG | HEART RATE: 63 BPM | DIASTOLIC BLOOD PRESSURE: 75 MMHG | BODY MASS INDEX: 29.62 KG/M2 | OXYGEN SATURATION: 96 %

## 2024-05-15 DIAGNOSIS — Z51.81 MEDICATION MONITORING ENCOUNTER: ICD-10-CM

## 2024-05-15 DIAGNOSIS — D72.18 EOSINOPHILIC GRANULOMATOSIS WITH POLYANGIITIS (EGPA) (H): Primary | ICD-10-CM

## 2024-05-15 DIAGNOSIS — M30.1 EOSINOPHILIC GRANULOMATOSIS WITH POLYANGIITIS (EGPA) (H): Primary | ICD-10-CM

## 2024-05-15 PROCEDURE — 99215 OFFICE O/P EST HI 40 MIN: CPT | Performed by: INTERNAL MEDICINE

## 2024-05-15 PROCEDURE — 99213 OFFICE O/P EST LOW 20 MIN: CPT | Performed by: INTERNAL MEDICINE

## 2024-05-15 ASSESSMENT — PAIN SCALES - GENERAL: PAINLEVEL: NO PAIN (0)

## 2024-05-15 NOTE — PATIENT INSTRUCTIONS
Benralizumab (Fasenra) will be ordered by pulmonology, if fails Dupixent will be next    Labs in 2 weeks    Keep Aug appointment

## 2024-05-15 NOTE — LETTER
5/15/2024       RE: Mj Mason .  [address]  [place] [address]     Dear Colleague,    Thank you for referring your patient, Mj Mason Jr., to the [facility] [place] at [facility]. Please see a copy of my visit note below.    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 He has previously failed methotrexate, azathioprine, Nucala and, most recently, MMF due to recurrent sinusitis and respiratory symptoms.     11/2022: Specifically, methotrexate 8 tabs weekly was associated with recurrent URI with no improvement in symptoms on 6 tabs weekly. He also then transitioned to AZA in 8/2019 (NL TPMT) but was put on hold in 11/2019 given recurrent sinusitis and antibiotic tx, ultimately requiring sinus surgery/polypectomy on 12/10/2019. He retried AZA 50 mg bid on 3/2020 but had a transient elevated LFTs and improvement of MPO even before resuming AZA. He subsequently trialed Nucala on 2/2022 but discontinued on 9/2022 due to persistent sinusitis and symptoms flares. 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    At this time, the patient is still completing his antibiotic course for Moraxella sinusitis and would prefer to hold off on further immunosuppression. He does feel that his symptoms are controlled at present on antibiotics and Medrol dose pack. We discussed risks and benefits of not restarting immunosuppression, and he was agreeable to considering a  retrial of MMF pending further discussion at follow-up in 3 months. Exam today was otherwise unremarkable, and labs were notable for unremarkable inflammatory markers and ANCA.     3/22/2023: Resumed  mg bid recently, was on it for about 3 wk, but started having sinus sx 2 wk ago, stopped MMF (cellcept), took medrol dose pack, no antibiotic needed this time. Stable labs from 11/2022 with neg ANCA and normal ESR/CRP, no asthma flares and reportedly almost no recurrence of nasal polyps. Feeling well today. Will check labs this month and monitor off MMF for flares. If he continues to flare up with sinus sx, will consider a trial of rituximab. Risks were discussed.       7/19/2023: stable with stable labs (except slight elevation of vasculitis markers), will monitor off tx. Recent labs were reviewed and discussed.    Today's Plan:    Continue follow-up with ENT    Labs (just ANCA, MPO) in 9/2023 and every 3 months      Return in 6 months (video), could be re-scheduled if no complaints    2/15/2024:    Stable ANCA vasculitis off Tx, stable labs. Will continue to monitor sx off tx.    Plan:    Labs at [facility], please fax orders    Return video visit in 6 months      Today 5/15/2024:    EGPA flare responding to high dose steroid taper, I called and spoke with Mj's pulmonologist and we agreed on trying Fasenra next. Lupillo was present during discussion and agreed with the plan. He has tried and failed AZA, MMF, MTX and nucala.    Today 5/15/2024 plan:    Benralizumab (Fasenra) will be ordered by pulmonology, if fails Dupixent will be next    Labs in 2 weeks (will do it after being off steroid)    Keep Aug appointment        Isabella Manley MD    HPI:    8/20/2021: On AZA 50 mg bid since end of 3/2020. Tolerates it well. Reports no recurrence of nasal polyps. He will do follow up with ENT. Labs from 2/2021 are stable. His EGPA seems to be stable. Reports seasonal allergies at this time of the year, using inhalor.  Nasal congestion got better by missing AZA x 3 days.    We discussed covid booster vaccine, no official recommendation for J&J receivers yet but most likely will need a booster being immunocompromised.    2/4/2022: Flaring with EGPA, sinus inflammation, increased vasculitis markers. On and off AZA due to sinus infections. Recommend to switch to nucala to control EGPA; risks were discussed. Will treat with prednisone taper.    8/18/2022: Unclear if sinus sx are due to vasculitis and if he fails nucala or not, will get ENT opinion and check labs.    9/23/2022: Labs are stable, but clinically he continues to flare with sinusitis, nasal polyps, steroid responsive. Since he failed nucala, recommend to stop. Discussed next tx options cellcept versus rituximab. Agreed to try cellcept as safer option next; risks were discussed.    11/30/2022:    Patient reports that he discontinued MMF 2 weeks ago due to worsening congestion, post nasal drip, cough and shortness of breath with subsequent ENT evaluation revealing nasal culture positive sinus infection on 11/11/22. He was started on an unspecified antibiotic on 11/16 and began PRN Medrol dose pack provided by Rheumatology staff with immediate improvement in symptoms. He now continues to have aforementioned symptoms but reduced in severity.    No other acute complaints when seen today. Patient overall reports feeling that symptoms are controlled on antibiotics and steroids and would prefer to wait on restarting MMF or other immunosuppressive regimen at this time.       3/22/2023:     Tried MMF again for about 3wk [place], got recurrence of sinus sx, tried medrol dose pack and stopped cellcept, this was 2 wk ago, medrol helped.    Asthma is well controlled on Symbicort inh only, has not required rescue inhaler.     No new sx of rash, neuropathy, joint pain.    Feeling well today.    7/19/2023:    -Sometimes sinuses feels plugged on especially with  moaning the lawn, uses Symbicort once-twice a day, uses rescue inhaler less often.    -No more steroid use since last visit    -No recent infection needing antibiotic        History of Presenting Illness    2/4/2022: Had 2 sinusitis, went on 2 rounds of antibiotics. Had 3 different bacteria on the culture. Off antibiotic x 2 weeks, resumed AZA x past 2 weeks. Still gets PND and coughs a lot. ENT saw inflammation on exam with no infection, this was about 2.5 wk ago when he was recommended to resume AZA. Was off AZA from 9/2021 till two weeks ago.  1st and 2nd round of antibiotic, did not come off AZA, just with 3rd round. Has some SOB, uses inhalers more. Gets winded more with exercise.     8/19/2022: His nose gets stuffy on lying down. Saw ENT and was given prednisone and it helped some, but cough and congestion is coming back off prednisone. Took last prednisone last week. It was 40 mg every day max. Seasonal allergies are triggers. Sometimes it is harder to breathe when he is working out.     9/23/2022: Lucio presents for follow up, continues to have flares of sinusitis with nasal polyps despite being on nucala, which are steroid responsive.    Mj Isabella Brown. 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    He is doing fairly well. No recurrence of nasal polyps on imuran. Last seen by ENT in 12/2020, has upcoming appointment. Reports having seasonal allergies at this time of the year, has been using his inhalers x past 2 weeks. Reports some nasal congestion, it was better when he ran out of AZA x 3 days early this week.    He sleeps at the side, has block nostril, was better on medrol dose pack.    Medrol dose pack finished yesterday. Last antibiotic was a month ago. Medrol works better, prednisone does not help.    Uses inhaler to prevent asthma.    No joint pain or skin rash.    Had surgery for R shoulder rotator cuff full tear which was successful.    Had J&J covid vaccine on  4/8/2021, tolerated it well.        2/15/2024:      Was feeling stuffy due to allergies around Thanksgiving; otherwise no flares of vasculitis, no nasal polyps, no infections      About 1.5 months ago, started having more coughing with more mucous, harder to breath wit hwork out, then started using inhlaer, same as petra Dietrich gfdifferent pollens    Went and had polyps checked, minimal nasal polyps but they have been stable, not as bad as years ago,     Always gets congested    Today 5/15/2024:    Presents with EGPA flare, managed by pulmonary    Prednisone 60 mg every day-40-20 mg a day, 1 wk after steroid, cough resolved and went away    Cough is coming back little bit now     Will be down on prednisone 10 mg qd tomorrow    Sense of smell is back    No skin rash, joint pain, foot or wrist drop, numbness/tingling    Does work out    Family History   Problem Relation Age of Onset    Colon Cancer Maternal Grandmother     Stomach Cancer Paternal Grandmother     No Known Problems Mother     No Known Problems Father     No Known Problems Maternal Grandfather     No Known Problems Paternal Grandfather     No Known Problems Brother     No Known Problems Sister     No Known Problems Son     No Known Problems Daughter     No Known Problems Maternal Half-Brother     No Known Problems Maternal Half-Sister     No Known Problems Paternal Half-Brother     No Known Problems Paternal Half-Sister     No Known Problems Niece     No Known Problems Nephew     No Known Problems Cousin     No Known Problems Other     Cancer No family hx of     Diabetes No family hx of     Heart Disease No family hx of     Hypertension No family hx of     Cerebrovascular Disease No family hx of     Asthma No family hx of     Thyroid Disease No family hx of     Depression No family hx of     Mental Illness No family hx of     Substance Abuse No family hx of     Dementia No family hx of     Bleeding Disorder No family hx of     Congenital Anomalies No  family hx of     Migraines No family hx of     Stomach Problem No family hx of     Muscular Disorder No family hx of     Osteoporosis No family hx of     Unknown/Adopted No family hx of      Social History     Socioeconomic History    Marital status:      Spouse name: Not on file    Number of children: 2    Years of education: Not on file    Highest education level: Not on file   Occupational History    Occupation:  manager   Tobacco Use    Smoking status: Never     Passive exposure: Past    Smokeless tobacco: Never   Substance and Sexual Activity    Alcohol use: Yes    Drug use: Never    Sexual activity: Not on file   Other Topics Concern    Not on file   Social History Narrative    Not on file     Social Determinants of Health     Financial Resource Strain: High Risk (12/22/2021)    [facility], [facility]    Financial Resource Strain     Difficulty of Paying Living Expenses: Not on file     Difficulty of Paying Living Expenses: Not on file   Food Insecurity: Not on file   Transportation Needs: Not on file   Physical Activity: Not on file   Stress: Not on file   Social Connections: Unknown (12/22/2021)    [facility], [place]    Social Connections     Frequency of Communication with Friends and Family: Not on file   Interpersonal Safety: Not on file   Housing Stability: Not on file       No Known Allergies    Outpatient Encounter Medications as of 5/15/2024   Medication Sig Dispense Refill    albuterol (PROAIR HFA/PROVENTIL HFA/VENTOLIN HFA) 108 (90 Base) MCG/ACT inhaler as needed       budesonide (PULMICORT) 0.5 MG/2ML neb solution       FLOVENT  MCG/ACT inhaler Inhale 1 puff into the lungs daily      montelukast (SINGULAIR) 10 MG tablet Take 10 mg by mouth At Bedtime       SYMBICORT 160-4.5 MCG/ACT Inhaler Inhale 2 puffs into  the lungs 2 times daily       levocetirizine (XYZAL) 5 MG tablet Take 5 mg by mouth every morning (Patient not taking: Reported on 3/22/2023)      methylPREDNISolone (MEDROL DOSEPAK) 4 MG tablet therapy pack Take per package instructions. As needed for flare up (Patient not taking: Reported on 3/22/2023) 21 tablet 3    [DISCONTINUED] budesonide (PULMICORT) 1 MG/2ML neb solution Take 1 mg by nebulization daily       [DISCONTINUED] cetirizine (ZYRTEC) 10 MG tablet Take 10 mg by mouth daily  (Patient not taking: Reported on 9/23/2022)      [DISCONTINUED] clindamycin phosphate (EVOCLIN) 1 % external foam as needed      [DISCONTINUED] diclofenac (VOLTAREN) 1 % topical gel Apply 2 g topically 4 times daily as needed for moderate pain To use over R shoulder (Patient not taking: Reported on 9/23/2022) 100 g 1    [DISCONTINUED] tretinoin (RETIN-A) 0.1 % external cream Apply topically as needed  (Patient not taking: Reported on 9/23/2022)       No facility-administered encounter medications on file as of 5/15/2024.       ROS:  A comprehensive ROS was done, positives are per HPI.    Ph.E:    /75   Pulse 63   Wt 88.4 kg (194 lb 12.8 oz)   SpO2 96%   BMI 29.62 kg/m      Constitutional: WD/WN. Pleasant. In no acute distress. Sounds congested  HEENT: EOM intact, sclera anicteric, conj not injected. No saddle nose deformity  Neck: no cervical LAP  Chest: CTAB  CV: no M/R/G, RRR  Abdomen: soft, NT  MS: No synovitis or joint tenderness  Skin: No skin rash  Neuro: A&O x 3  Psych: NL affect     His records were reviewed.    4/2019: pos MPO, p-ANCA    7/2019: NL ESR/CRP    Component      Latest Ref Rng & Units 3/25/2020   WBC      4.0 - 11.0 10e9/L 3.8 (L)   RBC Count      4.4 - 5.9 10e12/L 4.53   Hemoglobin      13.3 - 17.7 g/dL 15.1   Hematocrit      40.0 - 53.0 % 45.3   MCV      78 - 100 fl 100   MCH      26.5 - 33.0 pg 33.3 (H)   MCHC      31.5 - 36.5 g/dL 33.3   RDW      10.0 - 15.0 % 13.0   Platelet Count      150 - 450  10e9/L 235   Diff Method       Automated Method   % Neutrophils      % 43.3   % Lymphocytes      % 41.0   % Monocytes      % 9.0   % Eosinophils      % 4.8   % Basophils      % 1.6   % Immature Granulocytes      % 0.3   Nucleated RBCs      0 /100 0   Absolute Neutrophil      1.6 - 8.3 10e9/L 1.6   Absolute Lymphocytes      0.8 - 5.3 10e9/L 1.5   Absolute Monocytes      0.0 - 1.3 10e9/L 0.3   Absolute Eosinophils      0.0 - 0.7 10e9/L 0.2   Absolute Basophils      0.0 - 0.2 10e9/L 0.1   Abs Immature Granulocytes      0 - 0.4 10e9/L 0.0   Absolute Nucleated RBC       0.0   Sodium      133 - 144 mmol/L 140   Potassium      3.4 - 5.3 mmol/L 3.6   Chloride      94 - 109 mmol/L 106   Carbon Dioxide      20 - 32 mmol/L 32   Anion Gap      3 - 14 mmol/L 2 (L)   Glucose      70 - 99 mg/dL 53 (L)   Urea Nitrogen      7 - 30 mg/dL 22   Creatinine      0.66 - 1.25 mg/dL 0.98   GFR Estimate      >60 mL/min/1.73:m2 >90   GFR Estimate If Black      >60 mL/min/1.73:m2 >90   Calcium      8.5 - 10.1 mg/dL 9.1   Bilirubin Total      0.2 - 1.3 mg/dL 0.6   Albumin      3.4 - 5.0 g/dL 4.4   Protein Total      6.8 - 8.8 g/dL 7.5   Alkaline Phosphatase      40 - 150 U/L 52   ALT      0 - 70 U/L 45   AST      0 - 45 U/L 28   Color Urine       Yellow   Appearance Urine       Clear   Glucose Urine      NEG:Negative mg/dL Negative   Bilirubin Urine      NEG:Negative Negative   Ketones Urine      NEG:Negative mg/dL Negative   Specific Gravity Urine      1.003 - 1.035 1.006   Blood Urine      NEG:Negative Negative   pH Urine      5.0 - 7.0 pH 7.0   Protein Albumin Urine      NEG:Negative mg/dL Negative   Urobilinogen mg/dL      0.0 - 2.0 mg/dL 0.0   Nitrite Urine      NEG:Negative Negative   Leukocyte Esterase Urine      NEG:Negative Negative   Source       Midstream Urine   WBC Urine      0 - 5 /HPF 0   RBC Urine      0 - 2 /HPF 0   Squamous Epithelial /HPF Urine      0 - 1 /HPF <1   Neutrophil Cytoplasmic Antibody      <1:10 titer <1:10    Neutrophil Cytoplasmic Antibody Pattern       The ANCA IFA is <1:10.  No further testing will be performed.   Protein Random Urine      g/L <0.05   Protein Total Urine g/gr Creatinine      0 - 0.2 g/g Cr Unable to calculate due to low value   Myeloperoxidase Antibody IgG      0.0 - 0.9 AI 4.7 (H)   Proteinase 3 Antibody IgG      0.0 - 0.9 AI <0.2   IGE      0 - 114 KIU/L 22   CRP Inflammation      0.0 - 8.0 mg/L <2.9   Sed Rate      0 - 15 mm/h 5   Creatinine Urine      mg/dL 24     Stable labs in 2/2021    Component      Latest Ref Rng & Units 11/16/2021   Color Urine      Colorless, Straw, Light Yellow, Yellow Yellow   Appearance Urine      Clear Clear   Glucose Urine      Negative mg/dL Negative   Bilirubin Urine      Negative Negative   Ketones Urine      Negative mg/dL Negative   Specific Gravity Urine      1.003 - 1.035 1.009   Blood Urine      Negative Negative   pH Urine      5.0 - 7.0 6.0   Protein Albumin Urine      Negative mg/dL Negative   Urobilinogen mg/dL      Normal, 2.0 mg/dL Normal   Nitrite Urine      Negative Negative   Leukocyte Esterase Urine      Negative Negative   RBC Urine      <=2 /HPF <1   WBC Urine      <=5 /HPF 0   MPO Zenaida IgG Instrument Value      <3.5 U/mL 17.0 (H)   Myeloperoxidase Antibody IgG      Negative Positive (A)   Proteinase 3 Zenaida IgG Instrument Value      <2.0 U/mL <1.0   Proteinase 3 Antibody IgG      Negative Negative   Protein Random Urine      g/L <0.05   Protein Total Urine g/gr Creatinine          Creatinine Urine      mg/dL 37   IGG      610-1,616 mg/dL 1,053   IGA      84 - 499 mg/dL 178   IGM      35 - 242 mg/dL 28 (L)   Creatinine      0.66 - 1.25 mg/dL 0.92   GFR Estimate      >60 mL/min/1.73m2 >90   CD19 B Cells      6 - 27 % 7   Absolute CD19      107 - 698 cells/uL 92 (L)   Neutrophil Cytoplasmic Antibody      <1:10 <1:10   Neutrophil Cytoplasmic Antibody Pattern       The ANCA IFA is <1:10.  No further testing will be performed.   AST      0 - 45 U/L 34    ALT      0 - 70 U/L 46   Albumin      3.4 - 5.0 g/dL 3.8   CRP Inflammation      0.0 - 8.0 mg/L <2.9   Sed Rate      0 - 15 mm/hr 10     Component      Latest Ref Rng & Units 2/1/2022   Color Urine      Colorless, Straw, Light Yellow, Yellow Yellow   Appearance Urine      Clear Clear   Glucose Urine      Negative mg/dL Negative   Bilirubin Urine      Negative Negative   Ketones Urine      Negative mg/dL Negative   Specific Gravity Urine      1.003 - 1.035 1.004   Blood Urine      Negative Negative   pH Urine      5.0 - 7.0 6.0   Protein Albumin Urine      Negative mg/dL Negative   Urobilinogen mg/dL      Normal, 2.0 mg/dL Normal   Nitrite Urine      Negative Negative   Leukocyte Esterase Urine      Negative Negative   RBC Urine      <=2 /HPF <1   WBC Urine      <=5 /HPF <1   MPO Zenaida IgG Instrument Value      <3.5 U/mL    Myeloperoxidase Antibody IgG      Negative    Proteinase 3 Zenaida IgG Instrument Value      <2.0 U/mL    Proteinase 3 Antibody IgG      Negative    Protein Random Urine      g/L <0.05   Protein Total Urine g/gr Creatinine          Creatinine Urine      mg/dL 24   IGG      610-1,616 mg/dL 1,146   IGA      84 - 499 mg/dL 168   IGM      35 - 242 mg/dL 30 (L)   Creatinine      0.66 - 1.25 mg/dL 1.06   GFR Estimate      >60 mL/min/1.73m2 89   CD19 B Cells      6 - 27 % 7   Absolute CD19      107 - 698 cells/uL 116   Neutrophil Cytoplasmic Antibody      <1:10 1:160 (H)   Neutrophil Cytoplasmic Antibody Pattern       Perinuclear ANCA (p-ANCA) staining pattern observed and confirmed on formalin-fixed neutrophils.   AST      0 - 45 U/L 38   ALT      0 - 70 U/L 40   Albumin      3.4 - 5.0 g/dL 3.7   CRP Inflammation      0.0 - 8.0 mg/L 7.4   Sed Rate      0 - 15 mm/hr 13     Component      Latest Ref Rng & Units 2/1/2022   WBC      4.0 - 11.0 10e3/uL 5.5   RBC Count      4.40 - 5.90 10e6/uL 4.39 (L)   Hemoglobin      13.3 - 17.7 g/dL 14.9   Hematocrit      40.0 - 53.0 % 42.8   MCV      78 - 100 fL 98   MCH       26.5 - 33.0 pg 33.9 (H)   MCHC      31.5 - 36.5 g/dL 34.8   RDW      10.0 - 15.0 % 12.2   Platelet Count      150 - 450 10e3/uL 257   % Neutrophils      % 54   % Lymphocytes      % 25   % Monocytes      % 10   % Eosinophils      % 9   % Basophils      % 2   % Immature Granulocytes      % 0   NRBCs per 100 WBC      <1 /100 0   Absolute Neutrophils      1.6 - 8.3 10e3/uL 3.0   Absolute Lymphocytes      0.8 - 5.3 10e3/uL 1.3   Absolute Monocytes      0.0 - 1.3 10e3/uL 0.6   Absolute Eosinophils      0.0 - 0.7 10e3/uL 0.5   Absolute Basophils      0.0 - 0.2 10e3/uL 0.1   Absolute Immature Granulocytes      <=0.4 10e3/uL 0.0   Absolute NRBCs      10e3/uL 0.0   Color Urine      Colorless, Straw, Light Yellow, Yellow Yellow   Appearance Urine      Clear Clear   Glucose Urine      Negative mg/dL Negative   Bilirubin Urine      Negative Negative   Ketones Urine      Negative mg/dL Negative   Specific Gravity Urine      1.003 - 1.035 1.004   Blood Urine      Negative Negative   pH Urine      5.0 - 7.0 6.0   Protein Albumin Urine      Negative mg/dL Negative   Urobilinogen mg/dL      Normal, 2.0 mg/dL Normal   Nitrite Urine      Negative Negative   Leukocyte Esterase Urine      Negative Negative   RBC Urine      <=2 /HPF <1   WBC Urine      <=5 /HPF <1   MPO Zenaida IgG Instrument Value      <3.5 U/mL 102.0 (H)   Myeloperoxidase Antibody IgG      Negative Positive (A)   Proteinase 3 Zenaida IgG Instrument Value      <2.0 U/mL <1.0   Proteinase 3 Antibody IgG      Negative Negative   IGG      610-1,616 mg/dL 1,146   IGA      84 - 499 mg/dL 168   IGM      35 - 242 mg/dL 30 (L)   Protein Random Urine      g/L <0.05   Protein Total Urine g/gr Creatinine          Creatinine Urine      mg/dL 24   Neutrophil Cytoplasmic Antibody      <1:10 1:160 (H)   Neutrophil Cytoplasmic Antibody Pattern       Perinuclear ANCA (p-ANCA) staining pattern observed and confirmed on formalin-fixed neutrophils.   CD19 B Cells      6 - 27 % 7   Absolute CD19       107 - 698 cells/uL 116   Creatinine      0.66 - 1.25 mg/dL 1.06   GFR Estimate      >60 mL/min/1.73m2 89   Sed Rate      0 - 15 mm/hr 13   CRP Inflammation      0.0 - 8.0 mg/L 7.4   Albumin      3.4 - 5.0 g/dL 3.7   ALT      0 - 70 U/L 40   AST      0 - 45 U/L 38     Component      Latest Ref Rng & Units 9/12/2022   WBC      4.0 - 11.0 10e3/uL 5.0   RBC Count      4.40 - 5.90 10e6/uL 4.50   Hemoglobin      13.3 - 17.7 g/dL 15.0   Hematocrit      40.0 - 53.0 % 43.5   MCV      78 - 100 fL 97   MCH      26.5 - 33.0 pg 33.3 (H)   MCHC      31.5 - 36.5 g/dL 34.5   RDW      10.0 - 15.0 % 12.5   Platelet Count      150 - 450 10e3/uL 221   % Neutrophils      % 49   % Lymphocytes      % 36   % Monocytes      % 12   % Eosinophils      % 1   % Basophils      % 1   % Immature Granulocytes      % 1   NRBCs per 100 WBC      <1 /100 0   Absolute Neutrophils      1.6 - 8.3 10e3/uL 2.4   Absolute Lymphocytes      0.8 - 5.3 10e3/uL 1.8   Absolute Monocytes      0.0 - 1.3 10e3/uL 0.6   Absolute Eosinophils      0.0 - 0.7 10e3/uL 0.0   Absolute Basophils      0.0 - 0.2 10e3/uL 0.1   Absolute Immature Granulocytes      <=0.4 10e3/uL 0.0   Absolute NRBCs      10e3/uL 0.0   Color Urine      Colorless, Straw, Light Yellow, Yellow Yellow   Appearance Urine      Clear Clear   Glucose Urine      Negative mg/dL Negative   Bilirubin Urine      Negative Negative   Ketones Urine      Negative mg/dL Negative   Specific Gravity Urine      1.003 - 1.035 1.005   Blood Urine      Negative Negative   pH Urine      5.0 - 7.0 5.0   Protein Albumin Urine      Negative mg/dL Negative   Urobilinogen mg/dL      Normal, 2.0 mg/dL Normal   Nitrite Urine      Negative Negative   Leukocyte Esterase Urine      Negative Negative   RBC Urine      <=2 /HPF 0   WBC Urine      <=5 /HPF 0   MPO Zenaida IgG Instrument Value      <3.5 U/mL 8.0 (H)   Myeloperoxidase Antibody IgG      Negative Positive (A)   Proteinase 3 Zenaida IgG Instrument Value      <2.0 U/mL <1.0    Proteinase 3 Antibody IgG      Negative Negative   Total Protein Urine mg/dL      mg/dL <6.0   Total Protein UR MG/MG CR          Creatinine Urine      mg/dL 20.3   IGG      610 - 1,616 mg/dL 906   IGA      84 - 499 mg/dL 160   IGM      35 - 242 mg/dL 32 (L)   Creatinine      0.67 - 1.17 mg/dL 1.06   GFR Estimate      >60 mL/min/1.73m2 89   CD19 B Cells      6 - 27 % 8   Absolute CD19      107 - 698 cells/uL 151   Neutrophil Cytoplasmic Antibody      <1:10 <1:10   Neutrophil Cytoplasmic Antibody Pattern       The ANCA IFA is <1:10.  No further testing will be performed.   AST      10 - 50 U/L 36   ALT      10 - 50 U/L 35   Albumin      3.5 - 5.2 g/dL 4.5   CRP Inflammation      <5.00 mg/L <3.00   Sed Rate      0 - 15 mm/hr 6     Component      Latest Ref Rng & Units 11/1/2022   WBC      4.0 - 11.0 10e3/uL 6.4   RBC Count      4.40 - 5.90 10e6/uL 4.44   Hemoglobin      13.3 - 17.7 g/dL 14.8   Hematocrit      40.0 - 53.0 % 42.7   MCV      78 - 100 fL 96   MCH      26.5 - 33.0 pg 33.3 (H)   MCHC      31.5 - 36.5 g/dL 34.7   RDW      10.0 - 15.0 % 12.5   Platelet Count      150 - 450 10e3/uL 247   % Neutrophils      % 62   % Lymphocytes      % 26   % Monocytes      % 9   % Eosinophils      % 2   % Basophils      % 1   % Immature Granulocytes      % 0   NRBCs per 100 WBC      <1 /100 0   Absolute Neutrophils      1.6 - 8.3 10e3/uL 4.0   Absolute Lymphocytes      0.8 - 5.3 10e3/uL 1.7   Absolute Monocytes      0.0 - 1.3 10e3/uL 0.6   Absolute Eosinophils      0.0 - 0.7 10e3/uL 0.1   Absolute Basophils      0.0 - 0.2 10e3/uL 0.1   Absolute Immature Granulocytes      <=0.4 10e3/uL 0.0   Absolute NRBCs      10e3/uL 0.0   Color Urine      Colorless, Straw, Light Yellow, Yellow Straw   Appearance Urine      Clear Clear   Glucose Urine      Negative mg/dL Negative   Bilirubin Urine      Negative Negative   Ketones Urine      Negative mg/dL Negative   Specific Gravity Urine      1.003 - 1.035 1.010   Blood Urine      Negative  Negative   pH Urine      5.0 - 7.0 6.0   Protein Albumin Urine      Negative mg/dL Negative   Urobilinogen mg/dL      Normal, 2.0 mg/dL Normal   Nitrite Urine      Negative Negative   Leukocyte Esterase Urine      Negative Negative   RBC Urine      <=2 /HPF 0   WBC Urine      <=5 /HPF <1   MPO Zenaida IgG Instrument Value      <3.5 U/mL 1.5   Myeloperoxidase Antibody IgG      Negative Negative   Proteinase 3 Zenaida IgG Instrument Value      <2.0 U/mL <1.0   Proteinase 3 Antibody IgG      Negative Negative   Total Protein Urine mg/dL      mg/dL <6.0   Total Protein UR MG/MG CR          Creatinine Urine      mg/dL 36.7   IGG      610 - 1,616 mg/dL 948   IGA      84 - 499 mg/dL 154   IGM      35 - 242 mg/dL 27 (L)   Creatinine      0.67 - 1.17 mg/dL 0.97   GFR Estimate      >60 mL/min/1.73m2 >90   CD19 B Cells      6 - 27 % 13   Absolute CD19      107 - 698 cells/uL 240   Neutrophil Cytoplasmic Antibody      <1:10 <1:10   Neutrophil Cytoplasmic Antibody Pattern       The ANCA IFA is <1:10.  No further testing will be performed.   AST      10 - 50 U/L 44   ALT      10 - 50 U/L 37   Albumin      3.5 - 5.2 g/dL 4.6   CRP Inflammation      <5.00 mg/L <3.00   Sed Rate      0 - 15 mm/hr 6     Component      Latest Ref [facility] 6/6/2023  11:57 AM   WBC      4.0 - 11.0 10e3/uL 6.3    RBC Count      4.40 - 5.90 10e6/uL 4.22 (L)    Hemoglobin      13.3 - 17.7 g/dL 14.0    Hematocrit      40.0 - 53.0 % 40.6    MCV      78 - 100 fL 96    MCH      26.5 - 33.0 pg 33.2 (H)    MCHC      31.5 - 36.5 g/dL 34.5    RDW      10.0 - 15.0 % 12.3    Platelet Count      150 - 450 10e3/uL 325    % Neutrophils      % 58    % Lymphocytes      % 26    % Monocytes      % 8    % Eosinophils      % 6    % Basophils      % 2    % Immature Granulocytes      % 0    NRBCs per 100 WBC      <1 /100 0    Absolute Neutrophils      1.6 - 8.3 10e3/uL 3.7    Absolute Lymphocytes      0.8 - 5.3 10e3/uL 1.7    Absolute Monocytes      0.0 - 1.3 10e3/uL 0.5    Absolute  Eosinophils      0.0 - 0.7 10e3/uL 0.4    Absolute Basophils      0.0 - 0.2 10e3/uL 0.1    Absolute Immature Granulocytes      <=0.4 10e3/uL 0.0    Absolute NRBCs      10e3/uL 0.0    Color Urine      Colorless, Straw, Light Yellow, Yellow     Appearance Urine      Clear     Glucose Urine      Negative mg/dL    Bilirubin Urine      Negative     Ketones Urine      Negative mg/dL    Specific Gravity Urine      1.003 - 1.035     Blood Urine      Negative     pH Urine      5.0 - 7.0     Protein Albumin Urine      Negative mg/dL    Urobilinogen mg/dL      Normal, 2.0 mg/dL    Nitrite Urine      Negative     Leukocyte Esterase Urine      Negative     RBC Urine      <=2 /HPF    WBC Urine      <=5 /HPF    MPO Zenaida IgG Instrument Value      <3.5 U/mL 12.0 (H)    Myeloperoxidase Antibody IgG      Negative  Positive !    Proteinase 3 Zenaida IgG Instrument Value      <2.0 U/mL <1.0    Proteinase 3 Antibody IgG      Negative  Negative    Total Protein Urine mg/dL      1.0 - 14.0 mg/dL    Total Protein UR MG/MG CR    Creatinine Urine      mg/dL    CD19 B Cells      6 - 27 % 11    Absolute CD19      107 - 698 cells/uL 182    IGG      610 - 1,616 mg/dL 1,204    IGA      84 - 499 mg/dL 183    IGM      35 - 242 mg/dL 49    Creatinine      0.67 - 1.17 mg/dL 0.89    GFR Estimate      >60 mL/min/1.73m2 >90    Neutrophil Cytoplasmic Antibody      <1:10  1:10 (H)    Neutrophil Cytoplasmic Antibody Pattern Cytoplasmic ANCA (c-ANCA) staining pattern observed and confirmed on formalin-fixed neutrophils.    AST      10 - 50 U/L 41    ALT      10 - 50 U/L 47    Albumin      3.5 - 5.2 g/dL 4.3    CRP Inflammation      <5.00 mg/L 4.24    Sed Rate      0 - 15 mm/hr 18 (H)      Component      Latest Ref [facility] 6/6/2023  12:00 PM   WBC      4.0 - 11.0 10e3/uL    RBC Count      4.40 - 5.90 10e6/uL    Hemoglobin      13.3 - 17.7 g/dL    Hematocrit      40.0 - 53.0 %    MCV      78 - 100 fL    MCH      26.5 - 33.0 pg    MCHC      31.5 - 36.5 g/dL    RDW       10.0 - 15.0 %    Platelet Count      150 - 450 10e3/uL    % Neutrophils      %    % Lymphocytes      %    % Monocytes      %    % Eosinophils      %    % Basophils      %    % Immature Granulocytes      %    NRBCs per 100 WBC      <1 /100    Absolute Neutrophils      1.6 - 8.3 10e3/uL    Absolute Lymphocytes      0.8 - 5.3 10e3/uL    Absolute Monocytes      0.0 - 1.3 10e3/uL    Absolute Eosinophils      0.0 - 0.7 10e3/uL    Absolute Basophils      0.0 - 0.2 10e3/uL    Absolute Immature Granulocytes      <=0.4 10e3/uL    Absolute NRBCs      10e3/uL    Color Urine      Colorless, Straw, Light Yellow, Yellow  Straw    Appearance Urine      Clear  Clear    Glucose Urine      Negative mg/dL Negative    Bilirubin Urine      Negative  Negative    Ketones Urine      Negative mg/dL Negative    Specific Gravity Urine      1.003 - 1.035  1.009    Blood Urine      Negative  Negative    pH Urine      5.0 - 7.0  5.5    Protein Albumin Urine      Negative mg/dL Negative    Urobilinogen mg/dL      Normal, 2.0 mg/dL Normal    Nitrite Urine      Negative  Negative    Leukocyte Esterase Urine      Negative  Negative    RBC Urine      <=2 /HPF <1    WBC Urine      <=5 /HPF <1    MPO Zenaida IgG Instrument Value      <3.5 U/mL    Myeloperoxidase Antibody IgG      Negative     Proteinase 3 Zenaida IgG Instrument Value      <2.0 U/mL    Proteinase 3 Antibody IgG      Negative     Total Protein Urine mg/dL      1.0 - 14.0 mg/dL <6.0    Total Protein UR MG/MG CR --    Creatinine Urine      mg/dL 27.9    CD19 B Cells      6 - 27 %    Absolute CD19      107 - 698 cells/uL    IGG      610 - 1,616 mg/dL    IGA      84 - 499 mg/dL    IGM      35 - 242 mg/dL    Creatinine      0.67 - 1.17 mg/dL    GFR Estimate      >60 mL/min/1.73m2    Neutrophil Cytoplasmic Antibody      <1:10     Neutrophil Cytoplasmic Antibody Pattern    AST      10 - 50 U/L    ALT      10 - 50 U/L    Albumin      3.5 - 5.2 g/dL    CRP Inflammation      <5.00 mg/L    Sed Rate      0 -  15 mm/hr       Legend:  (L) Low  (H) High  ! Abnormal  Component      Latest Ref Rng 10/11/2023  11:32 AM   MPO Zenaida IgG Instrument Value      <3.5 U/mL 12.0 (H)    Myeloperoxidase Antibody IgG      Negative  Positive !    Proteinase 3 Zenaida IgG Instrument Value      <2.0 U/mL <1.0    Proteinase 3 Antibody IgG      Negative  Negative    Neutrophil Cytoplasmic Antibody      <1:10  <1:10    Neutrophil Cytoplasmic Antibody Pattern The ANCA IFA is <1:10. No further testing will be performed.       Legend:  (H) High  ! Abnormal      Isabella Manley MD

## 2024-05-15 NOTE — PROGRESS NOTES
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 He has previously failed methotrexate, azathioprine, Nucala and, most recently, MMF due to recurrent sinusitis and respiratory symptoms.     11/2022: Specifically, methotrexate 8 tabs weekly was associated with recurrent URI with no improvement in symptoms on 6 tabs weekly. He also then transitioned to AZA in 8/2019 (NL TPMT) but was put on hold in 11/2019 given recurrent sinusitis and antibiotic tx, ultimately requiring sinus surgery/polypectomy on 12/10/2019. He retried AZA 50 mg bid on 3/2020 but had a transient elevated LFTs and improvement of MPO even before resuming AZA. He subsequently trialed Nucala on 2/2022 but discontinued on 9/2022 due to persistent sinusitis and symptoms flares. 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    At this time, the patient is still completing his antibiotic course for Moraxella sinusitis and would prefer to hold off on further immunosuppression. He does feel that his symptoms are controlled at present on antibiotics and Medrol dose pack. We discussed risks and benefits of not restarting immunosuppression, and he was agreeable to considering a retrial of MMF pending further discussion at follow-up in 3 months. Exam today was otherwise unremarkable, and labs were notable for unremarkable inflammatory markers and ANCA.     3/22/2023: Resumed  mg bid recently, was on it for about 3 wk, but started having sinus sx 2 wk ago, stopped MMF (cellcept), took medrol  dose pack, no antibiotic needed this time. Stable labs from 11/2022 with neg ANCA and normal ESR/CRP, no asthma flares and reportedly almost no recurrence of nasal polyps. Feeling well today. Will check labs this month and monitor off MMF for flares. If he continues to flare up with sinus sx, will consider a trial of rituximab. Risks were discussed.       7/19/2023: stable with stable labs (except slight elevation of vasculitis markers), will monitor off tx. Recent labs were reviewed and discussed.    Today's Plan:    Continue follow-up with ENT    Labs (just ANCA, MPO) in 9/2023 and every 3 months      Return in 6 months (video), could be re-scheduled if no complaints    2/15/2024:    Stable ANCA vasculitis off Tx, stable labs. Will continue to monitor sx off tx.    Plan:    Labs at [facility], please fax orders    Return video visit in 6 months      Today 5/15/2024:    EGPA flare responding to high dose steroid taper, I called and spoke with Mj's pulmonologist and we agreed on trying Fasenra next. Lupillo was present during discussion and agreed with the plan. He has tried and failed AZA, MMF, MTX and nucala.    Today 5/15/2024 plan:    Benralizumab (Fasenra) will be ordered by pulmonology, if fails Dupixent will be next    Labs in 2 weeks (will do it after being off steroid)    Keep Aug appointment        Isabella Manley MD    HPI:    8/20/2021: On AZA 50 mg bid since end of 3/2020. Tolerates it well. Reports no recurrence of nasal polyps. He will do follow up with ENT. Labs from 2/2021 are stable. His EGPA seems to be stable. Reports seasonal allergies at this time of the year, using inhalor. Nasal congestion got better by missing AZA x 3 days.    We discussed covid booster vaccine, no official recommendation for J&J receivers yet but most likely will need a booster being immunocompromised.    2/4/2022: Flaring with EGPA, sinus inflammation, increased vasculitis markers. On and off AZA due to sinus infections.  Recommend to switch to nucala to control EGPA; risks were discussed. Will treat with prednisone taper.    8/18/2022: Unclear if sinus sx are due to vasculitis and if he fails nucala or not, will get ENT opinion and check labs.    9/23/2022: Labs are stable, but clinically he continues to flare with sinusitis, nasal polyps, steroid responsive. Since he failed nucala, recommend to stop. Discussed next tx options cellcept versus rituximab. Agreed to try cellcept as safer option next; risks were discussed.    11/30/2022:    Patient reports that he discontinued MMF 2 weeks ago due to worsening congestion, post nasal drip, cough and shortness of breath with subsequent ENT evaluation revealing nasal culture positive sinus infection on 11/11/22. He was started on an unspecified antibiotic on 11/16 and began PRN Medrol dose pack provided by Rheumatology staff with immediate improvement in symptoms. He now continues to have aforementioned symptoms but reduced in severity.    No other acute complaints when seen today. Patient overall reports feeling that symptoms are controlled on antibiotics and steroids and would prefer to wait on restarting MMF or other immunosuppressive regimen at this time.       3/22/2023:     Tried MMF again for about 3wk [place], got recurrence of sinus sx, tried medrol dose pack and stopped cellcept, this was 2 wk ago, medrol helped.    Asthma is well controlled on Symbicort inh only, has not required rescue inhaler.     No new sx of rash, neuropathy, joint pain.    Feeling well today.    7/19/2023:    -Sometimes sinuses feels plugged on especially with moaning the lawn, uses Symbicort once-twice a day, uses rescue inhaler less often.    -No more steroid use since last visit    -No recent infection needing antibiotic        History of Presenting Illness    2/4/2022: Had 2 sinusitis, went on 2 rounds of antibiotics. Had 3 different bacteria on the culture. Off antibiotic  x 2 weeks, resumed AZA x past 2 weeks. Still gets PND and coughs a lot. ENT saw inflammation on exam with no infection, this was about 2.5 wk ago when he was recommended to resume AZA. Was off AZA from 9/2021 till two weeks ago.  1st and 2nd round of antibiotic, did not come off AZA, just with 3rd round. Has some SOB, uses inhalers more. Gets winded more with exercise.     8/19/2022: His nose gets stuffy on lying down. Saw ENT and was given prednisone and it helped some, but cough and congestion is coming back off prednisone. Took last prednisone last week. It was 40 mg every day max. Seasonal allergies are triggers. Sometimes it is harder to breathe when he is working out.     9/23/2022: Lucio presents for follow up, continues to have flares of sinusitis with nasal polyps despite being on nucala, which are steroid responsive.    Mj Isabella Brown. 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    He is doing fairly well. No recurrence of nasal polyps on imuran. Last seen by ENT in 12/2020, has upcoming appointment. Reports having seasonal allergies at this time of the year, has been using his inhalers x past 2 weeks. Reports some nasal congestion, it was better when he ran out of AZA x 3 days early this week.    He sleeps at the side, has block nostril, was better on medrol dose pack.    Medrol dose pack finished yesterday. Last antibiotic was a month ago. Medrol works better, prednisone does not help.    Uses inhaler to prevent asthma.    No joint pain or skin rash.    Had surgery for R shoulder rotator cuff full tear which was successful.    Had J&J covid vaccine on 4/8/2021, tolerated it well.        2/15/2024:      Was feeling stuffy due to allergies around Thanksgiving; otherwise no flares of vasculitis, no nasal polyps, no infections      About 1.5 months ago, started having more coughing with more mucous, harder to breath wit hwork out, then started using inhlaer, same as petra Dietrich  gfdifferent pollens    Went and had polyps checked, minimal nasal polyps but they have been stable, not as bad as years ago,     Always gets congested    Today 5/15/2024:    Presents with EGPA flare, managed by pulmonary    Prednisone 60 mg every day-40-20 mg a day, 1 wk after steroid, cough resolved and went away    Cough is coming back little bit now     Will be down on prednisone 10 mg qd tomorrow    Sense of smell is back    No skin rash, joint pain, foot or wrist drop, numbness/tingling    Does work out    Family History   Problem Relation Age of Onset    Colon Cancer Maternal Grandmother     Stomach Cancer Paternal Grandmother     No Known Problems Mother     No Known Problems Father     No Known Problems Maternal Grandfather     No Known Problems Paternal Grandfather     No Known Problems Brother     No Known Problems Sister     No Known Problems Son     No Known Problems Daughter     No Known Problems Maternal Half-Brother     No Known Problems Maternal Half-Sister     No Known Problems Paternal Half-Brother     No Known Problems Paternal Half-Sister     No Known Problems Niece     No Known Problems Nephew     No Known Problems Cousin     No Known Problems Other     Cancer No family hx of     Diabetes No family hx of     Heart Disease No family hx of     Hypertension No family hx of     Cerebrovascular Disease No family hx of     Asthma No family hx of     Thyroid Disease No family hx of     Depression No family hx of     Mental Illness No family hx of     Substance Abuse No family hx of     Dementia No family hx of     Bleeding Disorder No family hx of     Congenital Anomalies No family hx of     Migraines No family hx of     Stomach Problem No family hx of     Muscular Disorder No family hx of     Osteoporosis No family hx of     Unknown/Adopted No family hx of      Social History     Socioeconomic History    Marital status:      Spouse name: Not on file    Number of children: 2    Years of  education: Not on file    Highest education level: Not on file   Occupational History    Occupation:  manager   Tobacco Use    Smoking status: Never     Passive exposure: Past    Smokeless tobacco: Never   Substance and Sexual Activity    Alcohol use: Yes    Drug use: Never    Sexual activity: Not on file   Other Topics Concern    Not on file   Social History Narrative    Not on file     Social Determinants of Health     Financial Resource Strain: High Risk (12/22/2021)    Received from [facility], [facility]    Financial Resource Strain     Difficulty of Paying Living Expenses: Not on file     Difficulty of Paying Living Expenses: Not on file   Food Insecurity: Not on file   Transportation Needs: Not on file   Physical Activity: Not on file   Stress: Not on file   Social Connections: Unknown (12/22/2021)    Received from [facility], [facility]    Social Connections     Frequency of Communication with Friends and Family: Not on file   Interpersonal Safety: Not on file   Housing Stability: Not on file       No Known Allergies    Outpatient Encounter Medications as of 5/15/2024   Medication Sig Dispense Refill    albuterol (PROAIR HFA/PROVENTIL HFA/VENTOLIN HFA) 108 (90 Base) MCG/ACT inhaler as needed       budesonide (PULMICORT) 0.5 MG/2ML neb solution       FLOVENT  MCG/ACT inhaler Inhale 1 puff into the lungs daily      montelukast (SINGULAIR) 10 MG tablet Take 10 mg by mouth At Bedtime       SYMBICORT 160-4.5 MCG/ACT Inhaler Inhale 2 puffs into the lungs 2 times daily       levocetirizine (XYZAL) 5 MG tablet Take 5 mg by mouth every morning (Patient not taking: Reported on 3/22/2023)      methylPREDNISolone (MEDROL DOSEPAK) 4 MG tablet therapy pack Take per package instructions. As needed for flare up (Patient not taking: Reported on 3/22/2023) 21 tablet 3     [DISCONTINUED] budesonide (PULMICORT) 1 MG/2ML neb solution Take 1 mg by nebulization daily       [DISCONTINUED] cetirizine (ZYRTEC) 10 MG tablet Take 10 mg by mouth daily  (Patient not taking: Reported on 9/23/2022)      [DISCONTINUED] clindamycin phosphate (EVOCLIN) 1 % external foam as needed      [DISCONTINUED] diclofenac (VOLTAREN) 1 % topical gel Apply 2 g topically 4 times daily as needed for moderate pain To use over R shoulder (Patient not taking: Reported on 9/23/2022) 100 g 1    [DISCONTINUED] tretinoin (RETIN-A) 0.1 % external cream Apply topically as needed  (Patient not taking: Reported on 9/23/2022)       No facility-administered encounter medications on file as of 5/15/2024.       ROS:  A comprehensive ROS was done, positives are per HPI.    Ph.E:    /75   Pulse 63   Wt 88.4 kg (194 lb 12.8 oz)   SpO2 96%   BMI 29.62 kg/m      Constitutional: WD/WN. Pleasant. In no acute distress. Sounds congested  HEENT: EOM intact, sclera anicteric, conj not injected. No saddle nose deformity  Neck: no cervical LAP  Chest: CTAB  CV: no M/R/G, RRR  Abdomen: soft, NT  MS: No synovitis or joint tenderness  Skin: No skin rash  Neuro: A&O x 3  Psych: NL affect     His records were reviewed.    4/2019: pos MPO, p-ANCA    7/2019: NL ESR/CRP    Component      Latest Ref Rng & Units 3/25/2020   WBC      4.0 - 11.0 10e9/L 3.8 (L)   RBC Count      4.4 - 5.9 10e12/L 4.53   Hemoglobin      13.3 - 17.7 g/dL 15.1   Hematocrit      40.0 - 53.0 % 45.3   MCV      78 - 100 fl 100   MCH      26.5 - 33.0 pg 33.3 (H)   MCHC      31.5 - 36.5 g/dL 33.3   RDW      10.0 - 15.0 % 13.0   Platelet Count      150 - 450 10e9/L 235   Diff Method       Automated Method   % Neutrophils      % 43.3   % Lymphocytes      % 41.0   % Monocytes      % 9.0   % Eosinophils      % 4.8   % Basophils      % 1.6   % Immature Granulocytes      % 0.3   Nucleated RBCs      0 /100 0   Absolute Neutrophil      1.6 - 8.3 10e9/L 1.6   Absolute Lymphocytes       0.8 - 5.3 10e9/L 1.5   Absolute Monocytes      0.0 - 1.3 10e9/L 0.3   Absolute Eosinophils      0.0 - 0.7 10e9/L 0.2   Absolute Basophils      0.0 - 0.2 10e9/L 0.1   Abs Immature Granulocytes      0 - 0.4 10e9/L 0.0   Absolute Nucleated RBC       0.0   Sodium      133 - 144 mmol/L 140   Potassium      3.4 - 5.3 mmol/L 3.6   Chloride      94 - 109 mmol/L 106   Carbon Dioxide      20 - 32 mmol/L 32   Anion Gap      3 - 14 mmol/L 2 (L)   Glucose      70 - 99 mg/dL 53 (L)   Urea Nitrogen      7 - 30 mg/dL 22   Creatinine      0.66 - 1.25 mg/dL 0.98   GFR Estimate      >60 mL/min/1.73:m2 >90   GFR Estimate If Black      >60 mL/min/1.73:m2 >90   Calcium      8.5 - 10.1 mg/dL 9.1   Bilirubin Total      0.2 - 1.3 mg/dL 0.6   Albumin      3.4 - 5.0 g/dL 4.4   Protein Total      6.8 - 8.8 g/dL 7.5   Alkaline Phosphatase      40 - 150 U/L 52   ALT      0 - 70 U/L 45   AST      0 - 45 U/L 28   Color Urine       Yellow   Appearance Urine       Clear   Glucose Urine      NEG:Negative mg/dL Negative   Bilirubin Urine      NEG:Negative Negative   Ketones Urine      NEG:Negative mg/dL Negative   Specific Gravity Urine      1.003 - 1.035 1.006   Blood Urine      NEG:Negative Negative   pH Urine      5.0 - 7.0 pH 7.0   Protein Albumin Urine      NEG:Negative mg/dL Negative   Urobilinogen mg/dL      0.0 - 2.0 mg/dL 0.0   Nitrite Urine      NEG:Negative Negative   Leukocyte Esterase Urine      NEG:Negative Negative   Source       Midstream Urine   WBC Urine      0 - 5 /HPF 0   RBC Urine      0 - 2 /HPF 0   Squamous Epithelial /HPF Urine      0 - 1 /HPF <1   Neutrophil Cytoplasmic Antibody      <1:10 titer <1:10   Neutrophil Cytoplasmic Antibody Pattern       The ANCA IFA is <1:10.  No further testing will be performed.   Protein Random Urine      g/L <0.05   Protein Total Urine g/gr Creatinine      0 - 0.2 g/g Cr Unable to calculate due to low value   Myeloperoxidase Antibody IgG      0.0 - 0.9 AI 4.7 (H)   Proteinase 3 Antibody  IgG      0.0 - 0.9 AI <0.2   IGE      0 - 114 KIU/L 22   CRP Inflammation      0.0 - 8.0 mg/L <2.9   Sed Rate      0 - 15 mm/h 5   Creatinine Urine      mg/dL 24     Stable labs in 2/2021    Component      Latest Ref Rng & Units 11/16/2021   Color Urine      Colorless, Straw, Light Yellow, Yellow Yellow   Appearance Urine      Clear Clear   Glucose Urine      Negative mg/dL Negative   Bilirubin Urine      Negative Negative   Ketones Urine      Negative mg/dL Negative   Specific Gravity Urine      1.003 - 1.035 1.009   Blood Urine      Negative Negative   pH Urine      5.0 - 7.0 6.0   Protein Albumin Urine      Negative mg/dL Negative   Urobilinogen mg/dL      Normal, 2.0 mg/dL Normal   Nitrite Urine      Negative Negative   Leukocyte Esterase Urine      Negative Negative   RBC Urine      <=2 /HPF <1   WBC Urine      <=5 /HPF 0   MPO Zenaida IgG Instrument Value      <3.5 U/mL 17.0 (H)   Myeloperoxidase Antibody IgG      Negative Positive (A)   Proteinase 3 Zenaida IgG Instrument Value      <2.0 U/mL <1.0   Proteinase 3 Antibody IgG      Negative Negative   Protein Random Urine      g/L <0.05   Protein Total Urine g/gr Creatinine          Creatinine Urine      mg/dL 37   IGG      610-1,616 mg/dL 1,053   IGA      84 - 499 mg/dL 178   IGM      35 - 242 mg/dL 28 (L)   Creatinine      0.66 - 1.25 mg/dL 0.92   GFR Estimate      >60 mL/min/1.73m2 >90   CD19 B Cells      6 - 27 % 7   Absolute CD19      107 - 698 cells/uL 92 (L)   Neutrophil Cytoplasmic Antibody      <1:10 <1:10   Neutrophil Cytoplasmic Antibody Pattern       The ANCA IFA is <1:10.  No further testing will be performed.   AST      0 - 45 U/L 34   ALT      0 - 70 U/L 46   Albumin      3.4 - 5.0 g/dL 3.8   CRP Inflammation      0.0 - 8.0 mg/L <2.9   Sed Rate      0 - 15 mm/hr 10     Component      Latest Ref Rng & Units 2/1/2022   Color Urine      Colorless, Straw, Light Yellow, Yellow Yellow   Appearance Urine      Clear Clear   Glucose Urine      Negative mg/dL  Negative   Bilirubin Urine      Negative Negative   Ketones Urine      Negative mg/dL Negative   Specific Gravity Urine      1.003 - 1.035 1.004   Blood Urine      Negative Negative   pH Urine      5.0 - 7.0 6.0   Protein Albumin Urine      Negative mg/dL Negative   Urobilinogen mg/dL      Normal, 2.0 mg/dL Normal   Nitrite Urine      Negative Negative   Leukocyte Esterase Urine      Negative Negative   RBC Urine      <=2 /HPF <1   WBC Urine      <=5 /HPF <1   MPO Zenaida IgG Instrument Value      <3.5 U/mL    Myeloperoxidase Antibody IgG      Negative    Proteinase 3 Zenaida IgG Instrument Value      <2.0 U/mL    Proteinase 3 Antibody IgG      Negative    Protein Random Urine      g/L <0.05   Protein Total Urine g/gr Creatinine          Creatinine Urine      mg/dL 24   IGG      610-1,616 mg/dL 1,146   IGA      84 - 499 mg/dL 168   IGM      35 - 242 mg/dL 30 (L)   Creatinine      0.66 - 1.25 mg/dL 1.06   GFR Estimate      >60 mL/min/1.73m2 89   CD19 B Cells      6 - 27 % 7   Absolute CD19      107 - 698 cells/uL 116   Neutrophil Cytoplasmic Antibody      <1:10 1:160 (H)   Neutrophil Cytoplasmic Antibody Pattern       Perinuclear ANCA (p-ANCA) staining pattern observed and confirmed on formalin-fixed neutrophils.   AST      0 - 45 U/L 38   ALT      0 - 70 U/L 40   Albumin      3.4 - 5.0 g/dL 3.7   CRP Inflammation      0.0 - 8.0 mg/L 7.4   Sed Rate      0 - 15 mm/hr 13     Component      Latest Ref Rng & Units 2/1/2022   WBC      4.0 - 11.0 10e3/uL 5.5   RBC Count      4.40 - 5.90 10e6/uL 4.39 (L)   Hemoglobin      13.3 - 17.7 g/dL 14.9   Hematocrit      40.0 - 53.0 % 42.8   MCV      78 - 100 fL 98   MCH      26.5 - 33.0 pg 33.9 (H)   MCHC      31.5 - 36.5 g/dL 34.8   RDW      10.0 - 15.0 % 12.2   Platelet Count      150 - 450 10e3/uL 257   % Neutrophils      % 54   % Lymphocytes      % 25   % Monocytes      % 10   % Eosinophils      % 9   % Basophils      % 2   % Immature Granulocytes      % 0   NRBCs per 100 WBC      <1  /100 0   Absolute Neutrophils      1.6 - 8.3 10e3/uL 3.0   Absolute Lymphocytes      0.8 - 5.3 10e3/uL 1.3   Absolute Monocytes      0.0 - 1.3 10e3/uL 0.6   Absolute Eosinophils      0.0 - 0.7 10e3/uL 0.5   Absolute Basophils      0.0 - 0.2 10e3/uL 0.1   Absolute Immature Granulocytes      <=0.4 10e3/uL 0.0   Absolute NRBCs      10e3/uL 0.0   Color Urine      Colorless, Straw, Light Yellow, Yellow Yellow   Appearance Urine      Clear Clear   Glucose Urine      Negative mg/dL Negative   Bilirubin Urine      Negative Negative   Ketones Urine      Negative mg/dL Negative   Specific Gravity Urine      1.003 - 1.035 1.004   Blood Urine      Negative Negative   pH Urine      5.0 - 7.0 6.0   Protein Albumin Urine      Negative mg/dL Negative   Urobilinogen mg/dL      Normal, 2.0 mg/dL Normal   Nitrite Urine      Negative Negative   Leukocyte Esterase Urine      Negative Negative   RBC Urine      <=2 /HPF <1   WBC Urine      <=5 /HPF <1   MPO Zenaida IgG Instrument Value      <3.5 U/mL 102.0 (H)   Myeloperoxidase Antibody IgG      Negative Positive (A)   Proteinase 3 Zenaida IgG Instrument Value      <2.0 U/mL <1.0   Proteinase 3 Antibody IgG      Negative Negative   IGG      610-1,616 mg/dL 1,146   IGA      84 - 499 mg/dL 168   IGM      35 - 242 mg/dL 30 (L)   Protein Random Urine      g/L <0.05   Protein Total Urine g/gr Creatinine          Creatinine Urine      mg/dL 24   Neutrophil Cytoplasmic Antibody      <1:10 1:160 (H)   Neutrophil Cytoplasmic Antibody Pattern       Perinuclear ANCA (p-ANCA) staining pattern observed and confirmed on formalin-fixed neutrophils.   CD19 B Cells      6 - 27 % 7   Absolute CD19      107 - 698 cells/uL 116   Creatinine      0.66 - 1.25 mg/dL 1.06   GFR Estimate      >60 mL/min/1.73m2 89   Sed Rate      0 - 15 mm/hr 13   CRP Inflammation      0.0 - 8.0 mg/L 7.4   Albumin      3.4 - 5.0 g/dL 3.7   ALT      0 - 70 U/L 40   AST      0 - 45 U/L 38     Component      Latest Ref Rng & Units 9/12/2022    WBC      4.0 - 11.0 10e3/uL 5.0   RBC Count      4.40 - 5.90 10e6/uL 4.50   Hemoglobin      13.3 - 17.7 g/dL 15.0   Hematocrit      40.0 - 53.0 % 43.5   MCV      78 - 100 fL 97   MCH      26.5 - 33.0 pg 33.3 (H)   MCHC      31.5 - 36.5 g/dL 34.5   RDW      10.0 - 15.0 % 12.5   Platelet Count      150 - 450 10e3/uL 221   % Neutrophils      % 49   % Lymphocytes      % 36   % Monocytes      % 12   % Eosinophils      % 1   % Basophils      % 1   % Immature Granulocytes      % 1   NRBCs per 100 WBC      <1 /100 0   Absolute Neutrophils      1.6 - 8.3 10e3/uL 2.4   Absolute Lymphocytes      0.8 - 5.3 10e3/uL 1.8   Absolute Monocytes      0.0 - 1.3 10e3/uL 0.6   Absolute Eosinophils      0.0 - 0.7 10e3/uL 0.0   Absolute Basophils      0.0 - 0.2 10e3/uL 0.1   Absolute Immature Granulocytes      <=0.4 10e3/uL 0.0   Absolute NRBCs      10e3/uL 0.0   Color Urine      Colorless, Straw, Light Yellow, Yellow Yellow   Appearance Urine      Clear Clear   Glucose Urine      Negative mg/dL Negative   Bilirubin Urine      Negative Negative   Ketones Urine      Negative mg/dL Negative   Specific Gravity Urine      1.003 - 1.035 1.005   Blood Urine      Negative Negative   pH Urine      5.0 - 7.0 5.0   Protein Albumin Urine      Negative mg/dL Negative   Urobilinogen mg/dL      Normal, 2.0 mg/dL Normal   Nitrite Urine      Negative Negative   Leukocyte Esterase Urine      Negative Negative   RBC Urine      <=2 /HPF 0   WBC Urine      <=5 /HPF 0   MPO Zenaida IgG Instrument Value      <3.5 U/mL 8.0 (H)   Myeloperoxidase Antibody IgG      Negative Positive (A)   Proteinase 3 Zenaida IgG Instrument Value      <2.0 U/mL <1.0   Proteinase 3 Antibody IgG      Negative Negative   Total Protein Urine mg/dL      mg/dL <6.0   Total Protein UR MG/MG CR          Creatinine Urine      mg/dL 20.3   IGG      610 - 1,616 mg/dL 906   IGA      84 - 499 mg/dL 160   IGM      35 - 242 mg/dL 32 (L)   Creatinine      0.67 - 1.17 mg/dL 1.06   GFR Estimate      >60  mL/min/1.73m2 89   CD19 B Cells      6 - 27 % 8   Absolute CD19      107 - 698 cells/uL 151   Neutrophil Cytoplasmic Antibody      <1:10 <1:10   Neutrophil Cytoplasmic Antibody Pattern       The ANCA IFA is <1:10.  No further testing will be performed.   AST      10 - 50 U/L 36   ALT      10 - 50 U/L 35   Albumin      3.5 - 5.2 g/dL 4.5   CRP Inflammation      <5.00 mg/L <3.00   Sed Rate      0 - 15 mm/hr 6     Component      Latest Ref Rng & Units 11/1/2022   WBC      4.0 - 11.0 10e3/uL 6.4   RBC Count      4.40 - 5.90 10e6/uL 4.44   Hemoglobin      13.3 - 17.7 g/dL 14.8   Hematocrit      40.0 - 53.0 % 42.7   MCV      78 - 100 fL 96   MCH      26.5 - 33.0 pg 33.3 (H)   MCHC      31.5 - 36.5 g/dL 34.7   RDW      10.0 - 15.0 % 12.5   Platelet Count      150 - 450 10e3/uL 247   % Neutrophils      % 62   % Lymphocytes      % 26   % Monocytes      % 9   % Eosinophils      % 2   % Basophils      % 1   % Immature Granulocytes      % 0   NRBCs per 100 WBC      <1 /100 0   Absolute Neutrophils      1.6 - 8.3 10e3/uL 4.0   Absolute Lymphocytes      0.8 - 5.3 10e3/uL 1.7   Absolute Monocytes      0.0 - 1.3 10e3/uL 0.6   Absolute Eosinophils      0.0 - 0.7 10e3/uL 0.1   Absolute Basophils      0.0 - 0.2 10e3/uL 0.1   Absolute Immature Granulocytes      <=0.4 10e3/uL 0.0   Absolute NRBCs      10e3/uL 0.0   Color Urine      Colorless, Straw, Light Yellow, Yellow Straw   Appearance Urine      Clear Clear   Glucose Urine      Negative mg/dL Negative   Bilirubin Urine      Negative Negative   Ketones Urine      Negative mg/dL Negative   Specific Gravity Urine      1.003 - 1.035 1.010   Blood Urine      Negative Negative   pH Urine      5.0 - 7.0 6.0   Protein Albumin Urine      Negative mg/dL Negative   Urobilinogen mg/dL      Normal, 2.0 mg/dL Normal   Nitrite Urine      Negative Negative   Leukocyte Esterase Urine      Negative Negative   RBC Urine      <=2 /HPF 0   WBC Urine      <=5 /HPF <1   MPO Zenaida IgG Instrument Value       <3.5 U/mL 1.5   Myeloperoxidase Antibody IgG      Negative Negative   Proteinase 3 Zenaida IgG Instrument Value      <2.0 U/mL <1.0   Proteinase 3 Antibody IgG      Negative Negative   Total Protein Urine mg/dL      mg/dL <6.0   Total Protein UR MG/MG CR          Creatinine Urine      mg/dL 36.7   IGG      610 - 1,616 mg/dL 948   IGA      84 - 499 mg/dL 154   IGM      35 - 242 mg/dL 27 (L)   Creatinine      0.67 - 1.17 mg/dL 0.97   GFR Estimate      >60 mL/min/1.73m2 >90   CD19 B Cells      6 - 27 % 13   Absolute CD19      107 - 698 cells/uL 240   Neutrophil Cytoplasmic Antibody      <1:10 <1:10   Neutrophil Cytoplasmic Antibody Pattern       The ANCA IFA is <1:10.  No further testing will be performed.   AST      10 - 50 U/L 44   ALT      10 - 50 U/L 37   Albumin      3.5 - 5.2 g/dL 4.6   CRP Inflammation      <5.00 mg/L <3.00   Sed Rate      0 - 15 mm/hr 6     Component      Latest Ref [facility] 6/6/2023  11:57 AM   WBC      4.0 - 11.0 10e3/uL 6.3    RBC Count      4.40 - 5.90 10e6/uL 4.22 (L)    Hemoglobin      13.3 - 17.7 g/dL 14.0    Hematocrit      40.0 - 53.0 % 40.6    MCV      78 - 100 fL 96    MCH      26.5 - 33.0 pg 33.2 (H)    MCHC      31.5 - 36.5 g/dL 34.5    RDW      10.0 - 15.0 % 12.3    Platelet Count      150 - 450 10e3/uL 325    % Neutrophils      % 58    % Lymphocytes      % 26    % Monocytes      % 8    % Eosinophils      % 6    % Basophils      % 2    % Immature Granulocytes      % 0    NRBCs per 100 WBC      <1 /100 0    Absolute Neutrophils      1.6 - 8.3 10e3/uL 3.7    Absolute Lymphocytes      0.8 - 5.3 10e3/uL 1.7    Absolute Monocytes      0.0 - 1.3 10e3/uL 0.5    Absolute Eosinophils      0.0 - 0.7 10e3/uL 0.4    Absolute Basophils      0.0 - 0.2 10e3/uL 0.1    Absolute Immature Granulocytes      <=0.4 10e3/uL 0.0    Absolute NRBCs      10e3/uL 0.0    Color Urine      Colorless, Straw, Light Yellow, Yellow     Appearance Urine      Clear     Glucose Urine      Negative mg/dL    Bilirubin Urine       Negative     Ketones Urine      Negative mg/dL    Specific Gravity Urine      1.003 - 1.035     Blood Urine      Negative     pH Urine      5.0 - 7.0     Protein Albumin Urine      Negative mg/dL    Urobilinogen mg/dL      Normal, 2.0 mg/dL    Nitrite Urine      Negative     Leukocyte Esterase Urine      Negative     RBC Urine      <=2 /HPF    WBC Urine      <=5 /HPF    MPO Zenaida IgG Instrument Value      <3.5 U/mL 12.0 (H)    Myeloperoxidase Antibody IgG      Negative  Positive !    Proteinase 3 Zenaida IgG Instrument Value      <2.0 U/mL <1.0    Proteinase 3 Antibody IgG      Negative  Negative    Total Protein Urine mg/dL      1.0 - 14.0 mg/dL    Total Protein UR MG/MG CR    Creatinine Urine      mg/dL    CD19 B Cells      6 - 27 % 11    Absolute CD19      107 - 698 cells/uL 182    IGG      610 - 1,616 mg/dL 1,204    IGA      84 - 499 mg/dL 183    IGM      35 - 242 mg/dL 49    Creatinine      0.67 - 1.17 mg/dL 0.89    GFR Estimate      >60 mL/min/1.73m2 >90    Neutrophil Cytoplasmic Antibody      <1:10  1:10 (H)    Neutrophil Cytoplasmic Antibody Pattern Cytoplasmic ANCA (c-ANCA) staining pattern observed and confirmed on formalin-fixed neutrophils.    AST      10 - 50 U/L 41    ALT      10 - 50 U/L 47    Albumin      3.5 - 5.2 g/dL 4.3    CRP Inflammation      <5.00 mg/L 4.24    Sed Rate      0 - 15 mm/hr 18 (H)      Component      Latest Ref [facility] 6/6/2023  12:00 PM   WBC      4.0 - 11.0 10e3/uL    RBC Count      4.40 - 5.90 10e6/uL    Hemoglobin      13.3 - 17.7 g/dL    Hematocrit      40.0 - 53.0 %    MCV      78 - 100 fL    MCH      26.5 - 33.0 pg    MCHC      31.5 - 36.5 g/dL    RDW      10.0 - 15.0 %    Platelet Count      150 - 450 10e3/uL    % Neutrophils      %    % Lymphocytes      %    % Monocytes      %    % Eosinophils      %    % Basophils      %    % Immature Granulocytes      %    NRBCs per 100 WBC      <1 /100    Absolute Neutrophils      1.6 - 8.3 10e3/uL    Absolute Lymphocytes      0.8 - 5.3 10e3/uL     Absolute Monocytes      0.0 - 1.3 10e3/uL    Absolute Eosinophils      0.0 - 0.7 10e3/uL    Absolute Basophils      0.0 - 0.2 10e3/uL    Absolute Immature Granulocytes      <=0.4 10e3/uL    Absolute NRBCs      10e3/uL    Color Urine      Colorless, Straw, Light Yellow, Yellow  Straw    Appearance Urine      Clear  Clear    Glucose Urine      Negative mg/dL Negative    Bilirubin Urine      Negative  Negative    Ketones Urine      Negative mg/dL Negative    Specific Gravity Urine      1.003 - 1.035  1.009    Blood Urine      Negative  Negative    pH Urine      5.0 - 7.0  5.5    Protein Albumin Urine      Negative mg/dL Negative    Urobilinogen mg/dL      Normal, 2.0 mg/dL Normal    Nitrite Urine      Negative  Negative    Leukocyte Esterase Urine      Negative  Negative    RBC Urine      <=2 /HPF <1    WBC Urine      <=5 /HPF <1    MPO Zenaida IgG Instrument Value      <3.5 U/mL    Myeloperoxidase Antibody IgG      Negative     Proteinase 3 Zenaida IgG Instrument Value      <2.0 U/mL    Proteinase 3 Antibody IgG      Negative     Total Protein Urine mg/dL      1.0 - 14.0 mg/dL <6.0    Total Protein UR MG/MG CR --    Creatinine Urine      mg/dL 27.9    CD19 B Cells      6 - 27 %    Absolute CD19      107 - 698 cells/uL    IGG      610 - 1,616 mg/dL    IGA      84 - 499 mg/dL    IGM      35 - 242 mg/dL    Creatinine      0.67 - 1.17 mg/dL    GFR Estimate      >60 mL/min/1.73m2    Neutrophil Cytoplasmic Antibody      <1:10     Neutrophil Cytoplasmic Antibody Pattern    AST      10 - 50 U/L    ALT      10 - 50 U/L    Albumin      3.5 - 5.2 g/dL    CRP Inflammation      <5.00 mg/L    Sed Rate      0 - 15 mm/hr       Legend:  (L) Low  (H) High  ! Abnormal  Component      Latest Ref [facility] 10/11/2023  11:32 AM   MPO Zenaida IgG Instrument Value      <3.5 U/mL 12.0 (H)    Myeloperoxidase Antibody IgG      Negative  Positive !    Proteinase 3 Zenaida IgG Instrument Value      <2.0 U/mL <1.0    Proteinase 3 Antibody IgG      Negative  Negative     Neutrophil Cytoplasmic Antibody      <1:10  <1:10    Neutrophil Cytoplasmic Antibody Pattern The ANCA IFA is <1:10. No further testing will be performed.       Legend:  (H) High  ! Abnormal

## 2024-05-23 NOTE — LETTER
Date:September 12, 2022      Patient was self referred, no letter generated. Do not send.        [facility] Information       23-May-2024 16:22

## 2024-10-09 ENCOUNTER — TELEPHONE (OUTPATIENT)
Dept: RHEUMATOLOGY | Facility: CLINIC | Age: 44
End: 2024-10-09
Payer: COMMERCIAL

## 2024-10-09 NOTE — TELEPHONE ENCOUNTER
Patient confirmed scheduled appointment:  Date: January 29th, 2025  Time: 2p  Visit type: [facility]  Provider: Dr. Manley  Location: [facility] [place]  Testing/imaging: NA  Additional notes: Rescheduled 1/2 appt

## 2024-10-17 ENCOUNTER — LAB REQUISITION (OUTPATIENT)
Dept: LAB | Facility: CLINIC | Age: 44
End: 2024-10-17
Payer: COMMERCIAL

## 2024-10-17 PROCEDURE — 87070 CULTURE OTHR SPECIMN AEROBIC: CPT | Mod: ORL | Performed by: OTOLARYNGOLOGY

## 2024-10-17 PROCEDURE — 87102 FUNGUS ISOLATION CULTURE: CPT | Mod: ORL | Performed by: OTOLARYNGOLOGY

## 2024-10-19 LAB — BACTERIA SPEC CULT: NORMAL

## 2024-11-14 LAB — BACTERIA SPEC CULT: NO GROWTH

## 2024-12-04 ENCOUNTER — VIRTUAL VISIT (OUTPATIENT)
Dept: RHEUMATOLOGY | Facility: CLINIC | Age: 44
End: 2024-12-04
Attending: INTERNAL MEDICINE
Payer: COMMERCIAL

## 2024-12-04 VITALS — WEIGHT: 192 LBS | HEIGHT: 68 IN | BODY MASS INDEX: 29.1 KG/M2

## 2024-12-04 DIAGNOSIS — D72.18 EOSINOPHILIC GRANULOMATOSIS WITH POLYANGIITIS (EGPA) (H): Primary | ICD-10-CM

## 2024-12-04 DIAGNOSIS — Z51.81 MEDICATION MONITORING ENCOUNTER: ICD-10-CM

## 2024-12-04 DIAGNOSIS — M30.1 EOSINOPHILIC GRANULOMATOSIS WITH POLYANGIITIS (EGPA) (H): Primary | ICD-10-CM

## 2024-12-04 PROCEDURE — 99214 OFFICE O/P EST MOD 30 MIN: CPT | Mod: 95 | Performed by: INTERNAL MEDICINE

## 2024-12-04 PROCEDURE — G2211 COMPLEX E/M VISIT ADD ON: HCPCS | Performed by: INTERNAL MEDICINE

## 2024-12-04 ASSESSMENT — PAIN SCALES - GENERAL: PAINLEVEL_OUTOF10: NO PAIN (0)

## 2024-12-04 NOTE — PROGRESS NOTES
Virtual Visit Details    Type of service:  Video Visit     Joined the call at 12/4/2024, 3:55:18 pm.  Left the call at 12/4/2024, 4:00:35 pm.    Originating Location (pt. 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 He has previously failed methotrexate, azathioprine, Nucala and, most recently, MMF due to recurrent sinusitis and respiratory symptoms.     11/2022: Specifically, methotrexate 8 tabs weekly was associated with recurrent URI with no improvement in symptoms on 6 tabs weekly. He also then transitioned to AZA in 8/2019 (NL TPMT) but was put on hold in 11/2019 given recurrent sinusitis and antibiotic tx, ultimately requiring sinus surgery/polypectomy on 12/10/2019. He retried AZA 50 mg bid on 3/2020 but had a transient elevated LFTs and improvement of MPO even before resuming AZA. He subsequently trialed Nucala on 2/2022 but discontinued on 9/2022 due to persistent sinusitis and symptoms flares. 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    At this time, the patient is still completing his antibiotic course for Moraxella sinusitis and would prefer to hold off on further immunosuppression. He does feel that his symptoms are controlled at present on antibiotics and Medrol dose pack. We discussed risks and benefits of not restarting immunosuppression, and he was agreeable to considering a retrial of MMF pending further discussion at  follow-up in 3 months. Exam today was otherwise unremarkable, and labs were notable for unremarkable inflammatory markers and ANCA.     3/22/2023: Resumed  mg bid recently, was on it for about 3 wk, but started having sinus sx 2 wk ago, stopped MMF (cellcept), took medrol dose pack, no antibiotic needed this time. Stable labs from 11/2022 with neg ANCA and normal ESR/CRP, no asthma flares and reportedly almost no recurrence of nasal polyps. Feeling well today. Will check labs this month and monitor off MMF for flares. If he continues to flare up with sinus sx, will consider a trial of rituximab. Risks were discussed.       7/19/2023: stable with stable labs (except slight elevation of vasculitis markers), will monitor off tx. Recent labs were reviewed and discussed.    Today's Plan:    Continue follow-up with ENT    Labs (just ANCA, MPO) in 9/2023 and every 3 months      Return in 6 months (video), could be re-scheduled if no complaints    2/15/2024:    Stable ANCA vasculitis off Tx, stable labs. Will continue to monitor sx off tx.    Plan:    Labs at [facility], please fax orders    Return video visit in 6 months      Today 5/15/2024:    EGPA flare responding to high dose steroid taper, I called and spoke with Mj's pulmonologist and we agreed on trying Fasenra next. Lupillo was present during discussion and agreed with the plan. He has tried and failed AZA, MMF, MTX and nucala.     5/15/2024 plan:    Benralizumab (Fasenra) will be ordered by pulmonology, if fails Dupixent will be next    Labs in 2 weeks (will do it after being off steroid)    Keep Aug appointment    12/4/2024:    S/p one dose of Fasenra 11/25, doing well, stable EGPA, will continue.    Plan:    Labs in 1/2025 at [facility]    Return in 6 months (virtual)    TT 30 min was spent on date of the encounter doing chart review, history and exam, documentation and further activities as noted above. Any prior notes, outside records, laboratory results, and  imaging studies were reviewed if relevant.      The longitudinal plan of care for the diagnosis(es)/condition(s) as documented were addressed during this visit. Due to the added complexity in care, I will continue to support Lucio in the subsequent management and with ongoing continuity of care.      Isabella Manley MD      Orders Placed This Encounter   Procedures    AST    ALT    Myeloperoxidase and Proteinase 3 Panel    Albumin level    Creatinine    CRP inflammation    UA with Microscopic reflex to Culture    Protein  random urine    Creatinine random urine    Erythrocyte sedimentation rate auto    CD19 B Cell Count    ANCA IgG by IFA with Reflex to Titer    Immunoglobulins A G and M    CBC with Platelets & Differential        HPI:    8/20/2021: On AZA 50 mg bid since end of 3/2020. Tolerates it well. Reports no recurrence of nasal polyps. He will do follow up with ENT. Labs from 2/2021 are stable. His EGPA seems to be stable. Reports seasonal allergies at this time of the year, using inhalor. Nasal congestion got better by missing AZA x 3 days.    We discussed covid booster vaccine, no official recommendation for J&J receivers yet but most likely will need a booster being immunocompromised.    2/4/2022: Flaring with EGPA, sinus inflammation, increased vasculitis markers. On and off AZA due to sinus infections. Recommend to switch to nucala to control EGPA; risks were discussed. Will treat with prednisone taper.    8/18/2022: Unclear if sinus sx are due to vasculitis and if he fails nucala or not, will get ENT opinion and check labs.    9/23/2022: Labs are stable, but clinically he continues to flare with sinusitis, nasal polyps, steroid responsive. Since he failed nucala, recommend to stop. Discussed next tx options cellcept versus rituximab. Agreed to try cellcept as safer option next; risks were discussed.    11/30/2022:    Patient reports that he discontinued MMF 2 weeks ago due to worsening congestion, post  nasal drip, cough and shortness of breath with subsequent ENT evaluation revealing nasal culture positive sinus infection on 11/11/22. He was started on an unspecified antibiotic on 11/16 and began PRN Medrol dose pack provided by Rheumatology staff with immediate improvement in symptoms. He now continues to have aforementioned symptoms but reduced in severity.    No other acute complaints when seen today. Patient overall reports feeling that symptoms are controlled on antibiotics and steroids and would prefer to wait on restarting MMF or other immunosuppressive regimen at this time.       3/22/2023:     Tried MMF again for about 3wk before travelling to [facility], got recurrence of sinus sx, tried medrol dose pack and stopped cellcept, this was 2 wk ago, medrol helped.    Asthma is well controlled on Symbicort inh only, has not required rescue inhaler.     No new sx of rash, neuropathy, joint pain.    Feeling well today.    7/19/2023:    -Sometimes sinuses feels plugged on especially with moaning the lawn, uses Symbicort once-twice a day, uses rescue inhaler less often.    -No more steroid use since last visit    -No recent infection needing antibiotic        History of Presenting Illness    2/4/2022: Had 2 sinusitis, went on 2 rounds of antibiotics. Had 3 different bacteria on the culture. Off antibiotic x 2 weeks, resumed AZA x past 2 weeks. Still gets PND and coughs a lot. ENT saw inflammation on exam with no infection, this was about 2.5 wk ago when he was recommended to resume AZA. Was off AZA from 9/2021 till two weeks ago.  1st and 2nd round of antibiotic, did not come off AZA, just with 3rd round. Has some SOB, uses inhalers more. Gets winded more with exercise.     8/19/2022: His nose gets stuffy on lying down. Saw ENT and was given prednisone and it helped some, but cough and congestion is coming back off prednisone. Took last prednisone last week. It was 40 mg every day max. Seasonal allergies  are triggers. Sometimes it is harder to breathe when he is working out.     9/23/2022: Lucio presents for follow up, continues to have flares of sinusitis with nasal polyps despite being on nucala, which are steroid responsive.    Mj Portillodennis Brown. 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    He is doing fairly well. No recurrence of nasal polyps on imuran. Last seen by ENT in 12/2020, has upcoming appointment. Reports having seasonal allergies at this time of the year, has been using his inhalers x past 2 weeks. Reports some nasal congestion, it was better when he ran out of AZA x 3 days early this week.    He sleeps at the side, has block nostril, was better on medrol dose pack.    Medrol dose pack finished yesterday. Last antibiotic was a month ago. Medrol works better, prednisone does not help.    Uses inhaler to prevent asthma.    No joint pain or skin rash.    Had surgery for R shoulder rotator cuff full tear which was successful.    Had J&J covid vaccine on 4/8/2021, tolerated it well.        2/15/2024:      Was feeling stuffy due to allergies around Thanksgiving; otherwise no flares of vasculitis, no nasal polyps, no infections      About 1.5 months ago, started having more coughing with more mucous, harder to breath wit hwork out, then started using inhlaer, same as Nov nothin gfdifferent pollens    Went and had polyps checked, minimal nasal polyps but they have been stable, not as bad as years ago,     Always gets congested     5/15/2024:    Presents with EGPA flare, managed by pulmonary    Prednisone 60 mg every day-40-20 mg a day, 1 wk after steroid, cough resolved and went away    Cough is coming back little bit now     Will be down on prednisone 10 mg qd tomorrow    Sense of smell is back    No skin rash, joint pain, foot or wrist drop, numbness/tingling    Does work out      Today 12/4/2024:    S/p one dose of Fasenra 11/25, doing well, stable EGPA    Has not required  recent steroid or antibiotics    Does work out    Family History   Problem Relation Age of Onset    Colon Cancer Maternal Grandmother     Stomach Cancer Paternal Grandmother     No Known Problems Mother     No Known Problems Father     No Known Problems Maternal Grandfather     No Known Problems Paternal Grandfather     No Known Problems Brother     No Known Problems Sister     No Known Problems Son     No Known Problems Daughter     No Known Problems Maternal Half-Brother     No Known Problems Maternal Half-Sister     No Known Problems Paternal Half-Brother     No Known Problems Paternal Half-Sister     No Known Problems Niece     No Known Problems Nephew     No Known Problems Cousin     No Known Problems Other     Cancer No family hx of     Diabetes No family hx of     Heart Disease No family hx of     Hypertension No family hx of     Cerebrovascular Disease No family hx of     Asthma No family hx of     Thyroid Disease No family hx of     Depression No family hx of     Mental Illness No family hx of     Substance Abuse No family hx of     Dementia No family hx of     Bleeding Disorder No family hx of     Congenital Anomalies No family hx of     Migraines No family hx of     Stomach Problem No family hx of     Muscular Disorder No family hx of     Osteoporosis No family hx of     Unknown/Adopted No family hx of      Social History     Socioeconomic History    Marital status:      Spouse name: Not on file    Number of children: 2    Years of education: Not on file    Highest education level: Not on file   Occupational History    Occupation:  manager   Tobacco Use    Smoking status: Never     Passive exposure: Past    Smokeless tobacco: Never   Substance and Sexual Activity    Alcohol use: Yes    Drug use: Never    Sexual activity: Not on file   Other Topics Concern    Not on file   Social History Narrative    Not on file     Social Drivers of Health     Financial Resource Strain: High Risk  "(12/22/2021)    Received from "[employer], [facility]    Financial Resource Strain     Difficulty of Paying Living Expenses: Not on file     Difficulty of Paying Living Expenses: Not on file   Food Insecurity: Not on file   Transportation Needs: Not on file   Physical Activity: Not on file   Stress: Not on file   Social Connections: Unknown (12/22/2021)    Received from "[employer], "[employer]    Social Connections     Frequency of Communication with Friends and Family: Not on file   Interpersonal Safety: Not on file   Housing Stability: Not on file       No Known Allergies    Outpatient Encounter Medications as of 12/4/2024   Medication Sig Dispense Refill    albuterol (PROAIR HFA/PROVENTIL HFA/VENTOLIN HFA) 108 (90 Base) MCG/ACT inhaler as needed       benralizumab (FASENRA) 30 MG/ML SOAJ auto-injector pen Inject 30 mg subcutaneously. 30 mg every 4 weeks for the first 3 doses, then once every 8 weeks      budesonide (PULMICORT) 0.5 MG/2ML neb solution       FLOVENT  MCG/ACT inhaler Inhale 1 puff into the lungs daily      levocetirizine (XYZAL) 5 MG tablet Take 5 mg by mouth every morning (Patient not taking: Reported on 3/22/2023)      methylPREDNISolone (MEDROL DOSEPAK) 4 MG tablet therapy pack Take per package instructions. As needed for flare up (Patient not taking: Reported on 3/22/2023) 21 tablet 3    montelukast (SINGULAIR) 10 MG tablet Take 10 mg by mouth At Bedtime       SYMBICORT 160-4.5 MCG/ACT Inhaler Inhale 2 puffs into the lungs 2 times daily        No facility-administered encounter medications on file as of 12/4/2024.       ROS:  A comprehensive ROS was done, positives are per HPI.    Ph.E:    Ht 1.727 m (5' 8\")   Wt 87.1 kg (192 lb)   BMI 29.19 kg/m      Constitutional: WD/WN. Pleasant. In no acute distress. Sounds congested  HEENT: EOM intact, sclera " anicteric, conj not injected. No saddle nose deformity  MS: No synovitis   Skin: No skin rash  Neuro: A&O x 3  Psych: NL affect     His records were reviewed.    4/2019: pos MPO, p-ANCA    7/2019: NL ESR/CRP    Component      Latest Ref Rng & Units 3/25/2020   WBC      4.0 - 11.0 10e9/L 3.8 (L)   RBC Count      4.4 - 5.9 10e12/L 4.53   Hemoglobin      13.3 - 17.7 g/dL 15.1   Hematocrit      40.0 - 53.0 % 45.3   MCV      78 - 100 fl 100   MCH      26.5 - 33.0 pg 33.3 (H)   MCHC      31.5 - 36.5 g/dL 33.3   RDW      10.0 - 15.0 % 13.0   Platelet Count      150 - 450 10e9/L 235   Diff Method       Automated Method   % Neutrophils      % 43.3   % Lymphocytes      % 41.0   % Monocytes      % 9.0   % Eosinophils      % 4.8   % Basophils      % 1.6   % Immature Granulocytes      % 0.3   Nucleated RBCs      0 /100 0   Absolute Neutrophil      1.6 - 8.3 10e9/L 1.6   Absolute Lymphocytes      0.8 - 5.3 10e9/L 1.5   Absolute Monocytes      0.0 - 1.3 10e9/L 0.3   Absolute Eosinophils      0.0 - 0.7 10e9/L 0.2   Absolute Basophils      0.0 - 0.2 10e9/L 0.1   Abs Immature Granulocytes      0 - 0.4 10e9/L 0.0   Absolute Nucleated RBC       0.0   Sodium      133 - 144 mmol/L 140   Potassium      3.4 - 5.3 mmol/L 3.6   Chloride      94 - 109 mmol/L 106   Carbon Dioxide      20 - 32 mmol/L 32   Anion Gap      3 - 14 mmol/L 2 (L)   Glucose      70 - 99 mg/dL 53 (L)   Urea Nitrogen      7 - 30 mg/dL 22   Creatinine      0.66 - 1.25 mg/dL 0.98   GFR Estimate      >60 mL/min/1.73:m2 >90   GFR Estimate If Black      >60 mL/min/1.73:m2 >90   Calcium      8.5 - 10.1 mg/dL 9.1   Bilirubin Total      0.2 - 1.3 mg/dL 0.6   Albumin      3.4 - 5.0 g/dL 4.4   Protein Total      6.8 - 8.8 g/dL 7.5   Alkaline Phosphatase      40 - 150 U/L 52   ALT      0 - 70 U/L 45   AST      0 - 45 U/L 28   Color Urine       Yellow   Appearance Urine       Clear   Glucose Urine      NEG:Negative mg/dL Negative   Bilirubin Urine      NEG:Negative Negative    Ketones Urine      NEG:Negative mg/dL Negative   Specific Gravity Urine      1.003 - 1.035 1.006   Blood Urine      NEG:Negative Negative   pH Urine      5.0 - 7.0 pH 7.0   Protein Albumin Urine      NEG:Negative mg/dL Negative   Urobilinogen mg/dL      0.0 - 2.0 mg/dL 0.0   Nitrite Urine      NEG:Negative Negative   Leukocyte Esterase Urine      NEG:Negative Negative   Source       Midstream Urine   WBC Urine      0 - 5 /HPF 0   RBC Urine      0 - 2 /HPF 0   Squamous Epithelial /HPF Urine      0 - 1 /HPF <1   Neutrophil Cytoplasmic Antibody      <1:10 titer <1:10   Neutrophil Cytoplasmic Antibody Pattern       The ANCA IFA is <1:10.  No further testing will be performed.   Protein Random Urine      g/L <0.05   Protein Total Urine g/gr Creatinine      0 - 0.2 g/g Cr Unable to calculate due to low value   Myeloperoxidase Antibody IgG      0.0 - 0.9 AI 4.7 (H)   Proteinase 3 Antibody IgG      0.0 - 0.9 AI <0.2   IGE      0 - 114 KIU/L 22   CRP Inflammation      0.0 - 8.0 mg/L <2.9   Sed Rate      0 - 15 mm/h 5   Creatinine Urine      mg/dL 24     Stable labs in 2/2021    Component      Latest Ref Rng & Units 11/16/2021   Color Urine      Colorless, Straw, Light Yellow, Yellow Yellow   Appearance Urine      Clear Clear   Glucose Urine      Negative mg/dL Negative   Bilirubin Urine      Negative Negative   Ketones Urine      Negative mg/dL Negative   Specific Gravity Urine      1.003 - 1.035 1.009   Blood Urine      Negative Negative   pH Urine      5.0 - 7.0 6.0   Protein Albumin Urine      Negative mg/dL Negative   Urobilinogen mg/dL      Normal, 2.0 mg/dL Normal   Nitrite Urine      Negative Negative   Leukocyte Esterase Urine      Negative Negative   RBC Urine      <=2 /HPF <1   WBC Urine      <=5 /HPF 0   MPO Zenaida IgG Instrument Value      <3.5 U/mL 17.0 (H)   Myeloperoxidase Antibody IgG      Negative Positive (A)   Proteinase 3 Zenaida IgG Instrument Value      <2.0 U/mL <1.0   Proteinase 3 Antibody IgG       Negative Negative   Protein Random Urine      g/L <0.05   Protein Total Urine g/gr Creatinine          Creatinine Urine      mg/dL 37   IGG      610-1,616 mg/dL 1,053   IGA      84 - 499 mg/dL 178   IGM      35 - 242 mg/dL 28 (L)   Creatinine      0.66 - 1.25 mg/dL 0.92   GFR Estimate      >60 mL/min/1.73m2 >90   CD19 B Cells      6 - 27 % 7   Absolute CD19      107 - 698 cells/uL 92 (L)   Neutrophil Cytoplasmic Antibody      <1:10 <1:10   Neutrophil Cytoplasmic Antibody Pattern       The ANCA IFA is <1:10.  No further testing will be performed.   AST      0 - 45 U/L 34   ALT      0 - 70 U/L 46   Albumin      3.4 - 5.0 g/dL 3.8   CRP Inflammation      0.0 - 8.0 mg/L <2.9   Sed Rate      0 - 15 mm/hr 10     Component      Latest Ref Rng & Units 2/1/2022   Color Urine      Colorless, Straw, Light Yellow, Yellow Yellow   Appearance Urine      Clear Clear   Glucose Urine      Negative mg/dL Negative   Bilirubin Urine      Negative Negative   Ketones Urine      Negative mg/dL Negative   Specific Gravity Urine      1.003 - 1.035 1.004   Blood Urine      Negative Negative   pH Urine      5.0 - 7.0 6.0   Protein Albumin Urine      Negative mg/dL Negative   Urobilinogen mg/dL      Normal, 2.0 mg/dL Normal   Nitrite Urine      Negative Negative   Leukocyte Esterase Urine      Negative Negative   RBC Urine      <=2 /HPF <1   WBC Urine      <=5 /HPF <1   MPO Zenaida IgG Instrument Value      <3.5 U/mL    Myeloperoxidase Antibody IgG      Negative    Proteinase 3 Zenaida IgG Instrument Value      <2.0 U/mL    Proteinase 3 Antibody IgG      Negative    Protein Random Urine      g/L <0.05   Protein Total Urine g/gr Creatinine          Creatinine Urine      mg/dL 24   IGG      610-1,616 mg/dL 1,146   IGA      84 - 499 mg/dL 168   IGM      35 - 242 mg/dL 30 (L)   Creatinine      0.66 - 1.25 mg/dL 1.06   GFR Estimate      >60 mL/min/1.73m2 89   CD19 B Cells      6 - 27 % 7   Absolute CD19      107 - 698 cells/uL 116   Neutrophil Cytoplasmic  Antibody      <1:10 1:160 (H)   Neutrophil Cytoplasmic Antibody Pattern       Perinuclear ANCA (p-ANCA) staining pattern observed and confirmed on formalin-fixed neutrophils.   AST      0 - 45 U/L 38   ALT      0 - 70 U/L 40   Albumin      3.4 - 5.0 g/dL 3.7   CRP Inflammation      0.0 - 8.0 mg/L 7.4   Sed Rate      0 - 15 mm/hr 13     Component      Latest Ref Rng & Units 2/1/2022   WBC      4.0 - 11.0 10e3/uL 5.5   RBC Count      4.40 - 5.90 10e6/uL 4.39 (L)   Hemoglobin      13.3 - 17.7 g/dL 14.9   Hematocrit      40.0 - 53.0 % 42.8   MCV      78 - 100 fL 98   MCH      26.5 - 33.0 pg 33.9 (H)   MCHC      31.5 - 36.5 g/dL 34.8   RDW      10.0 - 15.0 % 12.2   Platelet Count      150 - 450 10e3/uL 257   % Neutrophils      % 54   % Lymphocytes      % 25   % Monocytes      % 10   % Eosinophils      % 9   % Basophils      % 2   % Immature Granulocytes      % 0   NRBCs per 100 WBC      <1 /100 0   Absolute Neutrophils      1.6 - 8.3 10e3/uL 3.0   Absolute Lymphocytes      0.8 - 5.3 10e3/uL 1.3   Absolute Monocytes      0.0 - 1.3 10e3/uL 0.6   Absolute Eosinophils      0.0 - 0.7 10e3/uL 0.5   Absolute Basophils      0.0 - 0.2 10e3/uL 0.1   Absolute Immature Granulocytes      <=0.4 10e3/uL 0.0   Absolute NRBCs      10e3/uL 0.0   Color Urine      Colorless, Straw, Light Yellow, Yellow Yellow   Appearance Urine      Clear Clear   Glucose Urine      Negative mg/dL Negative   Bilirubin Urine      Negative Negative   Ketones Urine      Negative mg/dL Negative   Specific Gravity Urine      1.003 - 1.035 1.004   Blood Urine      Negative Negative   pH Urine      5.0 - 7.0 6.0   Protein Albumin Urine      Negative mg/dL Negative   Urobilinogen mg/dL      Normal, 2.0 mg/dL Normal   Nitrite Urine      Negative Negative   Leukocyte Esterase Urine      Negative Negative   RBC Urine      <=2 /HPF <1   WBC Urine      <=5 /HPF <1   MPO Zenaida IgG Instrument Value      <3.5 U/mL 102.0 (H)   Myeloperoxidase Antibody IgG      Negative  Positive (A)   Proteinase 3 Zenaida IgG Instrument Value      <2.0 U/mL <1.0   Proteinase 3 Antibody IgG      Negative Negative   IGG      610-1,616 mg/dL 1,146   IGA      84 - 499 mg/dL 168   IGM      35 - 242 mg/dL 30 (L)   Protein Random Urine      g/L <0.05   Protein Total Urine g/gr Creatinine          Creatinine Urine      mg/dL 24   Neutrophil Cytoplasmic Antibody      <1:10 1:160 (H)   Neutrophil Cytoplasmic Antibody Pattern       Perinuclear ANCA (p-ANCA) staining pattern observed and confirmed on formalin-fixed neutrophils.   CD19 B Cells      6 - 27 % 7   Absolute CD19      107 - 698 cells/uL 116   Creatinine      0.66 - 1.25 mg/dL 1.06   GFR Estimate      >60 mL/min/1.73m2 89   Sed Rate      0 - 15 mm/hr 13   CRP Inflammation      0.0 - 8.0 mg/L 7.4   Albumin      3.4 - 5.0 g/dL 3.7   ALT      0 - 70 U/L 40   AST      0 - 45 U/L 38     Component      Latest Ref Rng & Units 9/12/2022   WBC      4.0 - 11.0 10e3/uL 5.0   RBC Count      4.40 - 5.90 10e6/uL 4.50   Hemoglobin      13.3 - 17.7 g/dL 15.0   Hematocrit      40.0 - 53.0 % 43.5   MCV      78 - 100 fL 97   MCH      26.5 - 33.0 pg 33.3 (H)   MCHC      31.5 - 36.5 g/dL 34.5   RDW      10.0 - 15.0 % 12.5   Platelet Count      150 - 450 10e3/uL 221   % Neutrophils      % 49   % Lymphocytes      % 36   % Monocytes      % 12   % Eosinophils      % 1   % Basophils      % 1   % Immature Granulocytes      % 1   NRBCs per 100 WBC      <1 /100 0   Absolute Neutrophils      1.6 - 8.3 10e3/uL 2.4   Absolute Lymphocytes      0.8 - 5.3 10e3/uL 1.8   Absolute Monocytes      0.0 - 1.3 10e3/uL 0.6   Absolute Eosinophils      0.0 - 0.7 10e3/uL 0.0   Absolute Basophils      0.0 - 0.2 10e3/uL 0.1   Absolute Immature Granulocytes      <=0.4 10e3/uL 0.0   Absolute NRBCs      10e3/uL 0.0   Color Urine      Colorless, Straw, Light Yellow, Yellow Yellow   Appearance Urine      Clear Clear   Glucose Urine      Negative mg/dL Negative   Bilirubin Urine      Negative Negative    Ketones Urine      Negative mg/dL Negative   Specific Gravity Urine      1.003 - 1.035 1.005   Blood Urine      Negative Negative   pH Urine      5.0 - 7.0 5.0   Protein Albumin Urine      Negative mg/dL Negative   Urobilinogen mg/dL      Normal, 2.0 mg/dL Normal   Nitrite Urine      Negative Negative   Leukocyte Esterase Urine      Negative Negative   RBC Urine      <=2 /HPF 0   WBC Urine      <=5 /HPF 0   MPO Zenaida IgG Instrument Value      <3.5 U/mL 8.0 (H)   Myeloperoxidase Antibody IgG      Negative Positive (A)   Proteinase 3 [facility] IgG Instrument Value      <2.0 U/mL <1.0   Proteinase 3 Antibody IgG      Negative Negative   Total Protein Urine mg/dL      mg/dL <6.0   Total Protein UR MG/MG CR          Creatinine Urine      mg/dL 20.3   IGG      610 - 1,616 mg/dL 906   IGA      84 - 499 mg/dL 160   IGM      35 - 242 mg/dL 32 (L)   Creatinine      0.67 - 1.17 mg/dL 1.06   GFR Estimate      >60 mL/min/1.73m2 89   CD19 B Cells      6 - 27 % 8   Absolute CD19      107 - 698 cells/uL 151   Neutrophil Cytoplasmic Antibody      <1:10 <1:10   Neutrophil Cytoplasmic Antibody Pattern       The ANCA IFA is <1:10.  No further testing will be performed.   AST      10 - 50 U/L 36   ALT      10 - 50 U/L 35   Albumin      3.5 - 5.2 g/dL 4.5   CRP Inflammation      <5.00 mg/L <3.00   Sed Rate      0 - 15 mm/hr 6     Component      Latest Ref Rng & Units 11/1/2022   WBC      4.0 - 11.0 10e3/uL 6.4   RBC Count      4.40 - 5.90 10e6/uL 4.44   Hemoglobin      13.3 - 17.7 g/dL 14.8   Hematocrit      40.0 - 53.0 % 42.7   MCV      78 - 100 fL 96   MCH      26.5 - 33.0 pg 33.3 (H)   MCHC      31.5 - 36.5 g/dL 34.7   RDW      10.0 - 15.0 % 12.5   Platelet Count      150 - 450 10e3/uL 247   % Neutrophils      % 62   % Lymphocytes      % 26   % Monocytes      % 9   % Eosinophils      % 2   % Basophils      % 1   % Immature Granulocytes      % 0   NRBCs per 100 WBC      <1 /100 0   Absolute Neutrophils      1.6 - 8.3 10e3/uL 4.0   Absolute  Lymphocytes      0.8 - 5.3 10e3/uL 1.7   Absolute Monocytes      0.0 - 1.3 10e3/uL 0.6   Absolute Eosinophils      0.0 - 0.7 10e3/uL 0.1   Absolute Basophils      0.0 - 0.2 10e3/uL 0.1   Absolute Immature Granulocytes      <=0.4 10e3/uL 0.0   Absolute NRBCs      10e3/uL 0.0   Color Urine      Colorless, Straw, Light Yellow, Yellow Straw   Appearance Urine      Clear Clear   Glucose Urine      Negative mg/dL Negative   Bilirubin Urine      Negative Negative   Ketones Urine      Negative mg/dL Negative   Specific Gravity Urine      1.003 - 1.035 1.010   Blood Urine      Negative Negative   pH Urine      5.0 - 7.0 6.0   Protein Albumin Urine      Negative mg/dL Negative   Urobilinogen mg/dL      Normal, 2.0 mg/dL Normal   Nitrite Urine      Negative Negative   Leukocyte Esterase Urine      Negative Negative   RBC Urine      <=2 /HPF 0   WBC Urine      <=5 /HPF <1   MPO [facility] IgG Instrument Value      <3.5 U/mL 1.5   Myeloperoxidase Antibody IgG      Negative Negative   Proteinase 3 [facility] IgG Instrument Value      <2.0 U/mL <1.0   Proteinase 3 Antibody IgG      Negative Negative   Total Protein Urine mg/dL      mg/dL <6.0   Total Protein UR MG/MG CR          Creatinine Urine      mg/dL 36.7   IGG      610 - 1,616 mg/dL 948   IGA      84 - 499 mg/dL 154   IGM      35 - 242 mg/dL 27 (L)   Creatinine      0.67 - 1.17 mg/dL 0.97   GFR Estimate      >60 mL/min/1.73m2 >90   CD19 B Cells      6 - 27 % 13   Absolute CD19      107 - 698 cells/uL 240   Neutrophil Cytoplasmic Antibody      <1:10 <1:10   Neutrophil Cytoplasmic Antibody Pattern       The ANCA IFA is <1:10.  No further testing will be performed.   AST      10 - 50 U/L 44   ALT      10 - 50 U/L 37   Albumin      3.5 - 5.2 g/dL 4.6   CRP Inflammation      <5.00 mg/L <3.00   Sed Rate      0 - 15 mm/hr 6     Component      Latest Ref [facility] 6/6/2023  11:57 AM   WBC      4.0 - 11.0 10e3/uL 6.3    RBC Count      4.40 - 5.90 10e6/uL 4.22 (L)    Hemoglobin      13.3 - 17.7 g/dL  14.0    Hematocrit      40.0 - 53.0 % 40.6    MCV      78 - 100 fL 96    MCH      26.5 - 33.0 pg 33.2 (H)    MCHC      31.5 - 36.5 g/dL 34.5    RDW      10.0 - 15.0 % 12.3    Platelet Count      150 - 450 10e3/uL 325    % Neutrophils      % 58    % Lymphocytes      % 26    % Monocytes      % 8    % Eosinophils      % 6    % Basophils      % 2    % Immature Granulocytes      % 0    NRBCs per 100 WBC      <1 /100 0    Absolute Neutrophils      1.6 - 8.3 10e3/uL 3.7    Absolute Lymphocytes      0.8 - 5.3 10e3/uL 1.7    Absolute Monocytes      0.0 - 1.3 10e3/uL 0.5    Absolute Eosinophils      0.0 - 0.7 10e3/uL 0.4    Absolute Basophils      0.0 - 0.2 10e3/uL 0.1    Absolute Immature Granulocytes      <=0.4 10e3/uL 0.0    Absolute NRBCs      10e3/uL 0.0    Color Urine      Colorless, Straw, Light Yellow, Yellow     Appearance Urine      Clear     Glucose Urine      Negative mg/dL    Bilirubin Urine      Negative     Ketones Urine      Negative mg/dL    Specific Gravity Urine      1.003 - 1.035     Blood Urine      Negative     pH Urine      5.0 - 7.0     Protein Albumin Urine      Negative mg/dL    Urobilinogen mg/dL      Normal, 2.0 mg/dL    Nitrite Urine      Negative     Leukocyte Esterase Urine      Negative     RBC Urine      <=2 /HPF    WBC Urine      <=5 /HPF    MPO [facility] IgG Instrument Value      <3.5 U/mL 12.0 (H)    Myeloperoxidase Antibody IgG      Negative  Positive !    Proteinase 3 [facility] IgG Instrument Value      <2.0 U/mL <1.0    Proteinase 3 Antibody IgG      Negative  Negative    Total Protein Urine mg/dL      1.0 - 14.0 mg/dL    Total Protein UR MG/MG CR    Creatinine Urine      mg/dL    CD19 B Cells      6 - 27 % 11    Absolute CD19      107 - 698 cells/uL 182    IGG      610 - 1,616 mg/dL 1,204    IGA      84 - 499 mg/dL 183    IGM      35 - 242 mg/dL 49    Creatinine      0.67 - 1.17 mg/dL 0.89    GFR Estimate      >60 mL/min/1.73m2 >90    Neutrophil Cytoplasmic Antibody      <1:10  1:10 (H)     Neutrophil Cytoplasmic Antibody Pattern Cytoplasmic ANCA (c-ANCA) staining pattern observed and confirmed on formalin-fixed neutrophils.    AST      10 - 50 U/L 41    ALT      10 - 50 U/L 47    Albumin      3.5 - 5.2 g/dL 4.3    CRP Inflammation      <5.00 mg/L 4.24    Sed Rate      0 - 15 mm/hr 18 (H)      Component      Latest Ref [facility] 6/6/2023  12:00 PM   WBC      4.0 - 11.0 10e3/uL    RBC Count      4.40 - 5.90 10e6/uL    Hemoglobin      13.3 - 17.7 g/dL    Hematocrit      40.0 - 53.0 %    MCV      78 - 100 fL    MCH      26.5 - 33.0 pg    MCHC      31.5 - 36.5 g/dL    RDW      10.0 - 15.0 %    Platelet Count      150 - 450 10e3/uL    % Neutrophils      %    % Lymphocytes      %    % Monocytes      %    % Eosinophils      %    % Basophils      %    % Immature Granulocytes      %    NRBCs per 100 WBC      <1 /100    Absolute Neutrophils      1.6 - 8.3 10e3/uL    Absolute Lymphocytes      0.8 - 5.3 10e3/uL    Absolute Monocytes      0.0 - 1.3 10e3/uL    Absolute Eosinophils      0.0 - 0.7 10e3/uL    Absolute Basophils      0.0 - 0.2 10e3/uL    Absolute Immature Granulocytes      <=0.4 10e3/uL    Absolute NRBCs      10e3/uL    Color Urine      Colorless, Straw, Light Yellow, Yellow  Straw    Appearance Urine      Clear  Clear    Glucose Urine      Negative mg/dL Negative    Bilirubin Urine      Negative  Negative    Ketones Urine      Negative mg/dL Negative    Specific Gravity Urine      1.003 - 1.035  1.009    Blood Urine      Negative  Negative    pH Urine      5.0 - 7.0  5.5    Protein Albumin Urine      Negative mg/dL Negative    Urobilinogen mg/dL      Normal, 2.0 mg/dL Normal    Nitrite Urine      Negative  Negative    Leukocyte Esterase Urine      Negative  Negative    RBC Urine      <=2 /HPF <1    WBC Urine      <=5 /HPF <1    MPO [facility] IgG Instrument Value      <3.5 U/mL    Myeloperoxidase Antibody IgG      Negative     Proteinase 3 [facility] IgG Instrument Value      <2.0 U/mL    Proteinase 3 Antibody IgG       Negative     Total Protein Urine mg/dL      1.0 - 14.0 mg/dL <6.0    Total Protein UR MG/MG CR --    Creatinine Urine      mg/dL 27.9    CD19 B Cells      6 - 27 %    Absolute CD19      107 - 698 cells/uL    IGG      610 - 1,616 mg/dL    IGA      84 - 499 mg/dL    IGM      35 - 242 mg/dL    Creatinine      0.67 - 1.17 mg/dL    GFR Estimate      >60 mL/min/1.73m2    Neutrophil Cytoplasmic Antibody      <1:10     Neutrophil Cytoplasmic Antibody Pattern    AST      10 - 50 U/L    ALT      10 - 50 U/L    Albumin      3.5 - 5.2 g/dL    CRP Inflammation      <5.00 mg/L    Sed Rate      0 - 15 mm/hr       Legend:  (L) Low  (H) High  ! Abnormal  Component      Latest Ref Rn 10/11/2023  11:32 AM   MPO [facility] IgG Instrument Value      <3.5 U/mL 12.0 (H)    Myeloperoxidase Antibody IgG      Negative  Positive !    Proteinase 3 [facility] IgG Instrument Value      <2.0 U/mL <1.0    Proteinase 3 Antibody IgG      Negative  Negative    Neutrophil Cytoplasmic Antibody      <1:10  <1:10    Neutrophil Cytoplasmic Antibody Pattern The ANCA IFA is <1:10. No further testing will be performed.       Legend:  (H) High  ! Abnormal

## 2024-12-04 NOTE — LETTER
12/4/2024       RE: Mj Mason .  [address] [place] [address]     Dear Colleague,    Thank you for referring your patient, Mj Mason Jr., to the [facility] RHEUMATOLOGY [facility] [place] at [facility]. Please see a copy of my visit note below.    Virtual Visit Details    Type of service:  Video Visit     Joined the call at 12/4/2024, 3:55:18 pm.  Left the call at 12/4/2024, 4:00:35 pm.    Originating Location (pt. 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 He has previously failed methotrexate, azathioprine, Nucala and, most recently, MMF due to recurrent sinusitis and respiratory symptoms.     11/2022: Specifically, methotrexate 8 tabs weekly was associated with recurrent URI with no improvement in symptoms on 6 tabs weekly. He also then transitioned to AZA in 8/2019 (NL TPMT) but was put on hold in 11/2019 given recurrent sinusitis and antibiotic tx, ultimately requiring sinus surgery/polypectomy on 12/10/2019. He retried AZA 50 mg bid on 3/2020 but had a transient elevated LFTs and improvement of MPO even before resuming AZA. He subsequently trialed Nucala on 2/2022 but discontinued on 9/2022 due to persistent sinusitis and symptoms flares. 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    At this time, the patient is still completing his antibiotic course for  Moraxella sinusitis and would prefer to hold off on further immunosuppression. He does feel that his symptoms are controlled at present on antibiotics and Medrol dose pack. We discussed risks and benefits of not restarting immunosuppression, and he was agreeable to considering a retrial of MMF pending further discussion at follow-up in 3 months. Exam today was otherwise unremarkable, and labs were notable for unremarkable inflammatory markers and ANCA.     3/22/2023: Resumed  mg bid recently, was on it for about 3 wk, but started having sinus sx 2 wk ago, stopped MMF (cellcept), took medrol dose pack, no antibiotic needed this time. Stable labs from 11/2022 with neg ANCA and normal ESR/CRP, no asthma flares and reportedly almost no recurrence of nasal polyps. Feeling well today. Will check labs this month and monitor off MMF for flares. If he continues to flare up with sinus sx, will consider a trial of rituximab. Risks were discussed.       7/19/2023: stable with stable labs (except slight elevation of vasculitis markers), will monitor off tx. Recent labs were reviewed and discussed.    Today's Plan:    Continue follow-up with ENT    Labs (just ANCA, MPO) in 9/2023 and every 3 months      Return in 6 months (video), could be re-scheduled if no complaints    2/15/2024:    Stable ANCA vasculitis off Tx, stable labs. Will continue to monitor sx off tx.    Plan:    Labs at [facility], please fax orders    Return video visit in 6 months      Today 5/15/2024:    EGPA flare responding to high dose steroid taper, I called and spoke with Mj's pulmonologist and we agreed on trying Fasenra next. Lupillo was present during discussion and agreed with the plan. He has tried and failed AZA, MMF, MTX and nucala.     5/15/2024 plan:    Benralizumab (Fasenra) will be ordered by pulmonology, if fails Dupixent will be next    Labs in 2 weeks (will do it after being off steroid)    Keep Aug appointment    12/4/2024:    S/p one  dose of Fasenra 11/25, doing well, stable EGPA, will continue.    Plan:    Labs in 1/2025 at [facility]    Return in 6 months (virtual)    TT 30 min was spent on date of the encounter doing chart review, history and exam, documentation and further activities as noted above. Any prior notes, outside records, laboratory results, and imaging studies were reviewed if relevant.      The longitudinal plan of care for the diagnosis(es)/condition(s) as documented were addressed during this visit. Due to the added complexity in care, I will continue to support Lucio in the subsequent management and with ongoing continuity of care.      Isabella Manley MD      Orders Placed This Encounter   Procedures     AST     ALT     Myeloperoxidase and Proteinase 3 Panel     Albumin level     Creatinine     CRP inflammation     UA with Microscopic reflex to Culture     Protein  random urine     Creatinine random urine     Erythrocyte sedimentation rate auto     CD19 B Cell Count     ANCA IgG by IFA with Reflex to Titer     Immunoglobulins A G and M     CBC with Platelets & Differential        HPI:    8/20/2021: On AZA 50 mg bid since end of 3/2020. Tolerates it well. Reports no recurrence of nasal polyps. He will do follow up with ENT. Labs from 2/2021 are stable. His EGPA seems to be stable. Reports seasonal allergies at this time of the year, using inhalor. Nasal congestion got better by missing AZA x 3 days.    We discussed covid booster vaccine, no official recommendation for J&J receivers yet but most likely will need a booster being immunocompromised.    2/4/2022: Flaring with EGPA, sinus inflammation, increased vasculitis markers. On and off AZA due to sinus infections. Recommend to switch to nucala to control EGPA; risks were discussed. Will treat with prednisone taper.    8/18/2022: Unclear if sinus sx are due to vasculitis and if he fails nucala or not, will get ENT opinion and check labs.    9/23/2022: Labs are stable, but clinically  he continues to flare with sinusitis, nasal polyps, steroid responsive. Since he failed nucala, recommend to stop. Discussed next tx options cellcept versus rituximab. Agreed to try cellcept as safer option next; risks were discussed.    11/30/2022:    Patient reports that he discontinued MMF 2 weeks ago due to worsening congestion, post nasal drip, cough and shortness of breath with subsequent ENT evaluation revealing nasal culture positive sinus infection on 11/11/22. He was started on an unspecified antibiotic on 11/16 and began PRN Medrol dose pack provided by Rheumatology staff with immediate improvement in symptoms. He now continues to have aforementioned symptoms but reduced in severity.    No other acute complaints when seen today. Patient overall reports feeling that symptoms are controlled on antibiotics and steroids and would prefer to wait on restarting MMF or other immunosuppressive regimen at this time.       3/22/2023:     Tried MMF again for about 3wk [place], got recurrence of sinus sx, tried medrol dose pack and stopped cellcept, this was 2 wk ago, medrol helped.    Asthma is well controlled on Symbicort inh only, has not required rescue inhaler.     No new sx of rash, neuropathy, joint pain.    Feeling well today.    7/19/2023:    -Sometimes sinuses feels plugged on especially with moaning the lawn, uses Symbicort once-twice a day, uses rescue inhaler less often.    -No more steroid use since last visit    -No recent infection needing antibiotic        History of Presenting Illness    2/4/2022: Had 2 sinusitis, went on 2 rounds of antibiotics. Had 3 different bacteria on the culture. Off antibiotic x 2 weeks, resumed AZA x past 2 weeks. Still gets PND and coughs a lot. ENT saw inflammation on exam with no infection, this was about 2.5 wk ago when he was recommended to resume AZA. Was off AZA from 9/2021 till two weeks ago.  1st and 2nd round of antibiotic, did not  come off AZA, just with 3rd round. Has some SOB, uses inhalers more. Gets winded more with exercise.     8/19/2022: His nose gets stuffy on lying down. Saw ENT and was given prednisone and it helped some, but cough and congestion is coming back off prednisone. Took last prednisone last week. It was 40 mg every day max. Seasonal allergies are triggers. Sometimes it is harder to breathe when he is working out.     9/23/2022: Lucio presents for follow up, continues to have flares of sinusitis with nasal polyps despite being on nucala, which are steroid responsive.    Mj Portillodennis Brown. 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    He is doing fairly well. No recurrence of nasal polyps on imuran. Last seen by ENT in 12/2020, has upcoming appointment. Reports having seasonal allergies at this time of the year, has been using his inhalers x past 2 weeks. Reports some nasal congestion, it was better when he ran out of AZA x 3 days early this week.    He sleeps at the side, has block nostril, was better on medrol dose pack.    Medrol dose pack finished yesterday. Last antibiotic was a month ago. Medrol works better, prednisone does not help.    Uses inhaler to prevent asthma.    No joint pain or skin rash.    Had surgery for R shoulder rotator cuff full tear which was successful.    Had J&J covid vaccine on 4/8/2021, tolerated it well.        2/15/2024:      Was feeling stuffy due to allergies around Thanksgiving; otherwise no flares of vasculitis, no nasal polyps, no infections      About 1.5 months ago, started having more coughing with more mucous, harder to breath wit hwork out, then started using inhlaer, same as Nov nothin gfdifferent pollens    Went and had polyps checked, minimal nasal polyps but they have been stable, not as bad as years ago,     Always gets congested     5/15/2024:    Presents with EGPA flare, managed by pulmonary    Prednisone 60 mg every day-40-20 mg a day, 1 wk after  steroid, cough resolved and went away    Cough is coming back little bit now     Will be down on prednisone 10 mg qd tomorrow    Sense of smell is back    No skin rash, joint pain, foot or wrist drop, numbness/tingling    Does work out      Today 12/4/2024:    S/p one dose of Fasenra 11/25, doing well, stable EGPA    Has not required recent steroid or antibiotics    Does work out    Family History   Problem Relation Age of Onset     Colon Cancer Maternal Grandmother      Stomach Cancer Paternal Grandmother      No Known Problems Mother      No Known Problems Father      No Known Problems Maternal Grandfather      No Known Problems Paternal Grandfather      No Known Problems Brother      No Known Problems Sister      No Known Problems Son      No Known Problems Daughter      No Known Problems Maternal Half-Brother      No Known Problems Maternal Half-Sister      No Known Problems Paternal Half-Brother      No Known Problems Paternal Half-Sister      No Known Problems Niece      No Known Problems Nephew      No Known Problems Cousin      No Known Problems Other      Cancer No family hx of      Diabetes No family hx of      Heart Disease No family hx of      Hypertension No family hx of      Cerebrovascular Disease No family hx of      Asthma No family hx of      Thyroid Disease No family hx of      Depression No family hx of      Mental Illness No family hx of      Substance Abuse No family hx of      Dementia No family hx of      Bleeding Disorder No family hx of      Congenital Anomalies No family hx of      Migraines No family hx of      Stomach Problem No family hx of      Muscular Disorder No family hx of      Osteoporosis No family hx of      Unknown/Adopted No family hx of      Social History     Socioeconomic History     Marital status:      Spouse name: Not on file     Number of children: 2     Years of education: Not on file     Highest education level: Not on file   Occupational History      Occupation:  manager   Tobacco Use     Smoking status: Never     Passive exposure: Past     Smokeless tobacco: Never   Substance and Sexual Activity     Alcohol use: Yes     Drug use: Never     Sexual activity: Not on file   Other Topics Concern     Not on file   Social History Narrative     Not on file     Social Drivers of Health     Financial Resource Strain: High Risk (12/22/2021)    [email or web address]    Financial Resource Strain      Difficulty of Paying Living Expenses: Not on file      Difficulty of Paying Living Expenses: Not on file   Food Insecurity: Not on file   Transportation Needs: Not on file   Physical Activity: Not on file   Stress: Not on file   Social Connections: Unknown (12/22/2021)    [facility] from [employer] [facility], [facility]    Social Connections      Frequency of Communication with Friends and Family: Not on file   Interpersonal Safety: Not on file   Housing Stability: Not on file       No Known Allergies    Outpatient Encounter Medications as of 12/4/2024   Medication Sig Dispense Refill     albuterol (PROAIR HFA/PROVENTIL HFA/VENTOLIN HFA) 108 (90 Base) MCG/ACT inhaler as needed        benralizumab (FASENRA) 30 MG/ML SOAJ auto-injector pen Inject 30 mg subcutaneously. 30 mg every 4 weeks for the first 3 doses, then once every 8 weeks       budesonide (PULMICORT) 0.5 MG/2ML neb solution        FLOVENT  MCG/ACT inhaler Inhale 1 puff into the lungs daily       levocetirizine (XYZAL) 5 MG tablet Take 5 mg by mouth every morning (Patient not taking: Reported on 3/22/2023)       methylPREDNISolone (MEDROL DOSEPAK) 4 MG tablet therapy pack Take per package instructions. As needed for flare up (Patient not taking: Reported on 3/22/2023) 21 tablet 3     montelukast (SINGULAIR) 10 MG tablet Take 10 mg by mouth At Bedtime        SYMBICORT  "160-4.5 MCG/ACT Inhaler Inhale 2 puffs into the lungs 2 times daily        No facility-administered encounter medications on file as of 12/4/2024.       ROS:  A comprehensive ROS was done, positives are per HPI.    Ph.E:    Ht 1.727 m (5' 8\")   Wt 87.1 kg (192 lb)   BMI 29.19 kg/m      Constitutional: WD/WN. Pleasant. In no acute distress. Sounds congested  HEENT: EOM intact, sclera anicteric, conj not injected. No saddle nose deformity  MS: No synovitis   Skin: No skin rash  Neuro: A&O x 3  Psych: NL affect     His records were reviewed.    4/2019: pos MPO, p-ANCA    7/2019: NL ESR/CRP    Component      Latest Ref Rng & Units 3/25/2020   WBC      4.0 - 11.0 10e9/L 3.8 (L)   RBC Count      4.4 - 5.9 10e12/L 4.53   Hemoglobin      13.3 - 17.7 g/dL 15.1   Hematocrit      40.0 - 53.0 % 45.3   MCV      78 - 100 fl 100   MCH      26.5 - 33.0 pg 33.3 (H)   MCHC      31.5 - 36.5 g/dL 33.3   RDW      10.0 - 15.0 % 13.0   Platelet Count      150 - 450 10e9/L 235   Diff Method       Automated Method   % Neutrophils      % 43.3   % Lymphocytes      % 41.0   % Monocytes      % 9.0   % Eosinophils      % 4.8   % Basophils      % 1.6   % Immature Granulocytes      % 0.3   Nucleated RBCs      0 /100 0   Absolute Neutrophil      1.6 - 8.3 10e9/L 1.6   Absolute Lymphocytes      0.8 - 5.3 10e9/L 1.5   Absolute Monocytes      0.0 - 1.3 10e9/L 0.3   Absolute Eosinophils      0.0 - 0.7 10e9/L 0.2   Absolute Basophils      0.0 - 0.2 10e9/L 0.1   Abs Immature Granulocytes      0 - 0.4 10e9/L 0.0   Absolute Nucleated RBC       0.0   Sodium      133 - 144 mmol/L 140   Potassium      3.4 - 5.3 mmol/L 3.6   Chloride      94 - 109 mmol/L 106   Carbon Dioxide      20 - 32 mmol/L 32   Anion Gap      3 - 14 mmol/L 2 (L)   Glucose      70 - 99 mg/dL 53 (L)   Urea Nitrogen      7 - 30 mg/dL 22   Creatinine      0.66 - 1.25 mg/dL 0.98   GFR Estimate      >60 mL/min/1.73:m2 >90   GFR Estimate If Black      >60 mL/min/1.73:m2 >90   Calcium      8.5 " - 10.1 mg/dL 9.1   Bilirubin Total      0.2 - 1.3 mg/dL 0.6   Albumin      3.4 - 5.0 g/dL 4.4   Protein Total      6.8 - 8.8 g/dL 7.5   Alkaline Phosphatase      40 - 150 U/L 52   ALT      0 - 70 U/L 45   AST      0 - 45 U/L 28   Color Urine       Yellow   Appearance Urine       Clear   Glucose Urine      NEG:Negative mg/dL Negative   Bilirubin Urine      NEG:Negative Negative   Ketones Urine      NEG:Negative mg/dL Negative   Specific Gravity Urine      1.003 - 1.035 1.006   Blood Urine      NEG:Negative Negative   pH Urine      5.0 - 7.0 pH 7.0   Protein Albumin Urine      NEG:Negative mg/dL Negative   Urobilinogen mg/dL      0.0 - 2.0 mg/dL 0.0   Nitrite Urine      NEG:Negative Negative   Leukocyte Esterase Urine      NEG:Negative Negative   Source       Midstream Urine   WBC Urine      0 - 5 /HPF 0   RBC Urine      0 - 2 /HPF 0   Squamous Epithelial /HPF Urine      0 - 1 /HPF <1   Neutrophil Cytoplasmic Antibody      <1:10 titer <1:10   Neutrophil Cytoplasmic Antibody Pattern       The ANCA IFA is <1:10.  No further testing will be performed.   Protein Random Urine      g/L <0.05   Protein Total Urine g/gr Creatinine      0 - 0.2 g/g Cr Unable to calculate due to low value   Myeloperoxidase Antibody IgG      0.0 - 0.9 AI 4.7 (H)   Proteinase 3 Antibody IgG      0.0 - 0.9 AI <0.2   IGE      0 - 114 KIU/L 22   CRP Inflammation      0.0 - 8.0 mg/L <2.9   Sed Rate      0 - 15 mm/h 5   Creatinine Urine      mg/dL 24     Stable labs in 2/2021    Component      Latest Ref Rng & Units 11/16/2021   Color Urine      Colorless, Straw, Light Yellow, Yellow Yellow   Appearance Urine      Clear Clear   Glucose Urine      Negative mg/dL Negative   Bilirubin Urine      Negative Negative   Ketones Urine      Negative mg/dL Negative   Specific Gravity Urine      1.003 - 1.035 1.009   Blood Urine      Negative Negative   pH Urine      5.0 - 7.0 6.0   Protein Albumin Urine      Negative mg/dL Negative   Urobilinogen mg/dL       Normal, 2.0 mg/dL Normal   Nitrite Urine      Negative Negative   Leukocyte Esterase Urine      Negative Negative   RBC Urine      <=2 /HPF <1   WBC Urine      <=5 /HPF 0   MPO Zenaida IgG Instrument Value      <3.5 U/mL 17.0 (H)   Myeloperoxidase Antibody IgG      Negative Positive (A)   Proteinase 3 Zenaida IgG Instrument Value      <2.0 U/mL <1.0   Proteinase 3 Antibody IgG      Negative Negative   Protein Random Urine      g/L <0.05   Protein Total Urine g/gr Creatinine          Creatinine Urine      mg/dL 37   IGG      610-1,616 mg/dL 1,053   IGA      84 - 499 mg/dL 178   IGM      35 - 242 mg/dL 28 (L)   Creatinine      0.66 - 1.25 mg/dL 0.92   GFR Estimate      >60 mL/min/1.73m2 >90   CD19 B Cells      6 - 27 % 7   Absolute CD19      107 - 698 cells/uL 92 (L)   Neutrophil Cytoplasmic Antibody      <1:10 <1:10   Neutrophil Cytoplasmic Antibody Pattern       The ANCA IFA is <1:10.  No further testing will be performed.   AST      0 - 45 U/L 34   ALT      0 - 70 U/L 46   Albumin      3.4 - 5.0 g/dL 3.8   CRP Inflammation      0.0 - 8.0 mg/L <2.9   Sed Rate      0 - 15 mm/hr 10     Component      Latest Ref Rng & Units 2/1/2022   Color Urine      Colorless, Straw, Light Yellow, Yellow Yellow   Appearance Urine      Clear Clear   Glucose Urine      Negative mg/dL Negative   Bilirubin Urine      Negative Negative   Ketones Urine      Negative mg/dL Negative   Specific Gravity Urine      1.003 - 1.035 1.004   Blood Urine      Negative Negative   pH Urine      5.0 - 7.0 6.0   Protein Albumin Urine      Negative mg/dL Negative   Urobilinogen mg/dL      Normal, 2.0 mg/dL Normal   Nitrite Urine      Negative Negative   Leukocyte Esterase Urine      Negative Negative   RBC Urine      <=2 /HPF <1   WBC Urine      <=5 /HPF <1   MPO Zenaida IgG Instrument Value      <3.5 U/mL    Myeloperoxidase Antibody IgG      Negative    Proteinase 3 Zenaida IgG Instrument Value      <2.0 U/mL    Proteinase 3 Antibody IgG      Negative    Protein Random  Urine      g/L <0.05   Protein Total Urine g/gr Creatinine          Creatinine Urine      mg/dL 24   IGG      610-1,616 mg/dL 1,146   IGA      84 - 499 mg/dL 168   IGM      35 - 242 mg/dL 30 (L)   Creatinine      0.66 - 1.25 mg/dL 1.06   GFR Estimate      >60 mL/min/1.73m2 89   CD19 B Cells      6 - 27 % 7   Absolute CD19      107 - 698 cells/uL 116   Neutrophil Cytoplasmic Antibody      <1:10 1:160 (H)   Neutrophil Cytoplasmic Antibody Pattern       Perinuclear ANCA (p-ANCA) staining pattern observed and confirmed on formalin-fixed neutrophils.   AST      0 - 45 U/L 38   ALT      0 - 70 U/L 40   Albumin      3.4 - 5.0 g/dL 3.7   CRP Inflammation      0.0 - 8.0 mg/L 7.4   Sed Rate      0 - 15 mm/hr 13     Component      Latest Ref Rng & Units 2/1/2022   WBC      4.0 - 11.0 10e3/uL 5.5   RBC Count      4.40 - 5.90 10e6/uL 4.39 (L)   Hemoglobin      13.3 - 17.7 g/dL 14.9   Hematocrit      40.0 - 53.0 % 42.8   MCV      78 - 100 fL 98   MCH      26.5 - 33.0 pg 33.9 (H)   MCHC      31.5 - 36.5 g/dL 34.8   RDW      10.0 - 15.0 % 12.2   Platelet Count      150 - 450 10e3/uL 257   % Neutrophils      % 54   % Lymphocytes      % 25   % Monocytes      % 10   % Eosinophils      % 9   % Basophils      % 2   % Immature Granulocytes      % 0   NRBCs per 100 WBC      <1 /100 0   Absolute Neutrophils      1.6 - 8.3 10e3/uL 3.0   Absolute Lymphocytes      0.8 - 5.3 10e3/uL 1.3   Absolute Monocytes      0.0 - 1.3 10e3/uL 0.6   Absolute Eosinophils      0.0 - 0.7 10e3/uL 0.5   Absolute Basophils      0.0 - 0.2 10e3/uL 0.1   Absolute Immature Granulocytes      <=0.4 10e3/uL 0.0   Absolute NRBCs      10e3/uL 0.0   Color Urine      Colorless, Straw, Light Yellow, Yellow Yellow   Appearance Urine      Clear Clear   Glucose Urine      Negative mg/dL Negative   Bilirubin Urine      Negative Negative   Ketones Urine      Negative mg/dL Negative   Specific Gravity Urine      1.003 - 1.035 1.004   Blood Urine      Negative Negative   pH Urine       5.0 - 7.0 6.0   Protein Albumin Urine      Negative mg/dL Negative   Urobilinogen mg/dL      Normal, 2.0 mg/dL Normal   Nitrite Urine      Negative Negative   Leukocyte Esterase Urine      Negative Negative   RBC Urine      <=2 /HPF <1   WBC Urine      <=5 /HPF <1   MPO Zenaida IgG Instrument Value      <3.5 U/mL 102.0 (H)   Myeloperoxidase Antibody IgG      Negative Positive (A)   Proteinase 3 Zenaida IgG Instrument Value      <2.0 U/mL <1.0   Proteinase 3 Antibody IgG      Negative Negative   IGG      610-1,616 mg/dL 1,146   IGA      84 - 499 mg/dL 168   IGM      35 - 242 mg/dL 30 (L)   Protein Random Urine      g/L <0.05   Protein Total Urine g/gr Creatinine          Creatinine Urine      mg/dL 24   Neutrophil Cytoplasmic Antibody      <1:10 1:160 (H)   Neutrophil Cytoplasmic Antibody Pattern       Perinuclear ANCA (p-ANCA) staining pattern observed and confirmed on formalin-fixed neutrophils.   CD19 B Cells      6 - 27 % 7   Absolute CD19      107 - 698 cells/uL 116   Creatinine      0.66 - 1.25 mg/dL 1.06   GFR Estimate      >60 mL/min/1.73m2 89   Sed Rate      0 - 15 mm/hr 13   CRP Inflammation      0.0 - 8.0 mg/L 7.4   Albumin      3.4 - 5.0 g/dL 3.7   ALT      0 - 70 U/L 40   AST      0 - 45 U/L 38     Component      Latest Ref Rng & Units 9/12/2022   WBC      4.0 - 11.0 10e3/uL 5.0   RBC Count      4.40 - 5.90 10e6/uL 4.50   Hemoglobin      13.3 - 17.7 g/dL 15.0   Hematocrit      40.0 - 53.0 % 43.5   MCV      78 - 100 fL 97   MCH      26.5 - 33.0 pg 33.3 (H)   MCHC      31.5 - 36.5 g/dL 34.5   RDW      10.0 - 15.0 % 12.5   Platelet Count      150 - 450 10e3/uL 221   % Neutrophils      % 49   % Lymphocytes      % 36   % Monocytes      % 12   % Eosinophils      % 1   % Basophils      % 1   % Immature Granulocytes      % 1   NRBCs per 100 WBC      <1 /100 0   Absolute Neutrophils      1.6 - 8.3 10e3/uL 2.4   Absolute Lymphocytes      0.8 - 5.3 10e3/uL 1.8   Absolute Monocytes      0.0 - 1.3 10e3/uL 0.6   Absolute  Eosinophils      0.0 - 0.7 10e3/uL 0.0   Absolute Basophils      0.0 - 0.2 10e3/uL 0.1   Absolute Immature Granulocytes      <=0.4 10e3/uL 0.0   Absolute NRBCs      10e3/uL 0.0   Color Urine      Colorless, Straw, Light Yellow, Yellow Yellow   Appearance Urine      Clear Clear   Glucose Urine      Negative mg/dL Negative   Bilirubin Urine      Negative Negative   Ketones Urine      Negative mg/dL Negative   Specific Gravity Urine      1.003 - 1.035 1.005   Blood Urine      Negative Negative   pH Urine      5.0 - 7.0 5.0   Protein Albumin Urine      Negative mg/dL Negative   Urobilinogen mg/dL      Normal, 2.0 mg/dL Normal   Nitrite Urine      Negative Negative   Leukocyte Esterase Urine      Negative Negative   RBC Urine      <=2 /HPF 0   WBC Urine      <=5 /HPF 0   MPO Zenaida IgG Instrument Value      <3.5 U/mL 8.0 (H)   Myeloperoxidase Antibody IgG      Negative Positive (A)   Proteinase 3 Zenaida IgG Instrument Value      <2.0 U/mL <1.0   Proteinase 3 Antibody IgG      Negative Negative   Total Protein Urine mg/dL      mg/dL <6.0   Total Protein UR MG/MG CR          Creatinine Urine      mg/dL 20.3   IGG      610 - 1,616 mg/dL 906   IGA      84 - 499 mg/dL 160   IGM      35 - 242 mg/dL 32 (L)   Creatinine      0.67 - 1.17 mg/dL 1.06   GFR Estimate      >60 mL/min/1.73m2 89   CD19 B Cells      6 - 27 % 8   Absolute CD19      107 - 698 cells/uL 151   Neutrophil Cytoplasmic Antibody      <1:10 <1:10   Neutrophil Cytoplasmic Antibody Pattern       The ANCA IFA is <1:10.  No further testing will be performed.   AST      10 - 50 U/L 36   ALT      10 - 50 U/L 35   Albumin      3.5 - 5.2 g/dL 4.5   CRP Inflammation      <5.00 mg/L <3.00   Sed Rate      0 - 15 mm/hr 6     Component      Latest Ref Rng & Units 11/1/2022   WBC      4.0 - 11.0 10e3/uL 6.4   RBC Count      4.40 - 5.90 10e6/uL 4.44   Hemoglobin      13.3 - 17.7 g/dL 14.8   Hematocrit      40.0 - 53.0 % 42.7   MCV      78 - 100 fL 96   MCH      26.5 - 33.0 pg 33.3 (H)    MCHC      31.5 - 36.5 g/dL 34.7   RDW      10.0 - 15.0 % 12.5   Platelet Count      150 - 450 10e3/uL 247   % Neutrophils      % 62   % Lymphocytes      % 26   % Monocytes      % 9   % Eosinophils      % 2   % Basophils      % 1   % Immature Granulocytes      % 0   NRBCs per 100 WBC      <1 /100 0   Absolute Neutrophils      1.6 - 8.3 10e3/uL 4.0   Absolute Lymphocytes      0.8 - 5.3 10e3/uL 1.7   Absolute Monocytes      0.0 - 1.3 10e3/uL 0.6   Absolute Eosinophils      0.0 - 0.7 10e3/uL 0.1   Absolute Basophils      0.0 - 0.2 10e3/uL 0.1   Absolute Immature Granulocytes      <=0.4 10e3/uL 0.0   Absolute NRBCs      10e3/uL 0.0   Color Urine      Colorless, Straw, Light Yellow, Yellow Straw   Appearance Urine      Clear Clear   Glucose Urine      Negative mg/dL Negative   Bilirubin Urine      Negative Negative   Ketones Urine      Negative mg/dL Negative   Specific Gravity Urine      1.003 - 1.035 1.010   Blood Urine      Negative Negative   pH Urine      5.0 - 7.0 6.0   Protein Albumin Urine      Negative mg/dL Negative   Urobilinogen mg/dL      Normal, 2.0 mg/dL Normal   Nitrite Urine      Negative Negative   Leukocyte Esterase Urine      Negative Negative   RBC Urine      <=2 /HPF 0   WBC Urine      <=5 /HPF <1   MPO Zenaida IgG Instrument Value      <3.5 U/mL 1.5   Myeloperoxidase Antibody IgG      Negative Negative   Proteinase 3 Zenaida IgG Instrument Value      <2.0 U/mL <1.0   Proteinase 3 Antibody IgG      Negative Negative   Total Protein Urine mg/dL      mg/dL <6.0   Total Protein UR MG/MG CR          Creatinine Urine      mg/dL 36.7   IGG      610 - 1,616 mg/dL 948   IGA      84 - 499 mg/dL 154   IGM      35 - 242 mg/dL 27 (L)   Creatinine      0.67 - 1.17 mg/dL 0.97   GFR Estimate      >60 mL/min/1.73m2 >90   CD19 B Cells      6 - 27 % 13   Absolute CD19      107 - 698 cells/uL 240   Neutrophil Cytoplasmic Antibody      <1:10 <1:10   Neutrophil Cytoplasmic Antibody Pattern       The ANCA IFA is <1:10.  No  further testing will be performed.   AST      10 - 50 U/L 44   ALT      10 - 50 U/L 37   Albumin      3.5 - 5.2 g/dL 4.6   CRP Inflammation      <5.00 mg/L <3.00   Sed Rate      0 - 15 mm/hr 6     Component      Latest Ref Rng 6/6/2023  11:57 AM   WBC      4.0 - 11.0 10e3/uL 6.3    RBC Count      4.40 - 5.90 10e6/uL 4.22 (L)    Hemoglobin      13.3 - 17.7 g/dL 14.0    Hematocrit      40.0 - 53.0 % 40.6    MCV      78 - 100 fL 96    MCH      26.5 - 33.0 pg 33.2 (H)    MCHC      31.5 - 36.5 g/dL 34.5    RDW      10.0 - 15.0 % 12.3    Platelet Count      150 - 450 10e3/uL 325    % Neutrophils      % 58    % Lymphocytes      % 26    % Monocytes      % 8    % Eosinophils      % 6    % Basophils      % 2    % Immature Granulocytes      % 0    NRBCs per 100 WBC      <1 /100 0    Absolute Neutrophils      1.6 - 8.3 10e3/uL 3.7    Absolute Lymphocytes      0.8 - 5.3 10e3/uL 1.7    Absolute Monocytes      0.0 - 1.3 10e3/uL 0.5    Absolute Eosinophils      0.0 - 0.7 10e3/uL 0.4    Absolute Basophils      0.0 - 0.2 10e3/uL 0.1    Absolute Immature Granulocytes      <=0.4 10e3/uL 0.0    Absolute NRBCs      10e3/uL 0.0    Color Urine      Colorless, Straw, Light Yellow, Yellow     Appearance Urine      Clear     Glucose Urine      Negative mg/dL    Bilirubin Urine      Negative     Ketones Urine      Negative mg/dL    Specific Gravity Urine      1.003 - 1.035     Blood Urine      Negative     pH Urine      5.0 - 7.0     Protein Albumin Urine      Negative mg/dL    Urobilinogen mg/dL      Normal, 2.0 mg/dL    Nitrite Urine      Negative     Leukocyte Esterase Urine      Negative     RBC Urine      <=2 /HPF    WBC Urine      <=5 /HPF    MPO Zenaida IgG Instrument Value      <3.5 U/mL 12.0 (H)    Myeloperoxidase Antibody IgG      Negative  Positive !    Proteinase 3 Zenaida IgG Instrument Value      <2.0 U/mL <1.0    Proteinase 3 Antibody IgG      Negative  Negative    Total Protein Urine mg/dL      1.0 - 14.0 mg/dL    Total Protein UR  MG/MG CR    Creatinine Urine      mg/dL    CD19 B Cells      6 - 27 % 11    Absolute CD19      107 - 698 cells/uL 182    IGG      610 - 1,616 mg/dL 1,204    IGA      84 - 499 mg/dL 183    IGM      35 - 242 mg/dL 49    Creatinine      0.67 - 1.17 mg/dL 0.89    GFR Estimate      >60 mL/min/1.73m2 >90    Neutrophil Cytoplasmic Antibody      <1:10  1:10 (H)    Neutrophil Cytoplasmic Antibody Pattern Cytoplasmic ANCA (c-ANCA) staining pattern observed and confirmed on formalin-fixed neutrophils.    AST      10 - 50 U/L 41    ALT      10 - 50 U/L 47    Albumin      3.5 - 5.2 g/dL 4.3    CRP Inflammation      <5.00 mg/L 4.24    Sed Rate      0 - 15 mm/hr 18 (H)      Component      Latest Ref [facility] 6/6/2023  12:00 PM   WBC      4.0 - 11.0 10e3/uL    RBC Count      4.40 - 5.90 10e6/uL    Hemoglobin      13.3 - 17.7 g/dL    Hematocrit      40.0 - 53.0 %    MCV      78 - 100 fL    MCH      26.5 - 33.0 pg    MCHC      31.5 - 36.5 g/dL    RDW      10.0 - 15.0 %    Platelet Count      150 - 450 10e3/uL    % Neutrophils      %    % Lymphocytes      %    % Monocytes      %    % Eosinophils      %    % Basophils      %    % Immature Granulocytes      %    NRBCs per 100 WBC      <1 /100    Absolute Neutrophils      1.6 - 8.3 10e3/uL    Absolute Lymphocytes      0.8 - 5.3 10e3/uL    Absolute Monocytes      0.0 - 1.3 10e3/uL    Absolute Eosinophils      0.0 - 0.7 10e3/uL    Absolute Basophils      0.0 - 0.2 10e3/uL    Absolute Immature Granulocytes      <=0.4 10e3/uL    Absolute NRBCs      10e3/uL    Color Urine      Colorless, Straw, Light Yellow, Yellow  Straw    Appearance Urine      Clear  Clear    Glucose Urine      Negative mg/dL Negative    Bilirubin Urine      Negative  Negative    Ketones Urine      Negative mg/dL Negative    Specific Gravity Urine      1.003 - 1.035  1.009    Blood Urine      Negative  Negative    pH Urine      5.0 - 7.0  5.5    Protein Albumin Urine      Negative mg/dL Negative    Urobilinogen mg/dL       Normal, 2.0 mg/dL Normal    Nitrite Urine      Negative  Negative    Leukocyte Esterase Urine      Negative  Negative    RBC Urine      <=2 /HPF <1    WBC Urine      <=5 /HPF <1    MPO Zenaida IgG Instrument Value      <3.5 U/mL    Myeloperoxidase Antibody IgG      Negative     Proteinase 3 Zenaida IgG Instrument Value      <2.0 U/mL    Proteinase 3 Antibody IgG      Negative     Total Protein Urine mg/dL      1.0 - 14.0 mg/dL <6.0    Total Protein UR MG/MG CR --    Creatinine Urine      mg/dL 27.9    CD19 B Cells      6 - 27 %    Absolute CD19      107 - 698 cells/uL    IGG      610 - 1,616 mg/dL    IGA      84 - 499 mg/dL    IGM      35 - 242 mg/dL    Creatinine      0.67 - 1.17 mg/dL    GFR Estimate      >60 mL/min/1.73m2    Neutrophil Cytoplasmic Antibody      <1:10     Neutrophil Cytoplasmic Antibody Pattern    AST      10 - 50 U/L    ALT      10 - 50 U/L    Albumin      3.5 - 5.2 g/dL    CRP Inflammation      <5.00 mg/L    Sed Rate      0 - 15 mm/hr       Legend:  (L) Low  (H) High  ! Abnormal  Component      Latest Ref Rn 10/11/2023  11:32 AM   MPO Zenaida IgG Instrument Value      <3.5 U/mL 12.0 (H)    Myeloperoxidase Antibody IgG      Negative  Positive !    Proteinase 3 Zenaida IgG Instrument Value      <2.0 U/mL <1.0    Proteinase 3 Antibody IgG      Negative  Negative    Neutrophil Cytoplasmic Antibody      <1:10  <1:10    Neutrophil Cytoplasmic Antibody Pattern The ANCA IFA is <1:10. No further testing will be performed.       Legend:  (H) High  ! Abnormal      Again, thank you for allowing me to participate in the care of your patient.      Sincerely,    Isabella Manley MD

## 2024-12-04 NOTE — NURSING NOTE
Current patient location:  [address] [place] [address]     Is the patient currently in the state of [place]? YES    Visit mode:VIDEO    If the visit is dropped, the patient can be reconnected by:VIDEO VISIT: Text to cell phone:   Telephone Information:   Mobile [phone]       Will anyone else be joining the visit? NO  (If patient encounters technical issues they should call [phone]    Are changes needed to the allergy or medication list? Pt completed echeck-in an confirms medications and allergies are correct.     Are refills needed on medications prescribed by this physician? NO    Rooming Documentation:  Questionnaire(s) completed    Reason for visit: JUSTUS JENKINS

## 2025-01-26 ENCOUNTER — HEALTH MAINTENANCE LETTER (OUTPATIENT)
Age: 45
End: 2025-01-26

## 2025-01-30 ENCOUNTER — LAB (OUTPATIENT)
Dept: LAB | Facility: CLINIC | Age: 45
End: 2025-01-30
Payer: COMMERCIAL

## 2025-01-30 DIAGNOSIS — M30.1 EOSINOPHILIC GRANULOMATOSIS WITH POLYANGIITIS (EGPA) (H): ICD-10-CM

## 2025-01-30 DIAGNOSIS — D72.18 EOSINOPHILIC GRANULOMATOSIS WITH POLYANGIITIS (EGPA) (H): ICD-10-CM

## 2025-01-30 DIAGNOSIS — J45.909 ASTHMA, STABLE, UNSPECIFIED ASTHMA SEVERITY: ICD-10-CM

## 2025-01-30 DIAGNOSIS — J31.0 CHRONIC RHINITIS: ICD-10-CM

## 2025-01-30 DIAGNOSIS — Z51.81 MEDICATION MONITORING ENCOUNTER: ICD-10-CM

## 2025-01-30 DIAGNOSIS — I77.82 ANCA-ASSOCIATED VASCULITIS (H): Primary | ICD-10-CM

## 2025-01-30 DIAGNOSIS — D72.19 PERIPHERAL EOSINOPHILIA: ICD-10-CM

## 2025-01-30 LAB
ALBUMIN MFR UR ELPH: <6 MG/DL
ALBUMIN SERPL BCG-MCNC: 4.3 G/DL (ref 3.5–5.2)
ALBUMIN UR-MCNC: NEGATIVE MG/DL
ALT SERPL W P-5'-P-CCNC: 27 U/L (ref 0–70)
APPEARANCE UR: CLEAR
AST SERPL W P-5'-P-CCNC: 62 U/L (ref 0–45)
BASOPHILS # BLD AUTO: 0 10E3/UL (ref 0–0.2)
BASOPHILS NFR BLD AUTO: 0 %
BILIRUB UR QL STRIP: NEGATIVE
CD19 B CELL COMMENT: ABNORMAL
CD19 CELLS # BLD: 55 CELLS/UL (ref 107–698)
CD19 CELLS NFR BLD: 9 % (ref 6–27)
COLOR UR AUTO: NORMAL
CREAT SERPL-MCNC: 1.01 MG/DL (ref 0.67–1.17)
CREAT UR-MCNC: 14.1 MG/DL
CRP SERPL-MCNC: 121 MG/L
EGFRCR SERPLBLD CKD-EPI 2021: >90 ML/MIN/1.73M2
EOSINOPHIL # BLD AUTO: 0 10E3/UL (ref 0–0.7)
EOSINOPHIL NFR BLD AUTO: 0 %
ERYTHROCYTE [DISTWIDTH] IN BLOOD BY AUTOMATED COUNT: 12.4 % (ref 10–15)
ERYTHROCYTE [SEDIMENTATION RATE] IN BLOOD BY WESTERGREN METHOD: 33 MM/HR (ref 0–15)
GLUCOSE UR STRIP-MCNC: NEGATIVE MG/DL
HCT VFR BLD AUTO: 40.1 % (ref 40–53)
HGB BLD-MCNC: 13.8 G/DL (ref 13.3–17.7)
HGB UR QL STRIP: NEGATIVE
IMM GRANULOCYTES # BLD: 0 10E3/UL
IMM GRANULOCYTES NFR BLD: 0 %
KETONES UR STRIP-MCNC: NEGATIVE MG/DL
LEUKOCYTE ESTERASE UR QL STRIP: NEGATIVE
LYMPHOCYTES # BLD AUTO: 0.6 10E3/UL (ref 0.8–5.3)
LYMPHOCYTES NFR BLD AUTO: 11 %
MCH RBC QN AUTO: 32.5 PG (ref 26.5–33)
MCHC RBC AUTO-ENTMCNC: 34.4 G/DL (ref 31.5–36.5)
MCV RBC AUTO: 94 FL (ref 78–100)
MONOCYTES # BLD AUTO: 0.3 10E3/UL (ref 0–1.3)
MONOCYTES NFR BLD AUTO: 5 %
NEUTROPHILS # BLD AUTO: 4.3 10E3/UL (ref 1.6–8.3)
NEUTROPHILS NFR BLD AUTO: 83 %
NITRATE UR QL: NEGATIVE
NRBC # BLD AUTO: 0 10E3/UL
NRBC BLD AUTO-RTO: 0 /100
PH UR STRIP: 5.5 [PH] (ref 5–7)
PLATELET # BLD AUTO: 202 10E3/UL (ref 150–450)
PROT/CREAT 24H UR: NORMAL MG/G{CREAT}
RBC # BLD AUTO: 4.25 10E6/UL (ref 4.4–5.9)
RBC URINE: <1 /HPF
SP GR UR STRIP: 1 (ref 1–1.03)
UROBILINOGEN UR STRIP-MCNC: NORMAL MG/DL
WBC # BLD AUTO: 5.2 10E3/UL (ref 4–11)
WBC URINE: <1 /HPF

## 2025-01-30 PROCEDURE — 86036 ANCA SCREEN EACH ANTIBODY: CPT | Performed by: INTERNAL MEDICINE

## 2025-01-30 PROCEDURE — 86355 B CELLS TOTAL COUNT: CPT | Performed by: INTERNAL MEDICINE

## 2025-01-30 PROCEDURE — 84450 TRANSFERASE (AST) (SGOT): CPT | Performed by: PATHOLOGY

## 2025-01-30 PROCEDURE — 85025 COMPLETE CBC W/AUTO DIFF WBC: CPT | Performed by: PATHOLOGY

## 2025-01-30 PROCEDURE — 86140 C-REACTIVE PROTEIN: CPT | Performed by: PATHOLOGY

## 2025-01-30 PROCEDURE — 82040 ASSAY OF SERUM ALBUMIN: CPT | Performed by: PATHOLOGY

## 2025-01-30 PROCEDURE — 81001 URINALYSIS AUTO W/SCOPE: CPT | Performed by: PATHOLOGY

## 2025-01-30 PROCEDURE — 85652 RBC SED RATE AUTOMATED: CPT | Performed by: PATHOLOGY

## 2025-01-30 PROCEDURE — 82565 ASSAY OF CREATININE: CPT | Performed by: PATHOLOGY

## 2025-01-30 PROCEDURE — 99000 SPECIMEN HANDLING OFFICE-LAB: CPT | Performed by: PATHOLOGY

## 2025-01-30 PROCEDURE — 84156 ASSAY OF PROTEIN URINE: CPT | Performed by: PATHOLOGY

## 2025-01-30 PROCEDURE — 82784 ASSAY IGA/IGD/IGG/IGM EACH: CPT | Performed by: INTERNAL MEDICINE

## 2025-01-30 PROCEDURE — 82785 ASSAY OF IGE: CPT | Performed by: INTERNAL MEDICINE

## 2025-01-30 PROCEDURE — 36415 COLL VENOUS BLD VENIPUNCTURE: CPT | Performed by: PATHOLOGY

## 2025-01-30 PROCEDURE — 84460 ALANINE AMINO (ALT) (SGPT): CPT | Performed by: PATHOLOGY

## 2025-01-31 LAB
ANCA AB PATTERN SER IF-IMP: NORMAL
C-ANCA TITR SER IF: NORMAL {TITER}
IGA SERPL-MCNC: 160 MG/DL (ref 84–499)
IGE SERPL-ACNC: 78 KU/L (ref 0–114)
IGG SERPL-MCNC: 1072 MG/DL (ref 610–1616)
IGM SERPL-MCNC: 43 MG/DL (ref 35–242)

## 2025-06-04 ENCOUNTER — VIRTUAL VISIT (OUTPATIENT)
Dept: RHEUMATOLOGY | Facility: CLINIC | Age: 45
End: 2025-06-04
Attending: INTERNAL MEDICINE
Payer: COMMERCIAL

## 2025-06-04 VITALS — HEIGHT: 68 IN | BODY MASS INDEX: 28.04 KG/M2 | WEIGHT: 185 LBS

## 2025-06-04 DIAGNOSIS — M30.1 EOSINOPHILIC GRANULOMATOSIS WITH POLYANGIITIS (EGPA) (H): Primary | ICD-10-CM

## 2025-06-04 DIAGNOSIS — Z51.81 MEDICATION MONITORING ENCOUNTER: ICD-10-CM

## 2025-06-04 DIAGNOSIS — D72.18 EOSINOPHILIC GRANULOMATOSIS WITH POLYANGIITIS (EGPA) (H): Primary | ICD-10-CM

## 2025-06-04 ASSESSMENT — PAIN SCALES - GENERAL: PAINLEVEL_OUTOF10: NO PAIN (0)

## 2025-06-04 NOTE — NURSING NOTE
Current patient location: [address] [place] [address]    Is the patient currently in the state of [place]? YES    Visit mode: VIDEO    If the visit is dropped, the patient can be reconnected by:VIDEO VISIT: Text to cell phone:   Telephone Information:   Mobile [phone]       Will anyone else be joining the visit? NO  (If patient encounters technical issues they should call [phone]    Are changes needed to the allergy or medication list? No    Are refills needed on medications prescribed by this physician? Discuss with provider    Rooming Documentation:  Questionnaire(s) completed    Reason for visit: RECHECK    Jennifer JENKINS

## 2025-06-04 NOTE — LETTER
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 Normal   Nitrite Urine      Negative Negative   Leukocyte Esterase Urine      Negative Negative   RBC Urine      <=2 /HPF 0   WBC Urine      <=5 /HPF 0   MPO Zenaida IgG Instrument Value      <3.5 U/mL 8.0 (H)   Myeloperoxidase Antibody IgG      Negative Positive (A)   Proteinase 3 Zenaida IgG Instrument Value      <2.0 U/mL <1.0   Proteinase 3 Antibody IgG      Negative Negative   Total Protein Urine mg/dL      mg/dL <6.0   Total Protein UR MG/MG CR          Creatinine Urine      mg/dL 20.3   IGG      610 - 1,616 mg/dL 906   IGA      84 - 499 mg/dL 160   IGM      35 - 242 mg/dL 32 (L)   Creatinine      0.67 - 1.17 mg/dL 1.06   GFR Estimate      >60 mL/min/1.73m2 89   CD19 B Cells      6 - 27 % 8   Absolute CD19      107 - 698 cells/uL 151   Neutrophil Cytoplasmic Antibody      <1:10 <1:10   Neutrophil Cytoplasmic Antibody Pattern       The ANCA IFA is <1:10.  No further testing will be performed.   AST      10 - 50 U/L 36   ALT      10 - 50 U/L 35   Albumin      3.5 - 5.2 g/dL 4.5   CRP Inflammation      <5.00 mg/L <3.00   Sed Rate      0 - 15 mm/hr 6     Component      Latest Ref Rng & Units 11/1/2022   WBC      4.0 - 11.0 10e3/uL 6.4   RBC Count      4.40 - 5.90 10e6/uL 4.44    Hemoglobin      13.3 - 17.7 g/dL 14.8   Hematocrit      40.0 - 53.0 % 42.7   MCV      78 - 100 fL 96   MCH      26.5 - 33.0 pg 33.3 (H)   MCHC      31.5 - 36.5 g/dL 34.7   RDW      10.0 - 15.0 % 12.5   Platelet Count      150 - 450 10e3/uL 247   % Neutrophils      % 62   % Lymphocytes      % 26   % Monocytes      % 9   % Eosinophils      % 2   % Basophils      % 1   % Immature Granulocytes      % 0   NRBCs per 100 WBC      <1 /100 0   Absolute Neutrophils      1.6 - 8.3 10e3/uL 4.0   Absolute Lymphocytes      0.8 - 5.3 10e3/uL 1.7   Absolute Monocytes      0.0 - 1.3 10e3/uL 0.6   Absolute Eosinophils      0.0 - 0.7 10e3/uL 0.1   Absolute Basophils      0.0 - 0.2 10e3/uL 0.1   Absolute Immature Granulocytes      <=0.4 10e3/uL 0.0   Absolute NRBCs      10e3/uL 0.0   Color Urine      Colorless, Straw, Light Yellow, Yellow Straw   Appearance Urine      Clear Clear   Glucose Urine      Negative mg/dL Negative   Bilirubin Urine      Negative Negative   Ketones Urine      Negative mg/dL Negative   Specific Gravity Urine      1.003 - 1.035 1.010   Blood Urine      Negative Negative   pH Urine      5.0 - 7.0 6.0   Protein Albumin Urine      Negative mg/dL Negative   Urobilinogen mg/dL      Normal, 2.0 mg/dL Normal   Nitrite Urine      Negative Negative   Leukocyte Esterase Urine      Negative Negative   RBC Urine      <=2 /HPF 0   WBC Urine      <=5 /HPF <1   MPO Zenaida IgG Instrument Value      <3.5 U/mL 1.5   Myeloperoxidase Antibody IgG      Negative Negative   Proteinase 3 Zenaida IgG Instrument Value      <2.0 U/mL <1.0   Proteinase 3 Antibody IgG      Negative Negative   Total Protein Urine mg/dL      mg/dL <6.0   Total Protein UR MG/MG CR          Creatinine Urine      mg/dL 36.7   IGG      610 - 1,616 mg/dL 948   IGA      84 - 499 mg/dL 154   IGM      35 - 242 mg/dL 27 (L)   Creatinine      0.67 - 1.17 mg/dL 0.97   GFR Estimate      >60 mL/min/1.73m2 >90   CD19 B Cells      6 - 27 % 13   Absolute CD19      107 - 698  cells/uL 240   Neutrophil Cytoplasmic Antibody      <1:10 <1:10   Neutrophil Cytoplasmic Antibody Pattern       The ANCA IFA is <1:10.  No further testing will be performed.   AST      10 - 50 U/L 44   ALT      10 - 50 U/L 37   Albumin      3.5 - 5.2 g/dL 4.6   CRP Inflammation      <5.00 mg/L <3.00   Sed Rate      0 - 15 mm/hr 6     Component      Latest Ref [facility] 6/6/2023  11:57 AM   WBC      4.0 - 11.0 10e3/uL 6.3    RBC Count      4.40 - 5.90 10e6/uL 4.22 (L)    Hemoglobin      13.3 - 17.7 g/dL 14.0    Hematocrit      40.0 - 53.0 % 40.6    MCV      78 - 100 fL 96    MCH      26.5 - 33.0 pg 33.2 (H)    MCHC      31.5 - 36.5 g/dL 34.5    RDW      10.0 - 15.0 % 12.3    Platelet Count      150 - 450 10e3/uL 325    % Neutrophils      % 58    % Lymphocytes      % 26    % Monocytes      % 8    % Eosinophils      % 6    % Basophils      % 2    % Immature Granulocytes      % 0    NRBCs per 100 WBC      <1 /100 0    Absolute Neutrophils      1.6 - 8.3 10e3/uL 3.7    Absolute Lymphocytes      0.8 - 5.3 10e3/uL 1.7    Absolute Monocytes      0.0 - 1.3 10e3/uL 0.5    Absolute Eosinophils      0.0 - 0.7 10e3/uL 0.4    Absolute Basophils      0.0 - 0.2 10e3/uL 0.1    Absolute Immature Granulocytes      <=0.4 10e3/uL 0.0    Absolute NRBCs      10e3/uL 0.0    Color Urine      Colorless, Straw, Light Yellow, Yellow     Appearance Urine      Clear     Glucose Urine      Negative mg/dL    Bilirubin Urine      Negative     Ketones Urine      Negative mg/dL    Specific Gravity Urine      1.003 - 1.035     Blood Urine      Negative     pH Urine      5.0 - 7.0     Protein Albumin Urine      Negative mg/dL    Urobilinogen mg/dL      Normal, 2.0 mg/dL    Nitrite Urine      Negative     Leukocyte Esterase Urine      Negative     RBC Urine      <=2 /HPF    WBC Urine      <=5 /HPF    MPO Zenaida IgG Instrument Value      <3.5 U/mL 12.0 (H)    Myeloperoxidase Antibody IgG      Negative  Positive !    Proteinase 3 Zenaida IgG Instrument Value      <2.0  U/mL <1.0    Proteinase 3 Antibody IgG      Negative  Negative    Total Protein Urine mg/dL      1.0 - 14.0 mg/dL    Total Protein UR MG/MG CR    Creatinine Urine      mg/dL    CD19 B Cells      6 - 27 % 11    Absolute CD19      107 - 698 cells/uL 182    IGG      610 - 1,616 mg/dL 1,204    IGA      84 - 499 mg/dL 183    IGM      35 - 242 mg/dL 49    Creatinine      0.67 - 1.17 mg/dL 0.89    GFR Estimate      >60 mL/min/1.73m2 >90    Neutrophil Cytoplasmic Antibody      <1:10  1:10 (H)    Neutrophil Cytoplasmic Antibody Pattern Cytoplasmic ANCA (c-ANCA) staining pattern observed and confirmed on formalin-fixed neutrophils.    AST      10 - 50 U/L 41    ALT      10 - 50 U/L 47    Albumin      3.5 - 5.2 g/dL 4.3    CRP Inflammation      <5.00 mg/L 4.24    Sed Rate      0 - 15 mm/hr 18 (H)      Component      Latest Ref [facility] 6/6/2023  12:00 PM   WBC      4.0 - 11.0 10e3/uL    RBC Count      4.40 - 5.90 10e6/uL    Hemoglobin      13.3 - 17.7 g/dL    Hematocrit      40.0 - 53.0 %    MCV      78 - 100 fL    MCH      26.5 - 33.0 pg    MCHC      31.5 - 36.5 g/dL    RDW      10.0 - 15.0 %    Platelet Count      150 - 450 10e3/uL    % Neutrophils      %    % Lymphocytes      %    % Monocytes      %    % Eosinophils      %    % Basophils      %    % Immature Granulocytes      %    NRBCs per 100 WBC      <1 /100    Absolute Neutrophils      1.6 - 8.3 10e3/uL    Absolute Lymphocytes      0.8 - 5.3 10e3/uL    Absolute Monocytes      0.0 - 1.3 10e3/uL    Absolute Eosinophils      0.0 - 0.7 10e3/uL    Absolute Basophils      0.0 - 0.2 10e3/uL    Absolute Immature Granulocytes      <=0.4 10e3/uL    Absolute NRBCs      10e3/uL    Color Urine      Colorless, Straw, Light Yellow, Yellow  Straw    Appearance Urine      Clear  Clear    Glucose Urine      Negative mg/dL Negative    Bilirubin Urine      Negative  Negative    Ketones Urine      Negative mg/dL Negative    Specific Gravity Urine      1.003 - 1.035  1.009    Blood Urine       Negative  Negative    pH Urine      5.0 - 7.0  5.5    Protein Albumin Urine      Negative mg/dL Negative    Urobilinogen mg/dL      Normal, 2.0 mg/dL Normal    Nitrite Urine      Negative  Negative    Leukocyte Esterase Urine      Negative  Negative    RBC Urine      <=2 /HPF <1    WBC Urine      <=5 /HPF <1    MPO Zenaida IgG Instrument Value      <3.5 U/mL    Myeloperoxidase Antibody IgG      Negative     Proteinase 3 Zenaida IgG Instrument Value      <2.0 U/mL    Proteinase 3 Antibody IgG      Negative     Total Protein Urine mg/dL      1.0 - 14.0 mg/dL <6.0    Total Protein UR MG/MG CR --    Creatinine Urine      mg/dL 27.9    CD19 B Cells      6 - 27 %    Absolute CD19      107 - 698 cells/uL    IGG      610 - 1,616 mg/dL    IGA      84 - 499 mg/dL    IGM      35 - 242 mg/dL    Creatinine      0.67 - 1.17 mg/dL    GFR Estimate      >60 mL/min/1.73m2    Neutrophil Cytoplasmic Antibody      <1:10     Neutrophil Cytoplasmic Antibody Pattern    AST      10 - 50 U/L    ALT      10 - 50 U/L    Albumin      3.5 - 5.2 g/dL    CRP Inflammation      <5.00 mg/L    Sed Rate      0 - 15 mm/hr       Legend:  (L) Low  (H) High  ! Abnormal  Component      Latest Ref Rng 10/11/2023  11:32 AM   MPO Zenaida IgG Instrument Value      <3.5 U/mL 12.0 (H)    Myeloperoxidase Antibody IgG      Negative  Positive !    Proteinase 3 Zenaida IgG Instrument Value      <2.0 U/mL <1.0    Proteinase 3 Antibody IgG      Negative  Negative    Neutrophil Cytoplasmic Antibody      <1:10  <1:10    Neutrophil Cytoplasmic Antibody Pattern The ANCA IFA is <1:10. No further testing will be performed.       Legend:  (H) High  ! Abnormal      Again, thank you for allowing me to participate in the care of your patient.      Sincerely,    Isabella Manley MD

## 2025-06-04 NOTE — PROGRESS NOTES
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 Value      <2.0 U/mL <1.0   Proteinase 3 Antibody IgG      Negative Negative   Total Protein Urine mg/dL      mg/dL <6.0   Total Protein UR MG/MG CR          Creatinine Urine      mg/dL 20.3   IGG      610 - 1,616 mg/dL 906   IGA      84 - 499 mg/dL 160   IGM      35 - 242 mg/dL 32 (L)   Creatinine      0.67 - 1.17 mg/dL 1.06   GFR Estimate      >60 mL/min/1.73m2 89   CD19 B Cells      6 - 27 % 8   Absolute CD19      107 - 698 cells/uL 151   Neutrophil Cytoplasmic Antibody      <1:10 <1:10   Neutrophil Cytoplasmic Antibody Pattern       The ANCA IFA is <1:10.  No further testing will be performed.   AST      10 - 50 U/L 36   ALT      10 - 50 U/L 35   Albumin      3.5 - 5.2 g/dL 4.5   CRP Inflammation      <5.00 mg/L <3.00   Sed Rate      0 - 15 mm/hr 6     Component      Latest Ref Rng & Units 11/1/2022   WBC      4.0 - 11.0 10e3/uL 6.4   RBC Count      4.40 - 5.90 10e6/uL 4.44   Hemoglobin      13.3 - 17.7 g/dL 14.8   Hematocrit      40.0 - 53.0 % 42.7   MCV      78 - 100 fL 96   MCH      26.5 - 33.0 pg 33.3 (H)   MCHC      31.5 - 36.5 g/dL 34.7   RDW      10.0 - 15.0 % 12.5   Platelet Count      150 - 450 10e3/uL 247   % Neutrophils      % 62   % Lymphocytes      % 26   % Monocytes      % 9   % Eosinophils      % 2   % Basophils      % 1   % Immature  Granulocytes      % 0   NRBCs per 100 WBC      <1 /100 0   Absolute Neutrophils      1.6 - 8.3 10e3/uL 4.0   Absolute Lymphocytes      0.8 - 5.3 10e3/uL 1.7   Absolute Monocytes      0.0 - 1.3 10e3/uL 0.6   Absolute Eosinophils      0.0 - 0.7 10e3/uL 0.1   Absolute Basophils      0.0 - 0.2 10e3/uL 0.1   Absolute Immature Granulocytes      <=0.4 10e3/uL 0.0   Absolute NRBCs      10e3/uL 0.0   Color Urine      Colorless, Straw, Light Yellow, Yellow Straw   Appearance Urine      Clear Clear   Glucose Urine      Negative mg/dL Negative   Bilirubin Urine      Negative Negative   Ketones Urine      Negative mg/dL Negative   Specific Gravity Urine      1.003 - 1.035 1.010   Blood Urine      Negative Negative   pH Urine      5.0 - 7.0 6.0   Protein Albumin Urine      Negative mg/dL Negative   Urobilinogen mg/dL      Normal, 2.0 mg/dL Normal   Nitrite Urine      Negative Negative   Leukocyte Esterase Urine      Negative Negative   RBC Urine      <=2 /HPF 0   WBC Urine      <=5 /HPF <1   MPO [facility] IgG Instrument Value      <3.5 U/mL 1.5   Myeloperoxidase Antibody IgG      Negative Negative   Proteinase 3 [facility] IgG Instrument Value      <2.0 U/mL <1.0   Proteinase 3 Antibody IgG      Negative Negative   Total Protein Urine mg/dL      mg/dL <6.0   Total Protein UR MG/MG CR          Creatinine Urine      mg/dL 36.7   IGG      610 - 1,616 mg/dL 948   IGA      84 - 499 mg/dL 154   IGM      35 - 242 mg/dL 27 (L)   Creatinine      0.67 - 1.17 mg/dL 0.97   GFR Estimate      >60 mL/min/1.73m2 >90   CD19 B Cells      6 - 27 % 13   Absolute CD19      107 - 698 cells/uL 240   Neutrophil Cytoplasmic Antibody      <1:10 <1:10   Neutrophil Cytoplasmic Antibody Pattern       The ANCA IFA is <1:10.  No further testing will be performed.   AST      10 - 50 U/L 44   ALT      10 - 50 U/L 37   Albumin      3.5 - 5.2 g/dL 4.6   CRP Inflammation      <5.00 mg/L <3.00   Sed Rate      0 - 15 mm/hr 6     Component      Latest Ref [facility] 6/6/2023  11:57 AM    WBC      4.0 - 11.0 10e3/uL 6.3    RBC Count      4.40 - 5.90 10e6/uL 4.22 (L)    Hemoglobin      13.3 - 17.7 g/dL 14.0    Hematocrit      40.0 - 53.0 % 40.6    MCV      78 - 100 fL 96    MCH      26.5 - 33.0 pg 33.2 (H)    MCHC      31.5 - 36.5 g/dL 34.5    RDW      10.0 - 15.0 % 12.3    Platelet Count      150 - 450 10e3/uL 325    % Neutrophils      % 58    % Lymphocytes      % 26    % Monocytes      % 8    % Eosinophils      % 6    % Basophils      % 2    % Immature Granulocytes      % 0    NRBCs per 100 WBC      <1 /100 0    Absolute Neutrophils      1.6 - 8.3 10e3/uL 3.7    Absolute Lymphocytes      0.8 - 5.3 10e3/uL 1.7    Absolute Monocytes      0.0 - 1.3 10e3/uL 0.5    Absolute Eosinophils      0.0 - 0.7 10e3/uL 0.4    Absolute Basophils      0.0 - 0.2 10e3/uL 0.1    Absolute Immature Granulocytes      <=0.4 10e3/uL 0.0    Absolute NRBCs      10e3/uL 0.0    Color Urine      Colorless, Straw, Light Yellow, Yellow     Appearance Urine      Clear     Glucose Urine      Negative mg/dL    Bilirubin Urine      Negative     Ketones Urine      Negative mg/dL    Specific Gravity Urine      1.003 - 1.035     Blood Urine      Negative     pH Urine      5.0 - 7.0     Protein Albumin Urine      Negative mg/dL    Urobilinogen mg/dL      Normal, 2.0 mg/dL    Nitrite Urine      Negative     Leukocyte Esterase Urine      Negative     RBC Urine      <=2 /HPF    WBC Urine      <=5 /HPF    MPO [facility] IgG Instrument Value      <3.5 U/mL 12.0 (H)    Myeloperoxidase Antibody IgG      Negative  Positive !    Proteinase 3 [facility] IgG Instrument Value      <2.0 U/mL <1.0    Proteinase 3 Antibody IgG      Negative  Negative    Total Protein Urine mg/dL      1.0 - 14.0 mg/dL    Total Protein UR MG/MG CR    Creatinine Urine      mg/dL    CD19 B Cells      6 - 27 % 11    Absolute CD19      107 - 698 cells/uL 182    IGG      610 - 1,616 mg/dL 1,204    IGA      84 - 499 mg/dL 183    IGM      35 - 242 mg/dL 49    Creatinine      0.67 - 1.17  mg/dL 0.89    GFR Estimate      >60 mL/min/1.73m2 >90    Neutrophil Cytoplasmic Antibody      <1:10  1:10 (H)    Neutrophil Cytoplasmic Antibody Pattern Cytoplasmic ANCA (c-ANCA) staining pattern observed and confirmed on formalin-fixed neutrophils.    AST      10 - 50 U/L 41    ALT      10 - 50 U/L 47    Albumin      3.5 - 5.2 g/dL 4.3    CRP Inflammation      <5.00 mg/L 4.24    Sed Rate      0 - 15 mm/hr 18 (H)      Component      Latest Ref [facility] 6/6/2023  12:00 PM   WBC      4.0 - 11.0 10e3/uL    RBC Count      4.40 - 5.90 10e6/uL    Hemoglobin      13.3 - 17.7 g/dL    Hematocrit      40.0 - 53.0 %    MCV      78 - 100 fL    MCH      26.5 - 33.0 pg    MCHC      31.5 - 36.5 g/dL    RDW      10.0 - 15.0 %    Platelet Count      150 - 450 10e3/uL    % Neutrophils      %    % Lymphocytes      %    % Monocytes      %    % Eosinophils      %    % Basophils      %    % Immature Granulocytes      %    NRBCs per 100 WBC      <1 /100    Absolute Neutrophils      1.6 - 8.3 10e3/uL    Absolute Lymphocytes      0.8 - 5.3 10e3/uL    Absolute Monocytes      0.0 - 1.3 10e3/uL    Absolute Eosinophils      0.0 - 0.7 10e3/uL    Absolute Basophils      0.0 - 0.2 10e3/uL    Absolute Immature Granulocytes      <=0.4 10e3/uL    Absolute NRBCs      10e3/uL    Color Urine      Colorless, Straw, Light Yellow, Yellow  Straw    Appearance Urine      Clear  Clear    Glucose Urine      Negative mg/dL Negative    Bilirubin Urine      Negative  Negative    Ketones Urine      Negative mg/dL Negative    Specific Gravity Urine      1.003 - 1.035  1.009    Blood Urine      Negative  Negative    pH Urine      5.0 - 7.0  5.5    Protein Albumin Urine      Negative mg/dL Negative    Urobilinogen mg/dL      Normal, 2.0 mg/dL Normal    Nitrite Urine      Negative  Negative    Leukocyte Esterase Urine      Negative  Negative    RBC Urine      <=2 /HPF <1    WBC Urine      <=5 /HPF <1    MPO [facility] IgG Instrument Value      <3.5 U/mL    Myeloperoxidase  Antibody IgG      Negative     Proteinase 3 [facility] IgG Instrument Value      <2.0 U/mL    Proteinase 3 Antibody IgG      Negative     Total Protein Urine mg/dL      1.0 - 14.0 mg/dL <6.0    Total Protein UR MG/MG CR --    Creatinine Urine      mg/dL 27.9    CD19 B Cells      6 - 27 %    Absolute CD19      107 - 698 cells/uL    IGG      610 - 1,616 mg/dL    IGA      84 - 499 mg/dL    IGM      35 - 242 mg/dL    Creatinine      0.67 - 1.17 mg/dL    GFR Estimate      >60 mL/min/1.73m2    Neutrophil Cytoplasmic Antibody      <1:10     Neutrophil Cytoplasmic Antibody Pattern    AST      10 - 50 U/L    ALT      10 - 50 U/L    Albumin      3.5 - 5.2 g/dL    CRP Inflammation      <5.00 mg/L    Sed Rate      0 - 15 mm/hr       Legend:  (L) Low  (H) High  ! Abnormal  Component      Latest Ref Rng 10/11/2023  11:32 AM   MPO [facility] IgG Instrument Value      <3.5 U/mL 12.0 (H)    Myeloperoxidase Antibody IgG      Negative  Positive !    Proteinase 3 [facility] IgG Instrument Value      <2.0 U/mL <1.0    Proteinase 3 Antibody IgG      Negative  Negative    Neutrophil Cytoplasmic Antibody      <1:10  <1:10    Neutrophil Cytoplasmic Antibody Pattern The ANCA IFA is <1:10. No further testing will be performed.       Legend:  (H) High  ! Abnormal